# Patient Record
Sex: FEMALE | Race: OTHER | URBAN - METROPOLITAN AREA
[De-identification: names, ages, dates, MRNs, and addresses within clinical notes are randomized per-mention and may not be internally consistent; named-entity substitution may affect disease eponyms.]

---

## 2022-08-08 ENCOUNTER — HOSPITAL ENCOUNTER (OUTPATIENT)
Age: 32
Discharge: HOME | End: 2022-08-08
Attending: OBSTETRICS & GYNECOLOGY | Admitting: OBSTETRICS & GYNECOLOGY
Payer: MEDICAID

## 2022-08-08 VITALS
BODY MASS INDEX: 35.33 KG/M2 | HEIGHT: 62 IN | RESPIRATION RATE: 18 BRPM | HEART RATE: 83 BPM | TEMPERATURE: 98 F | WEIGHT: 192 LBS | SYSTOLIC BLOOD PRESSURE: 107 MMHG | DIASTOLIC BLOOD PRESSURE: 66 MMHG

## 2022-08-08 PROBLEM — E86.0 DEHYDRATION DURING PREGNANCY: Status: ACTIVE | Noted: 2022-08-08

## 2022-08-08 PROBLEM — O99.280 DEHYDRATION DURING PREGNANCY: Status: ACTIVE | Noted: 2022-08-08

## 2022-08-08 LAB
AMPHETAMINE SCREEN, URINE: NORMAL
BACTERIA: NEGATIVE /HPF
BARBITURATE SCREEN URINE: NORMAL
BENZODIAZEPINE SCREEN, URINE: NORMAL
BILIRUBIN URINE: NEGATIVE
BLOOD, URINE: NEGATIVE
CANNABINOID SCREEN URINE: NORMAL
CLARITY: CLEAR
CLUE CELLS: NORMAL
COCAINE METABOLITE SCREEN URINE: NORMAL
COLOR: YELLOW
EPITHELIAL CELLS, UA: ABNORMAL /HPF (ref 0–5)
FENTANYL SCREEN, URINE: NORMAL
GLUCOSE URINE: NEGATIVE MG/DL
HYALINE CASTS: ABNORMAL /HPF (ref 0–5)
KETONES, URINE: 15 MG/DL
LEUKOCYTE ESTERASE, URINE: ABNORMAL
Lab: NORMAL
METHADONE SCREEN, URINE: NORMAL
NITRITE, URINE: NEGATIVE
OPIATE SCREEN URINE: NORMAL
OXYCODONE URINE: NORMAL
PH UA: 7 (ref 5–9)
PHENCYCLIDINE SCREEN URINE: NORMAL
PROPOXYPHENE SCREEN: NORMAL
PROTEIN UA: NEGATIVE MG/DL
RBC UA: ABNORMAL /HPF (ref 0–5)
SPECIFIC GRAVITY UA: 1 (ref 1–1.03)
TRICHOMONAS PREP: NORMAL
TRICHOMONAS VAGINALIS SCREEN: NEGATIVE
URINE REFLEX TO CULTURE: ABNORMAL
UROBILINOGEN, URINE: 0.2 E.U./DL
WBC UA: ABNORMAL /HPF (ref 0–5)
YEAST WET PREP: NORMAL

## 2022-08-08 PROCEDURE — 81001 URINALYSIS AUTO W/SCOPE: CPT

## 2022-08-08 PROCEDURE — 99213 OFFICE O/P EST LOW 20 MIN: CPT | Performed by: OBSTETRICS & GYNECOLOGY

## 2022-08-08 PROCEDURE — 80307 DRUG TEST PRSMV CHEM ANLYZR: CPT

## 2022-08-08 PROCEDURE — 87808 TRICHOMONAS ASSAY W/OPTIC: CPT

## 2022-08-08 PROCEDURE — 59025 FETAL NON-STRESS TEST: CPT

## 2022-08-08 PROCEDURE — 99284 EMERGENCY DEPT VISIT MOD MDM: CPT

## 2022-08-08 PROCEDURE — 87210 SMEAR WET MOUNT SALINE/INK: CPT

## 2022-08-08 RX ORDER — NALBUPHINE HCL 10 MG/ML
10 AMPUL (ML) INJECTION
Status: DISCONTINUED | OUTPATIENT
Start: 2022-08-08 | End: 2022-08-08 | Stop reason: HOSPADM

## 2022-08-08 RX ORDER — ALPRAZOLAM 0.25 MG/1
0.25 TABLET ORAL DAILY PRN
COMMUNITY
Start: 2022-06-09 | End: 2022-09-07

## 2022-08-08 RX ORDER — SERTRALINE HYDROCHLORIDE 100 MG/1
150 TABLET, FILM COATED ORAL DAILY
COMMUNITY
Start: 2022-08-04

## 2022-08-08 RX ORDER — SODIUM CHLORIDE, SODIUM LACTATE, POTASSIUM CHLORIDE, AND CALCIUM CHLORIDE .6; .31; .03; .02 G/100ML; G/100ML; G/100ML; G/100ML
500 INJECTION, SOLUTION INTRAVENOUS ONCE
Status: DISCONTINUED | OUTPATIENT
Start: 2022-08-08 | End: 2022-08-08 | Stop reason: HOSPADM

## 2022-08-08 NOTE — PROGRESS NOTES
Pt states she feels better still, but is still able to feel ctx and pressure, ivf infused, will monitor and notify dr Paulie Pack.

## 2022-08-08 NOTE — PROGRESS NOTES
Pt here via squad from work, lower abdominal pain and tightening since about 1045. No bleeding, no leaking, no intercourse in past 24 hours. Failed 1 hour glucose, pt sees [facility] CNM, 1 ftvd 11 years ago.

## 2022-08-08 NOTE — PROGRESS NOTES
Dr Delarosa Postal aware of pt status, ivf infused. States pt can be discharge, to rest this afternoon, will call her tomorrow with results of wet prep.  If pt ctx worsen, pt to call her OB or return to hospital.

## 2022-08-08 NOTE — ED TRIAGE NOTES
Prep, Genital [[id number]]    Collected: 08/08/22 1253    Updated: 08/08/22 1315    Specimen Source: Vaginal    Microscopic Urinalysis [[id number]] (Abnormal)    Collected: 08/08/22 1204    Updated: 08/08/22 1235     Bacteria, UA Negative /HPF    Hyaline Casts, UA 1-3 /HPF    WBC, UA 6-9 Abnormal  /HPF    RBC, UA 0-2 /HPF    Epithelial Cells, UA 0-2 /HPF   Urine Drug Screen [[id number]]    Collected: 08/08/22 1204    Updated: 08/08/22 1233    Specimen Source: Urine, clean catch     Amphetamine Screen, Urine Neg    Barbiturate Screen, Ur Neg    Benzodiazepine Screen, Urine Neg    Cannabinoid Scrn, Ur Neg    Cocaine Metabolite Screen, Urine Neg    Opiate Scrn, Ur Neg    PCP Screen, Urine Neg    Methadone Screen, Urine Neg    Propoxyphene Scrn, Ur Neg    Oxycodone Urine Neg    FENTANYL SCREEN, URINE Neg    Drug Screen Comment: see below    Comment: This method is a screening test to detect only these drug   classes as part of a medical workup. Confirmatory testing   by another method should be ordered if clinically indicated. Urinalysis with Reflex to Culture [[id number]] (Abnormal)    Collected: 08/08/22 1204    Updated: 08/08/22 1233    Specimen Source: Urine, clean catch     Color, UA Yellow    Clarity, UA Clear    Glucose, Ur Negative mg/dL    Bilirubin Urine Negative    Ketones, Urine 15 Abnormal  mg/dL    Specific Gravity, UA 1.005    Blood, Urine Negative    pH, UA 7.0    Protein, UA Negative mg/dL    Urobilinogen, Urine 0.2 E.U./dL    Nitrite, Urine Negative    Leukocyte Esterase, Urine MODERATE Abnormal     Urine Reflex to Culture Not Indicated       ASSESSMENT & PLAN:      Assessment:   IUP @ 31.4 weeks  Dehydration affecting pregnancy  3. PTC affecting pregnancy    Plan:    NST reactive-reassurance given. IV fluid bolus given   IM Nubain given for PTC. Contractions slowed down with Nubain and IVF. Wet prep pending.  Will discharge home with pelvic rest, fetal kick counts and follow up with private OBGYN on 8/17/2022. Patient will be made aware of wet prep results once they are in .

## 2022-08-08 NOTE — DISCHARGE INSTRUCTIONS
Rest today, drink plenty of fluids, call OB provider for f/u appt, return to hospital for worsening pain.

## 2022-08-19 ENCOUNTER — HOSPITAL ENCOUNTER (OUTPATIENT)
Age: 32
Discharge: HOME | End: 2022-08-19
Attending: OBSTETRICS & GYNECOLOGY | Admitting: OBSTETRICS & GYNECOLOGY
Payer: MEDICAID

## 2022-08-19 PROCEDURE — 59025 FETAL NON-STRESS TEST: CPT | Performed by: OBSTETRICS & GYNECOLOGY

## 2022-08-19 PROCEDURE — 59025 FETAL NON-STRESS TEST: CPT

## 2022-08-19 NOTE — PROCEDURES
Department of Obstetrics and Gynecology  Labor and Delivery  Monik Schaffer MD: [facility] Triage Note      SUBJECTIVE:  Patient is a 35 yo  Female @ 33.1 weeks, [facility] 10/6/2022 who presents with concerns of DFM. She has high risk pregnancy, being seen at [facility]. She reports recent discharge from [facility] 2022 for PTC. She is s/p BMZ x 2. She has history of gastric bypass as well as A1GDM. She reports going home around 6 pm last night and that all she has done is sleep. She reports drinking ~ 3 bottles of water since discharge. She called office and [place] who recommended patient be seen for evaluation. She denies any recent coitus. She reports occasional contraction. OBJECTIVE    ROS:  Gen: Negative  CV:Negative  Lungs: Negative  Abdomen: Gravid, DFM  Pelvis: Negative  Rest of systems reviewed and found to be negative. Vitals:  LMP 2021 Recent Vitals     Most Recent Value 2022   1203 2022   1202 2022   1159   BP: 107/66  as of 2022 107/66 95/66 --   Temp: 98 °F (36.7 °C)  as of 2022 -- -- 98 °F (36.7 °C)   Heart Rate: 83  as of 2022 83 80 --   Height: 5' 2\" (157.5 cm)  as of 2022 -- -- 5' 2\" (157.5 cm)   Weight: 192 lb (87.1 kg)  as of 2022 -- -- 192 lb (87.1 kg)   Body Mass Index: 35.12 kg/m² Abnormal    5' 2\" (157.5 cm)  as of 2022   192 lb (87.1 kg)  as of 2022      Resp: 18  as of 2022 -- -- 18   SpO2: Not taken -- --        PE:   Gen: AxO x 3, Anxious  CV: RRR  Lungs: CTAB  Abdomen: Soft, gravid, +FM      Fetal heart rate:  Category I       Baseline Heart Rate: 140s R       Accelerations:  present       Decelerations:  none       Variability:  moderate    Contraction frequency: Occ         DATA:      ASSESSMENT & PLAN:      Assessment:    IUP @ 33.1 weeks  DFM-resolved-patient reports feeling movement    Plan:    NST reactive   Reassurance given.  Will discharge home with instructions, fetal kick counts and to follow up with private OBGYN with next scheduled appointment. PTL precautions given.

## 2022-08-19 NOTE — PROGRESS NOTES
Pt arrived to triage with complaints of decreased fetal movement. Pt  to room 313. Pt states she was d/c from [facility] yesterday and had not felt the baby move since then. Had betamethasone x2. Last dose 8/18/2022. Pt diet controlled gestational diabetic.

## 2022-09-12 ENCOUNTER — HOSPITAL ENCOUNTER (OUTPATIENT)
Age: 32
Discharge: HOME | DRG: 560 | End: 2022-09-12
Attending: OBSTETRICS & GYNECOLOGY | Admitting: OBSTETRICS & GYNECOLOGY
Payer: MEDICAID

## 2022-09-12 VITALS
SYSTOLIC BLOOD PRESSURE: 96 MMHG | HEART RATE: 85 BPM | TEMPERATURE: 97.7 F | DIASTOLIC BLOOD PRESSURE: 58 MMHG | RESPIRATION RATE: 18 BRPM

## 2022-09-12 PROBLEM — N60.19 FIBROCYSTIC BREAST CHANGES: Status: ACTIVE | Noted: 2022-05-13

## 2022-09-12 PROBLEM — L30.9 ECZEMA: Status: ACTIVE | Noted: 2022-09-12

## 2022-09-12 PROBLEM — N84.1 CERVICAL POLYP: Status: ACTIVE | Noted: 2022-03-25

## 2022-09-12 PROBLEM — M54.9 BACKACHE: Status: ACTIVE | Noted: 2019-07-15

## 2022-09-12 PROBLEM — E66.9 OBESITY: Status: ACTIVE | Noted: 2022-09-12

## 2022-09-12 PROBLEM — Z37.9 NORMAL LABOR: Status: ACTIVE | Noted: 2022-09-12

## 2022-09-12 PROBLEM — K21.9 GASTROESOPHAGEAL REFLUX DISEASE: Status: ACTIVE | Noted: 2022-05-13

## 2022-09-12 PROBLEM — L29.9 ITCHING OF BOTH HANDS: Status: ACTIVE | Noted: 2022-06-20

## 2022-09-12 PROBLEM — O99.810 ABNORMAL GLUCOSE COMPLICATING PREGNANCY: Status: ACTIVE | Noted: 2022-08-31

## 2022-09-12 PROBLEM — O36.63X0 EXCESSIVE FETAL GROWTH AFFECTING MANAGEMENT OF PREGNANCY IN THIRD TRIMESTER: Status: ACTIVE | Noted: 2022-09-08

## 2022-09-12 PROBLEM — R00.2 PALPITATIONS: Status: ACTIVE | Noted: 2019-07-15

## 2022-09-12 PROBLEM — O47.03 THREATENED PRETERM LABOR, THIRD TRIMESTER: Status: ACTIVE | Noted: 2022-08-18

## 2022-09-12 PROBLEM — E53.8 COBALAMIN DEFICIENCY: Status: ACTIVE | Noted: 2022-05-13

## 2022-09-12 PROBLEM — O09.899 RUBELLA NON-IMMUNE STATUS, ANTEPARTUM: Status: ACTIVE | Noted: 2022-03-16

## 2022-09-12 PROBLEM — D17.1 LIPOMA OF TORSO: Status: ACTIVE | Noted: 2022-05-13

## 2022-09-12 PROBLEM — O20.9 VAGINAL BLEEDING BEFORE 22 WEEKS GESTATION: Status: ACTIVE | Noted: 2022-03-25

## 2022-09-12 PROBLEM — M17.10 ARTHRITIS OF KNEE: Status: ACTIVE | Noted: 2022-05-13

## 2022-09-12 PROBLEM — F41.9 ANXIETY: Status: ACTIVE | Noted: 2022-05-13

## 2022-09-12 PROBLEM — O47.03 PRETERM UTERINE CONTRACTIONS IN THIRD TRIMESTER, ANTEPARTUM: Status: ACTIVE | Noted: 2022-09-12

## 2022-09-12 PROBLEM — R06.09 DOE (DYSPNEA ON EXERTION): Status: ACTIVE | Noted: 2019-07-15

## 2022-09-12 PROBLEM — Z28.39 RUBELLA NON-IMMUNE STATUS, ANTEPARTUM: Status: ACTIVE | Noted: 2022-03-16

## 2022-09-12 PROBLEM — F41.8 ANXIETY WITH DEPRESSION: Status: ACTIVE | Noted: 2019-01-03

## 2022-09-12 PROBLEM — E55.9 VITAMIN D DEFICIENCY: Status: ACTIVE | Noted: 2022-05-13

## 2022-09-12 PROBLEM — J30.9 ALLERGIC RHINITIS: Status: ACTIVE | Noted: 2022-05-13

## 2022-09-12 PROBLEM — M41.9 SCOLIOSIS: Status: ACTIVE | Noted: 2022-05-13

## 2022-09-12 PROBLEM — K52.9 GASTROENTERITIS: Status: ACTIVE | Noted: 2022-05-13

## 2022-09-12 LAB
AMPHETAMINE SCREEN, URINE: NORMAL
BACTERIA: NEGATIVE /HPF
BARBITURATE SCREEN URINE: NORMAL
BENZODIAZEPINE SCREEN, URINE: NORMAL
BILIRUBIN URINE: NEGATIVE
BLOOD, URINE: NEGATIVE
CANNABINOID SCREEN URINE: NORMAL
CLARITY: CLEAR
COCAINE METABOLITE SCREEN URINE: NORMAL
COLOR: YELLOW
EPITHELIAL CELLS, UA: NORMAL /HPF (ref 0–5)
FENTANYL SCREEN, URINE: NORMAL
GLUCOSE URINE: NEGATIVE MG/DL
HYALINE CASTS: NORMAL /HPF (ref 0–5)
KETONES, URINE: ABNORMAL MG/DL
LEUKOCYTE ESTERASE, URINE: ABNORMAL
Lab: NORMAL
METHADONE SCREEN, URINE: NORMAL
NITRITE, URINE: NEGATIVE
OPIATE SCREEN URINE: NORMAL
OXYCODONE URINE: NORMAL
PH UA: 6 (ref 5–9)
PHENCYCLIDINE SCREEN URINE: NORMAL
PLACENTA ALPHA MICROGLOBULIN-1: NEGATIVE
PROPOXYPHENE SCREEN: NORMAL
PROTEIN UA: 30 MG/DL
RBC UA: NORMAL /HPF (ref 0–5)
SPECIFIC GRAVITY UA: 1.02 (ref 1–1.03)
URINE REFLEX TO CULTURE: ABNORMAL
UROBILINOGEN, URINE: 1 E.U./DL
WBC UA: NORMAL /HPF (ref 0–5)

## 2022-09-12 PROCEDURE — 1220000000 HC SEMI PRIVATE OB R&B

## 2022-09-12 PROCEDURE — 99999 PR OFFICE/OUTPT VISIT,PROCEDURE ONLY: CPT | Performed by: OBSTETRICS & GYNECOLOGY

## 2022-09-12 PROCEDURE — 80307 DRUG TEST PRSMV CHEM ANLYZR: CPT

## 2022-09-12 PROCEDURE — 99215 OFFICE O/P EST HI 40 MIN: CPT | Performed by: OBSTETRICS & GYNECOLOGY

## 2022-09-12 PROCEDURE — 99283 EMERGENCY DEPT VISIT LOW MDM: CPT

## 2022-09-12 PROCEDURE — 81001 URINALYSIS AUTO W/SCOPE: CPT

## 2022-09-12 PROCEDURE — 6360000002 HC RX W HCPCS

## 2022-09-12 PROCEDURE — 84112 EVAL AMNIOTIC FLUID PROTEIN: CPT

## 2022-09-12 RX ORDER — ACETAMINOPHEN 325 MG/1
650 TABLET ORAL EVERY 4 HOURS PRN
Status: DISCONTINUED | OUTPATIENT
Start: 2022-09-12 | End: 2022-09-12

## 2022-09-12 RX ORDER — SODIUM CHLORIDE 0.9 % (FLUSH) 0.9 %
5-40 SYRINGE (ML) INJECTION EVERY 12 HOURS SCHEDULED
Status: DISCONTINUED | OUTPATIENT
Start: 2022-09-12 | End: 2022-09-12

## 2022-09-12 RX ORDER — SODIUM CHLORIDE, SODIUM LACTATE, POTASSIUM CHLORIDE, CALCIUM CHLORIDE 600; 310; 30; 20 MG/100ML; MG/100ML; MG/100ML; MG/100ML
INJECTION, SOLUTION INTRAVENOUS CONTINUOUS
Status: DISCONTINUED | OUTPATIENT
Start: 2022-09-12 | End: 2022-09-12

## 2022-09-12 RX ORDER — GLUCOSAMINE HCL/CHONDROITIN SU 500-400 MG
CAPSULE ORAL
Status: ON HOLD | COMMUNITY
Start: 2022-07-26 | End: 2022-09-16 | Stop reason: HOSPADM

## 2022-09-12 RX ORDER — ACETAMINOPHEN 500 MG
500 TABLET ORAL EVERY 6 HOURS PRN
Status: ON HOLD | COMMUNITY
End: 2022-09-16 | Stop reason: HOSPADM

## 2022-09-12 RX ORDER — NALBUPHINE HCL 10 MG/ML
10 AMPUL (ML) INJECTION
Status: DISCONTINUED | OUTPATIENT
Start: 2022-09-12 | End: 2022-09-12

## 2022-09-12 RX ORDER — SODIUM CHLORIDE 0.9 % (FLUSH) 0.9 %
5-40 SYRINGE (ML) INJECTION PRN
Status: DISCONTINUED | OUTPATIENT
Start: 2022-09-12 | End: 2022-09-12

## 2022-09-12 RX ORDER — SODIUM CHLORIDE, SODIUM LACTATE, POTASSIUM CHLORIDE, AND CALCIUM CHLORIDE .6; .31; .03; .02 G/100ML; G/100ML; G/100ML; G/100ML
1000 INJECTION, SOLUTION INTRAVENOUS PRN
Status: DISCONTINUED | OUTPATIENT
Start: 2022-09-12 | End: 2022-09-12

## 2022-09-12 RX ORDER — NALBUPHINE HCL 10 MG/ML
AMPUL (ML) INJECTION
Status: COMPLETED
Start: 2022-09-12 | End: 2022-09-12

## 2022-09-12 RX ORDER — SODIUM CHLORIDE 9 MG/ML
25 INJECTION, SOLUTION INTRAVENOUS PRN
Status: DISCONTINUED | OUTPATIENT
Start: 2022-09-12 | End: 2022-09-12

## 2022-09-12 RX ORDER — ONDANSETRON 2 MG/ML
4 INJECTION INTRAMUSCULAR; INTRAVENOUS EVERY 6 HOURS PRN
Status: DISCONTINUED | OUTPATIENT
Start: 2022-09-12 | End: 2022-09-12

## 2022-09-12 RX ORDER — BLOOD-GLUCOSE METER
1 KIT MISCELLANEOUS 4 TIMES DAILY
COMMUNITY
Start: 2022-08-24

## 2022-09-12 RX ORDER — SODIUM CHLORIDE, SODIUM LACTATE, POTASSIUM CHLORIDE, AND CALCIUM CHLORIDE .6; .31; .03; .02 G/100ML; G/100ML; G/100ML; G/100ML
500 INJECTION, SOLUTION INTRAVENOUS PRN
Status: DISCONTINUED | OUTPATIENT
Start: 2022-09-12 | End: 2022-09-12

## 2022-09-12 RX ADMIN — NALBUPHINE HYDROCHLORIDE 10 MG: 10 INJECTION, SOLUTION INTRAMUSCULAR; INTRAVENOUS; SUBCUTANEOUS at 08:04

## 2022-09-12 RX ADMIN — Medication 10 MG: at 08:04

## 2022-09-12 ASSESSMENT — PAIN DESCRIPTION - LOCATION: LOCATION: ABDOMEN

## 2022-09-12 NOTE — FLOWSHEET NOTE
Called Dr. Cedillo Person to inform him of patients arrival, complaints of contractions, and contraction pattern. Made aware that patient is unable to urinate at this time and states to perform SVE.

## 2022-09-12 NOTE — PROGRESS NOTES
Obstetrical History and Physical        CHIEF COMPLAINT:  abdominal pain, contractions    HISTORY OF PRESENT ILLNESS:      The patient is a 34 y.o. female at 37w2d   OB History          2    Para   1    Term                AB        Living             SAB        IAB        Ectopic        Molar        Multiple        Live Births                Patient presents with a chief complaint as above and is being admitted for active phase labor  Denies DFM/VB/LOF/HA/EpigastricPain/Visual changes     Estimated Due Date: Estimated Date of Delivery: 10/6/22        PRENATAL CARE:  Pregnancy Complications:   None      PAST OB HISTORY:  OB History    Para Term  AB Living   2 1           SAB IAB Ectopic Molar Multiple Live Births                    # Outcome Date GA Lbr Sylvester/2nd Weight Sex Delivery Anes PTL Lv   2 Current            1 Para                   Past Medical History:    History reviewed. No pertinent past medical history.   Past Surgical History:        Procedure Laterality Date    ANKLE SURGERY  2018    GASTRIC BYPASS SURGERY  10/2018     Allergies:    Sulfa antibiotics  Social History:    Social History     Socioeconomic History    Marital status: Unknown     Spouse name: Not on file    Number of children: Not on file    Years of education: Not on file    Highest education level: Not on file   Occupational History    Not on file   Tobacco Use    Smoking status: Not on file    Smokeless tobacco: Not on file   Substance and Sexual Activity    Alcohol use: Not on file    Drug use: Not on file    Sexual activity: Not on file   Other Topics Concern    Not on file   Social History Narrative    Not on file     Social Determinants of Health     Financial Resource Strain: Not on file   Food Insecurity: Not on file   Transportation Needs: Not on file   Physical Activity: Not on file   Stress: Not on file   Social Connections: Not on file   Intimate Partner Violence: Not on file   Housing Stability: Not on file     Family History:   History reviewed. No pertinent family history. Medications Prior to Admission:  Medications Prior to Admission: Prenatal Vit-Fe Fumarate-FA (PRENATAL 1+1 PO), Take by mouth  acetaminophen (TYLENOL) 500 MG tablet, Take 500 mg by mouth every 6 hours as needed for Pain  sertraline (ZOLOFT) 100 MG tablet, Take 150 mg by mouth in the morning. REVIEW OF SYSTEMS:   Const:            Negative  HEENT:  Negative  Resp:   Negative  CVS:   Negative  GI:  Negative  :  Negative  MSK:    Negative  Breast: Negative  Skin:  Negative  Heme/Lymph:Negative  Endo:  Negative  Neuro:  Negative  Psych: Negative    PHYSICAL EXAM:  /78   Pulse 82   Temp 97.7 °F (36.5 °C) (Oral)   Resp 18   LMP 2021     General appearance:  awake, alert, cooperative, no apparent distress, and appears stated age  Neurologic:  Awake, alert, oriented to name, place and time. Lungs:  No increased work of breathing, good air exchange  Abdomen:  Soft, non tender, gravid, consistent with her gestational age  Sterile Speculum Exam:    Membranes:  Intact   HSV Lesions: Absent   Cervix: 3/60/-2  Contraction frequency: 1-2 minutes      Labs:   Blood Type/Rh:  No results found for: LABABO, RH  Group B Strep:  No results found for: GRPBPCR      Fetus:   EFW: 7 lbs by exam   Presentation: vertex by exam    Carlisle Score: 9   0 1 2 3   Position Posterior Mid Anterior -   Consistency Firm Medium Soft -   Effacement 0-30% 40-50% 60-70% 80% or >   Dilation 0cm 1-2cm 3-4cm 5cm or >   Fetal Station -3 -2 -1, 0 +1, +2     LABOR DELIVERY ? ??     SCD's ONLY (labor through postpartum ambulation) SCD's PLUS   Prophylactic Anticoagulation   until discharge SCD's PLUS   Prophylactic Anticoagulation   for 6 weeks SCD's PLUS  Therapeutic Anticoagulation for 6 weeks   Vaginal Delivery   [] BMI ? 40 kg/m2           Delivery   All patients Vaginal Delivery   [] BMI ? 40 kg/m2       AND  [] Antepartum hospitalization ?  72 hours within the past month     Delivery   1 Major Risk Factor:  [] BMI ? 35 kg/m2   [] Low Risk Thrombophilia  [] PPH+RBCs, IR, or operation  [] Infection+Antibiotics  [] Antepartum hospitalization ? 72 hours within the past month   [] PMH: Sickle Cell, SLE, Cardiac Dz, Active IBD, Active Cancer, Nephrotic Syndrome    OR 2 Minor Risk Factors:  [] Multiple gestation  [] Age > 40  [] PPH ? 1,000cc  [] (+)FMH of VTE  [] Smoker  [] Preeclampsia [] BMI ? 40 kg/m2       AND  [] Low Risk Thrombophilia    OR     ANY OF THE FOLLOWING:  [] High Risk Thrombophilia without prior VTE  [] Low Risk Thrombophilia with (+)FMH of VTE  [] Any single prior VTE ANY OF THE FOLLOWING:  [] Already on LMWH/UFH  [] Multiple prior VTE  [] High Risk Thrombophilia with prior VTE     Low Risk Thrombophilia: FVL (heterozygous), Prothrombin (heterozygous), Protein C, Protein S  High Risk Thrombophilia: FVL (homozygous), Prothrombin (homozygous), FVL+Prothrombin (heterozygous), Antithrombin III, APLS       ASSESSMENT AND PLAN:    1. IUP at 36w4d  2. Active phase labor    Admission: Admit to L&D   FHR:  Category 1  Celestone: indicated and ordered  Pain control plan: desires epidural  Delivery Plan: expectant   GBS: GBS Unknown, Ampicillin for GBS prophylaxis  LARC: Denies  Intrapartum SCDs: Not Indicated  Postpartum VTE Prophylaxis: Not Indicated          Ignacio Peters MBA, Beaulah Hey  2022

## 2022-09-12 NOTE — ED TRIAGE NOTES
Department of Obstetrics and Gynecology  Labor and Delivery  Michael Alcaraz MD: [facility] Triage Note      SUBJECTIVE:  Patient is a 35 yo  female @ 36.4 weeks, [facility] 10/6/2022 who presents with complaints of contractions. She is a patient of Midwives @ [facility]. She has been seen at multiple hospitals for ongoing  contractions. She was just discharged from [facility] 9/10/2022 for same complaint. She presents today with worsening contractions over the last 18 hours. She was also concerned that  her water was broken. She reports calling her private OBGYN but received no calls back. OBJECTIVE    ROS:  Gen: Uncomfortable  CV: Negative  Lungs: Negative  Abdomen: Gravid, +FM  Pelvis: Pressure  Rest of systems reviewed and found to be negative.     Vitals:  BP (!) 96/58   Pulse 85   Temp 97.7 °F (36.5 °C) (Oral)   Resp 18   LMP 2021     PE:   Gen: AxO x 3, in Mild distress  Abdomen: Gravid, +FM  Cervix:             Dilation:  2-3         Effacement:  60         Station:  -3         Consistency:  Medium         Position: Posterior    Fetal Position:  Vertex    Membranes: Intact    Fetal heart rate:  Category I       Baseline Heart Rate: 140s R       Accelerations:  present       Decelerations: none       Variability:  moderate    Contraction frequency: Irregular, q 4-7 minutes, spacing out    DATA:  Time Vallie Sarbjit Orders]     Results     Component Value Units   Rupture of Fetal Membrane [[id number]]    Collected: 22    Updated: 22    Specimen Type: Vaginal     Placenta Alpha Microglobulin-1 Negative   Microscopic Urinalysis [[id number]]    Collected: 22    Updated: 22     Bacteria, UA Negative /HPF    Hyaline Casts, UA 1-3 /HPF    WBC, UA 3-5 /HPF    RBC, UA 0-2 /HPF    Epithelial Cells, UA 3-5 /HPF   Urinalysis with Reflex to Culture [[id number]] (Abnormal)    Collected: 22    Updated: 22    Specimen Source: Urine Color, UA Yellow    Clarity, UA Clear    Glucose, Ur Negative mg/dL    Bilirubin Urine Negative    Ketones, Urine TRACE Abnormal  mg/dL    Specific Gravity, UA 1.023    Blood, Urine Negative    pH, UA 6.0    Protein, UA 30 Abnormal  mg/dL    Urobilinogen, Urine 1.0 E.U./dL    Nitrite, Urine Negative    Leukocyte Esterase, Urine SMALL Abnormal     Urine Reflex to Culture Not Indicated   Urine Drug Screen [[id number]]    Collected: 22    Updated: 22    Specimen Source: Urine     Amphetamine Screen, Urine Neg    Barbiturate Screen, Ur Neg    Benzodiazepine Screen, Urine Neg    Cannabinoid Scrn, Ur Neg    Cocaine Metabolite Screen, Urine Neg    Opiate Scrn, Ur Neg    PCP Screen, Urine Neg    Methadone Screen, Urine Neg    Propoxyphene Scrn, Ur Neg    Oxycodone Urine Neg    FENTANYL SCREEN, URINE Neg    Drug Screen Comment: see below    Comment: This method is a screening test to detect only these drug   classes as part of a medical workup. Confirmatory testing   by another method should be ordered if clinically indicated. ASSESSMENT & PLAN:      Assessment:   IUP @ 36.4 weeks  Rupture of Membranes Ruled out   Contractions    Plan:    Reactive NST. Reassurance given. No cervical change from 2 hour observation. Patient reports contractions spacing out and she got relief from Nubain. Recommend increasing po liquid intake. Discharge home with PTL precautions, fetal kick counts and follow up with private provider this week. Spoke with triage nurse for midwife group and discussed patient's status, and need for follow up sooner rather than later.

## 2022-09-12 NOTE — FLOWSHEET NOTE
Patient states having contractions for about 18 hours. States shes been ab since 32 weeks, but these contractions are more intense. [facility] States being seen at clinic this past Saturday for low sugar and minimal decreased movement. Patient states she has gestational diabetes.

## 2022-09-12 NOTE — FLOWSHEET NOTE
Patient in room 314. Attempted to provide urine sample. States she does not have to urinate at this time.

## 2022-09-12 NOTE — FLOWSHEET NOTE
Dr Kareem Palmer at bedside. SVE performed. Cervix remains unchanged. Encouraged patient to make appointment with office today or tomorrow. Patient may be discharged home now.

## 2022-09-12 NOTE — FLOWSHEET NOTE
Dr Marlee Peña at bedside. SVE done, patient 2-3 cm dilated. Discussed with patient not being able to induce before 39 weeks. Dr Marlee Peña to offer nubain and recheck in about an hour.

## 2022-09-13 ENCOUNTER — ROUTINE PRENATAL (OUTPATIENT)
Dept: OBGYN CLINIC | Age: 32
End: 2022-09-13
Payer: MEDICAID

## 2022-09-13 VITALS
SYSTOLIC BLOOD PRESSURE: 104 MMHG | WEIGHT: 200 LBS | DIASTOLIC BLOOD PRESSURE: 64 MMHG | BODY MASS INDEX: 36.58 KG/M2 | HEART RATE: 96 BPM

## 2022-09-13 DIAGNOSIS — Z34.93 PRENATAL CARE, THIRD TRIMESTER: Primary | ICD-10-CM

## 2022-09-13 DIAGNOSIS — O24.410 DIET CONTROLLED GESTATIONAL DIABETES MELLITUS (GDM) IN THIRD TRIMESTER: ICD-10-CM

## 2022-09-13 DIAGNOSIS — Z34.93 PRENATAL CARE, THIRD TRIMESTER: ICD-10-CM

## 2022-09-13 DIAGNOSIS — Z34.83 SUPERVISION OF NORMAL INTRAUTERINE PREGNANCY IN MULTIGRAVIDA IN THIRD TRIMESTER: ICD-10-CM

## 2022-09-13 DIAGNOSIS — O36.63X9 LGA (LARGE FOR GESTATIONAL AGE) FETUS AFFECTING MANAGEMENT OF MOTHER, THIRD TRIMESTER, OTHER FETUS: ICD-10-CM

## 2022-09-13 PROCEDURE — 99213 OFFICE O/P EST LOW 20 MIN: CPT | Performed by: OBSTETRICS & GYNECOLOGY

## 2022-09-13 RX ORDER — FLASH GLUCOSE SENSOR
KIT MISCELLANEOUS
COMMUNITY
Start: 2022-07-14

## 2022-09-13 RX ORDER — HYDROXYZINE HYDROCHLORIDE 10 MG/1
TABLET, FILM COATED ORAL
Status: ON HOLD | COMMUNITY
Start: 2022-06-08 | End: 2022-09-16 | Stop reason: HOSPADM

## 2022-09-13 NOTE — PROGRESS NOTES
Transfer from [facility]. irregular contractions. Pressure and vaginal pain. GDM. Monitoring blood sugars. Saw endocrine yesterday.  US 35weeks done

## 2022-09-13 NOTE — PROGRESS NOTES
Patient's last menstrual period was 12/30/2021. Please reference prenatal and OB flow chart for further information  PT here today for routine prenatal care  Pt endorses fetal movement and denies loss of fluid, contractions or vaginal bleeding  Patient is here at 36 weeks and 5 days. She was receiving care elsewhere and transferred here. She was up in labor and delivery yesterday thought she was in labor. Her NST was reactive. She was 3 cm dilated yesterday. She is a gestational diabetic diet controlled sugars are excellent. Positive fetal movement. Kick counts twice daily are good. She is having infrequent contractions  ROS:  Pt denies headache, vision changes, right upper quadrant pain, dysuria, or nausea/vomiting,     PE:  /64   Pulse 96   Wt 200 lb (90.7 kg)   LMP 12/30/2021   BMI 36.58 kg/m²   Gen - Alert and oriented x 3  HEENT- NC/AT, CVS - RRR, Lungs - CTAB  Abd - FH 40  [id number]  Cervix 3 cm 50% effaced vertex at a -1 station      ASSESSMENT AND PLAN:   IUP at 36 weeks and 5 days  LGA fetus  Gestational diabetes diet-controlled plan patient to follow-up in 1 week we will do NSTs on a weekly basis.   Ultrasound in 2 weeks

## 2022-09-14 ENCOUNTER — ANESTHESIA (OUTPATIENT)
Dept: LABOR AND DELIVERY | Age: 32
DRG: 560 | End: 2022-09-14
Payer: MEDICAID

## 2022-09-14 ENCOUNTER — ANESTHESIA EVENT (OUTPATIENT)
Dept: LABOR AND DELIVERY | Age: 32
DRG: 560 | End: 2022-09-14
Payer: MEDICAID

## 2022-09-14 ENCOUNTER — HOSPITAL ENCOUNTER (INPATIENT)
Age: 32
LOS: 2 days | Discharge: HOME | DRG: 560 | End: 2022-09-16
Attending: OBSTETRICS & GYNECOLOGY | Admitting: OBSTETRICS & GYNECOLOGY
Payer: MEDICAID

## 2022-09-14 LAB
ABO/RH: NORMAL
ALBUMIN SERPL-MCNC: 3 G/DL (ref 3.5–4.6)
ALP BLD-CCNC: 204 U/L (ref 40–130)
ALT SERPL-CCNC: 7 U/L (ref 0–33)
AMPHETAMINE SCREEN, URINE: NORMAL
ANION GAP SERPL CALCULATED.3IONS-SCNC: 11 MEQ/L (ref 9–15)
ANTIBODY SCREEN: NORMAL
AST SERPL-CCNC: 24 U/L (ref 0–35)
BACTERIA: NEGATIVE /HPF
BARBITURATE SCREEN URINE: NORMAL
BASOPHILS ABSOLUTE: 0 K/UL (ref 0–0.2)
BASOPHILS RELATIVE PERCENT: 0.4 %
BENZODIAZEPINE SCREEN, URINE: NORMAL
BILIRUB SERPL-MCNC: <0.2 MG/DL (ref 0.2–0.7)
BILIRUBIN URINE: NEGATIVE
BLOOD, URINE: ABNORMAL
BUN BLDV-MCNC: 6 MG/DL (ref 6–20)
CALCIUM SERPL-MCNC: 8.7 MG/DL (ref 8.5–9.9)
CANNABINOID SCREEN URINE: NORMAL
CHLORIDE BLD-SCNC: 108 MEQ/L (ref 95–107)
CLARITY: ABNORMAL
CO2: 18 MEQ/L (ref 20–31)
COCAINE METABOLITE SCREEN URINE: NORMAL
COLOR: YELLOW
CREAT SERPL-MCNC: 0.48 MG/DL (ref 0.5–0.9)
CRYSTALS, UA: ABNORMAL /HPF
EOSINOPHILS ABSOLUTE: 0.1 K/UL (ref 0–0.7)
EOSINOPHILS RELATIVE PERCENT: 0.9 %
EPITHELIAL CELLS, UA: ABNORMAL /HPF (ref 0–5)
FENTANYL SCREEN, URINE: NORMAL
FINE CASTS, UA: ABNORMAL /LPF (ref 0–5)
GFR AFRICAN AMERICAN: >60
GFR NON-AFRICAN AMERICAN: >60
GLOBULIN: 2.7 G/DL (ref 2.3–3.5)
GLUCOSE BLD-MCNC: 98 MG/DL (ref 70–99)
GLUCOSE URINE: NEGATIVE MG/DL
HCT VFR BLD CALC: 34.2 % (ref 37–47)
HEMOGLOBIN: 11 G/DL (ref 12–16)
HEPATITIS B SURFACE ANTIGEN INTERPRETATION: NORMAL
KETONES, URINE: 15 MG/DL
LEUKOCYTE ESTERASE, URINE: ABNORMAL
LYMPHOCYTES ABSOLUTE: 1.9 K/UL (ref 1–4.8)
LYMPHOCYTES RELATIVE PERCENT: 19.5 %
Lab: NORMAL
MCH RBC QN AUTO: 26.2 PG (ref 27–31.3)
MCHC RBC AUTO-ENTMCNC: 32.2 % (ref 33–37)
MCV RBC AUTO: 81.4 FL (ref 82–100)
METHADONE SCREEN, URINE: NORMAL
MONOCYTES ABSOLUTE: 0.8 K/UL (ref 0.2–0.8)
MONOCYTES RELATIVE PERCENT: 7.9 %
NEUTROPHILS ABSOLUTE: 7.1 K/UL (ref 1.4–6.5)
NEUTROPHILS RELATIVE PERCENT: 71.3 %
NITRITE, URINE: NEGATIVE
OPIATE SCREEN URINE: NORMAL
OXYCODONE URINE: NORMAL
PDW BLD-RTO: 13.3 % (ref 11.5–14.5)
PH UA: 6.5 (ref 5–9)
PHENCYCLIDINE SCREEN URINE: NORMAL
PLACENTA ALPHA MICROGLOBULIN-1: POSITIVE
PLATELET # BLD: 142 K/UL (ref 130–400)
POTASSIUM SERPL-SCNC: 3.5 MEQ/L (ref 3.4–4.9)
PROPOXYPHENE SCREEN: NORMAL
PROTEIN UA: 30 MG/DL
RBC # BLD: 4.2 M/UL (ref 4.2–5.4)
RBC UA: ABNORMAL /HPF (ref 0–5)
RPR: NORMAL
SARS-COV-2, NAAT: NOT DETECTED
SODIUM BLD-SCNC: 137 MEQ/L (ref 135–144)
SPECIFIC GRAVITY UA: 1.02 (ref 1–1.03)
TOTAL PROTEIN: 5.7 G/DL (ref 6.3–8)
URINE REFLEX TO CULTURE: YES
UROBILINOGEN, URINE: 1 E.U./DL
WBC # BLD: 9.9 K/UL (ref 4.8–10.8)
WBC UA: ABNORMAL /HPF (ref 0–5)

## 2022-09-14 PROCEDURE — 59409 OBSTETRICAL CARE: CPT | Performed by: OBSTETRICS & GYNECOLOGY

## 2022-09-14 PROCEDURE — 7200000001 HC VAGINAL DELIVERY

## 2022-09-14 PROCEDURE — 3700000025 EPIDURAL BLOCK: Performed by: NURSE ANESTHETIST, CERTIFIED REGISTERED

## 2022-09-14 PROCEDURE — 2580000003 HC RX 258: Performed by: OBSTETRICS & GYNECOLOGY

## 2022-09-14 PROCEDURE — 86901 BLOOD TYPING SEROLOGIC RH(D): CPT

## 2022-09-14 PROCEDURE — 0KQM0ZZ REPAIR PERINEUM MUSCLE, OPEN APPROACH: ICD-10-PCS | Performed by: OBSTETRICS & GYNECOLOGY

## 2022-09-14 PROCEDURE — 59025 FETAL NON-STRESS TEST: CPT | Performed by: OBSTETRICS & GYNECOLOGY

## 2022-09-14 PROCEDURE — 84112 EVAL AMNIOTIC FLUID PROTEIN: CPT

## 2022-09-14 PROCEDURE — 1220000000 HC SEMI PRIVATE OB R&B

## 2022-09-14 PROCEDURE — 87635 SARS-COV-2 COVID-19 AMP PRB: CPT

## 2022-09-14 PROCEDURE — 6360000002 HC RX W HCPCS: Performed by: OBSTETRICS & GYNECOLOGY

## 2022-09-14 PROCEDURE — 88307 TISSUE EXAM BY PATHOLOGIST: CPT

## 2022-09-14 PROCEDURE — 81001 URINALYSIS AUTO W/SCOPE: CPT

## 2022-09-14 PROCEDURE — 80307 DRUG TEST PRSMV CHEM ANLYZR: CPT

## 2022-09-14 PROCEDURE — 6370000000 HC RX 637 (ALT 250 FOR IP): Performed by: OBSTETRICS & GYNECOLOGY

## 2022-09-14 PROCEDURE — 86592 SYPHILIS TEST NON-TREP QUAL: CPT

## 2022-09-14 PROCEDURE — 85025 COMPLETE CBC W/AUTO DIFF WBC: CPT

## 2022-09-14 PROCEDURE — 87086 URINE CULTURE/COLONY COUNT: CPT

## 2022-09-14 PROCEDURE — 86850 RBC ANTIBODY SCREEN: CPT

## 2022-09-14 PROCEDURE — 99223 1ST HOSP IP/OBS HIGH 75: CPT | Performed by: OBSTETRICS & GYNECOLOGY

## 2022-09-14 PROCEDURE — 86900 BLOOD TYPING SEROLOGIC ABO: CPT

## 2022-09-14 PROCEDURE — 80053 COMPREHEN METABOLIC PANEL: CPT

## 2022-09-14 PROCEDURE — 2500000003 HC RX 250 WO HCPCS: Performed by: NURSE ANESTHETIST, CERTIFIED REGISTERED

## 2022-09-14 PROCEDURE — 87340 HEPATITIS B SURFACE AG IA: CPT

## 2022-09-14 RX ORDER — DOCUSATE SODIUM 100 MG/1
100 CAPSULE, LIQUID FILLED ORAL 2 TIMES DAILY PRN
Status: DISCONTINUED | OUTPATIENT
Start: 2022-09-14 | End: 2022-09-14

## 2022-09-14 RX ORDER — CARBOPROST TROMETHAMINE 250 UG/ML
250 INJECTION, SOLUTION INTRAMUSCULAR PRN
Status: DISCONTINUED | OUTPATIENT
Start: 2022-09-14 | End: 2022-09-14

## 2022-09-14 RX ORDER — SODIUM CHLORIDE 0.9 % (FLUSH) 0.9 %
5-40 SYRINGE (ML) INJECTION EVERY 12 HOURS SCHEDULED
Status: DISCONTINUED | OUTPATIENT
Start: 2022-09-14 | End: 2022-09-14

## 2022-09-14 RX ORDER — SODIUM CHLORIDE, SODIUM LACTATE, POTASSIUM CHLORIDE, AND CALCIUM CHLORIDE .6; .31; .03; .02 G/100ML; G/100ML; G/100ML; G/100ML
500 INJECTION, SOLUTION INTRAVENOUS PRN
Status: DISCONTINUED | OUTPATIENT
Start: 2022-09-14 | End: 2022-09-14

## 2022-09-14 RX ORDER — MAG HYDROX/ALUMINUM HYD/SIMETH 400-400-40
SUSPENSION, ORAL (FINAL DOSE FORM) ORAL PRN
Status: ACTIVE | OUTPATIENT
Start: 2022-09-14 | End: 2022-09-16

## 2022-09-14 RX ORDER — BUTALBITAL, ACETAMINOPHEN AND CAFFEINE 300; 40; 50 MG/1; MG/1; MG/1
1 CAPSULE ORAL EVERY 4 HOURS PRN
Status: DISCONTINUED | OUTPATIENT
Start: 2022-09-14 | End: 2022-09-17 | Stop reason: HOSPADM

## 2022-09-14 RX ORDER — SODIUM CHLORIDE 0.9 % (FLUSH) 0.9 %
5-40 SYRINGE (ML) INJECTION PRN
Status: DISCONTINUED | OUTPATIENT
Start: 2022-09-14 | End: 2022-09-14

## 2022-09-14 RX ORDER — LIDOCAINE HYDROCHLORIDE AND EPINEPHRINE 15; 5 MG/ML; UG/ML
INJECTION, SOLUTION EPIDURAL PRN
Status: DISCONTINUED | OUTPATIENT
Start: 2022-09-14 | End: 2022-09-14 | Stop reason: SDUPTHER

## 2022-09-14 RX ORDER — SODIUM CHLORIDE, SODIUM LACTATE, POTASSIUM CHLORIDE, CALCIUM CHLORIDE 600; 310; 30; 20 MG/100ML; MG/100ML; MG/100ML; MG/100ML
INJECTION, SOLUTION INTRAVENOUS CONTINUOUS
Status: DISCONTINUED | OUTPATIENT
Start: 2022-09-14 | End: 2022-09-14

## 2022-09-14 RX ORDER — IBUPROFEN 800 MG/1
800 TABLET ORAL EVERY 8 HOURS PRN
Status: DISPENSED | OUTPATIENT
Start: 2022-09-14 | End: 2022-09-16

## 2022-09-14 RX ORDER — DOCUSATE SODIUM 100 MG/1
100 CAPSULE, LIQUID FILLED ORAL 2 TIMES DAILY
Status: DISCONTINUED | OUTPATIENT
Start: 2022-09-14 | End: 2022-09-17 | Stop reason: HOSPADM

## 2022-09-14 RX ORDER — SODIUM CHLORIDE 0.9 % (FLUSH) 0.9 %
5-40 SYRINGE (ML) INJECTION EVERY 12 HOURS SCHEDULED
Status: DISCONTINUED | OUTPATIENT
Start: 2022-09-14 | End: 2022-09-17 | Stop reason: HOSPADM

## 2022-09-14 RX ORDER — SERTRALINE HYDROCHLORIDE 100 MG/1
100 TABLET, FILM COATED ORAL DAILY
Status: DISPENSED | OUTPATIENT
Start: 2022-09-14 | End: 2022-09-16

## 2022-09-14 RX ORDER — SODIUM CHLORIDE 9 MG/ML
INJECTION, SOLUTION INTRAVENOUS PRN
Status: DISCONTINUED | OUTPATIENT
Start: 2022-09-14 | End: 2022-09-17 | Stop reason: HOSPADM

## 2022-09-14 RX ORDER — METHYLERGONOVINE MALEATE 0.2 MG/ML
200 INJECTION INTRAVENOUS PRN
Status: DISCONTINUED | OUTPATIENT
Start: 2022-09-14 | End: 2022-09-14

## 2022-09-14 RX ORDER — ONDANSETRON 4 MG/1
8 TABLET, ORALLY DISINTEGRATING ORAL EVERY 8 HOURS PRN
Status: DISCONTINUED | OUTPATIENT
Start: 2022-09-14 | End: 2022-09-17 | Stop reason: HOSPADM

## 2022-09-14 RX ORDER — FAMOTIDINE 20 MG/1
20 TABLET, FILM COATED ORAL 2 TIMES DAILY
Status: DISCONTINUED | OUTPATIENT
Start: 2022-09-14 | End: 2022-09-17 | Stop reason: HOSPADM

## 2022-09-14 RX ORDER — CALCIUM CARBONATE 200(500)MG
500 TABLET,CHEWABLE ORAL 3 TIMES DAILY PRN
Status: ACTIVE | OUTPATIENT
Start: 2022-09-14 | End: 2022-09-16

## 2022-09-14 RX ORDER — SODIUM CHLORIDE 0.9 % (FLUSH) 0.9 %
5-40 SYRINGE (ML) INJECTION PRN
Status: DISCONTINUED | OUTPATIENT
Start: 2022-09-14 | End: 2022-09-17 | Stop reason: HOSPADM

## 2022-09-14 RX ORDER — ACETAMINOPHEN 325 MG/1
650 TABLET ORAL EVERY 4 HOURS PRN
Status: DISCONTINUED | OUTPATIENT
Start: 2022-09-14 | End: 2022-09-14

## 2022-09-14 RX ORDER — MODIFIED LANOLIN
OINTMENT (GRAM) TOPICAL PRN
Status: DISCONTINUED | OUTPATIENT
Start: 2022-09-14 | End: 2022-09-17 | Stop reason: HOSPADM

## 2022-09-14 RX ORDER — SODIUM CHLORIDE, SODIUM LACTATE, POTASSIUM CHLORIDE, CALCIUM CHLORIDE 600; 310; 30; 20 MG/100ML; MG/100ML; MG/100ML; MG/100ML
INJECTION, SOLUTION INTRAVENOUS CONTINUOUS
Status: DISCONTINUED | OUTPATIENT
Start: 2022-09-14 | End: 2022-09-17 | Stop reason: HOSPADM

## 2022-09-14 RX ORDER — NALBUPHINE HCL 10 MG/ML
5 AMPUL (ML) INJECTION
Status: DISCONTINUED | OUTPATIENT
Start: 2022-09-14 | End: 2022-09-14

## 2022-09-14 RX ORDER — MISOPROSTOL 200 UG/1
800 TABLET ORAL PRN
Status: DISCONTINUED | OUTPATIENT
Start: 2022-09-14 | End: 2022-09-14

## 2022-09-14 RX ORDER — ONDANSETRON 2 MG/ML
4 INJECTION INTRAMUSCULAR; INTRAVENOUS EVERY 6 HOURS PRN
Status: DISCONTINUED | OUTPATIENT
Start: 2022-09-14 | End: 2022-09-14

## 2022-09-14 RX ORDER — SODIUM CHLORIDE, SODIUM LACTATE, POTASSIUM CHLORIDE, AND CALCIUM CHLORIDE .6; .31; .03; .02 G/100ML; G/100ML; G/100ML; G/100ML
1000 INJECTION, SOLUTION INTRAVENOUS PRN
Status: DISCONTINUED | OUTPATIENT
Start: 2022-09-14 | End: 2022-09-14

## 2022-09-14 RX ORDER — ACETAMINOPHEN 500 MG
1000 TABLET ORAL EVERY 8 HOURS PRN
Status: DISPENSED | OUTPATIENT
Start: 2022-09-14 | End: 2022-09-16

## 2022-09-14 RX ORDER — DIPHENHYDRAMINE HCL 25 MG
25 TABLET ORAL EVERY 4 HOURS PRN
Status: DISCONTINUED | OUTPATIENT
Start: 2022-09-14 | End: 2022-09-14

## 2022-09-14 RX ORDER — SODIUM CHLORIDE 9 MG/ML
25 INJECTION, SOLUTION INTRAVENOUS PRN
Status: DISCONTINUED | OUTPATIENT
Start: 2022-09-14 | End: 2022-09-14

## 2022-09-14 RX ADMIN — SERTRALINE 100 MG: 100 TABLET, FILM COATED ORAL at 22:32

## 2022-09-14 RX ADMIN — BENZOCAINE AND LEVOMENTHOL: 200; 5 SPRAY TOPICAL at 12:01

## 2022-09-14 RX ADMIN — SODIUM CHLORIDE, POTASSIUM CHLORIDE, SODIUM LACTATE AND CALCIUM CHLORIDE 1000 ML: 600; 310; 30; 20 INJECTION, SOLUTION INTRAVENOUS at 07:12

## 2022-09-14 RX ADMIN — BUTALBITA,ACETAMINOPHEN AND CAFFEINE 1 CAPSULE: 50; 300; 40 CAPSULE ORAL at 22:32

## 2022-09-14 RX ADMIN — Medication 166.7 ML: at 09:03

## 2022-09-14 RX ADMIN — DEXTROSE MONOHYDRATE 5 MILLION UNITS: 5 INJECTION INTRAVENOUS at 07:58

## 2022-09-14 RX ADMIN — IBUPROFEN 800 MG: 800 TABLET ORAL at 18:33

## 2022-09-14 RX ADMIN — Medication 3 ML: at 07:35

## 2022-09-14 RX ADMIN — Medication 4 ML: at 07:39

## 2022-09-14 RX ADMIN — LIDOCAINE HYDROCHLORIDE AND EPINEPHRINE 3 ML: 15; 5 INJECTION, SOLUTION EPIDURAL at 07:33

## 2022-09-14 RX ADMIN — ACETAMINOPHEN 1000 MG: 500 TABLET ORAL at 12:01

## 2022-09-14 RX ADMIN — ACETAMINOPHEN 1000 MG: 500 TABLET ORAL at 18:33

## 2022-09-14 RX ADMIN — Medication 87.3 MILLI-UNITS/MIN: at 09:16

## 2022-09-14 RX ADMIN — SODIUM CHLORIDE, POTASSIUM CHLORIDE, SODIUM LACTATE AND CALCIUM CHLORIDE 500 ML: 600; 310; 30; 20 INJECTION, SOLUTION INTRAVENOUS at 07:39

## 2022-09-14 RX ADMIN — FAMOTIDINE 20 MG: 20 TABLET, FILM COATED ORAL at 22:32

## 2022-09-14 ASSESSMENT — PAIN SCALES - GENERAL
PAINLEVEL_OUTOF10: 6
PAINLEVEL_OUTOF10: 3
PAINLEVEL_OUTOF10: 0
PAINLEVEL_OUTOF10: 2
PAINLEVEL_OUTOF10: 3

## 2022-09-14 ASSESSMENT — ENCOUNTER SYMPTOMS: SHORTNESS OF BREATH: 1

## 2022-09-14 ASSESSMENT — PAIN DESCRIPTION - LOCATION
LOCATION: ABDOMEN
LOCATION: HEAD

## 2022-09-14 ASSESSMENT — PAIN DESCRIPTION - DESCRIPTORS: DESCRIPTORS: CRAMPING

## 2022-09-14 NOTE — PLAN OF CARE
Problem: Pain  Goal: Verbalizes/displays adequate comfort level or baseline comfort level  2022 110 by Rocío Cabello RN  Outcome: Progressing  2022 08 by Rocío Cabello RN  Outcome: Progressing     Problem: Safety - Adult  Goal: Free from fall injury  2022 by Rocío Cabello RN  Outcome: Progressing  2022 by Rocío Cabello RN  Outcome: Progressing     Problem: Postpartum  Goal: Experiences normal postpartum course  Description:  Postpartum OB-Pregnancy care plan goal which identifies if the mother is experiencing a normal postpartum course  Outcome: Progressing  Goal: Appropriate maternal -  bonding  Description:  Postpartum OB-Pregnancy care plan goal which identifies if the mother and  are bonding appropriately  Outcome: Progressing  Goal: Establishment of infant feeding pattern  Description:  Postpartum OB-Pregnancy care plan goal which identifies if the mother is establishing a feeding pattern with their   Outcome: Progressing     Problem: Discharge Planning  Goal: Discharge to home or other facility with appropriate resources  Outcome: Progressing

## 2022-09-14 NOTE — PROGRESS NOTES
Assisted up to BR with steady gait. Voided 800 cc's of lightly blood tinged urine without difficulty. New gown, peripads and underwear  provided. Dermoplast spray provided with instructions and used. Transferred ambulatory to [facility].  Oriented to room, call light, etc., verbalized understanding

## 2022-09-14 NOTE — ANESTHESIA PRE PROCEDURE
Department of Anesthesiology  Preprocedure Note       Name:  Alcides Bates   Age:  34 y.o.  :  1990                                          MRN:  [id number]         Date:  2022      Surgeon: * No surgeons listed *    Procedure: * No procedures listed *    Medications prior to admission:   Prior to Admission medications    Medication Sig Start Date End Date Taking? Authorizing Provider   Continuous Blood Gluc Sensor (FREESTYLE PATRICIA 2 SENSOR) MISC CHANGE SENSOR EVERY 14 DAYS AND USE TO MONITOR BLOOD GLUCOSE 22   Historical Provider, MD   hydrOXYzine HCl (ATARAX) 10 MG tablet TAKE 1 TO 2 TABLETS BY MOUTH THREE TIMES A DAY AS NEEDED FOR ITCHINESS 22   Historical Provider, MD   Prenatal Vit-Fe Fumarate-FA (PRENATAL 1+1 PO) Take by mouth    Historical Provider, MD   acetaminophen (TYLENOL) 500 MG tablet Take 500 mg by mouth every 6 hours as needed for Pain    Historical Provider, MD   Lancets 30G MISC Use as instructed 4 times daily 22   Historical Provider, MD   blood glucose monitor strips Use as instructed 4 times daily 22   Historical Provider, MD   glucose monitoring (FREESTYLE) kit 1 each by Other route 4 times daily 22   Historical Provider, MD   sertraline (ZOLOFT) 100 MG tablet Take 150 mg by mouth in the morning.  22   Historical Provider, MD       Current medications:    Current Facility-Administered Medications   Medication Dose Route Frequency Provider Last Rate Last Admin    miSOPROStol (CYTOTEC) pre-split tablet TABS 25 mcg  25 mcg Oral every 2 hours Pia Ty, DO        oxytocin (PITOCIN) 30 units in 500 mL infusion  1 yanira-units/min IntraVENous Continuous Pia Ty, DO        lactated ringers infusion   IntraVENous Continuous Pia Ty, DO        lactated ringers bolus  500 mL IntraVENous PRN Pia Ty,  mL/hr at 22 0739 500 mL at 22 0739    Or    lactated ringers bolus  1,000 mL IntraVENous PRN Pia Ty,  mL/hr at 22 0712 1,000 mL at 22 8518    sodium chloride flush 0.9 % injection 5-40 mL  5-40 mL IntraVENous 2 times per day Eloise Rivera, DO        sodium chloride flush 0.9 % injection 5-40 mL  5-40 mL IntraVENous PRN Eloise Rivera, DO        0.9 % sodium chloride infusion  25 mL IntraVENous PRN Eloise Rivera, DO        nalbuphine (NUBAIN) injection 5 mg  5 mg IntraMUSCular Once PRN Eloise Rivera, DO        And    nalbuphine (NUBAIN) injection 5 mg  5 mg IntraVENous Q3H PRN Varghese Barton, DO        ondansetron [facility]) injection 4 mg  4 mg IntraVENous Q6H PRN Eloise Rivera, DO        diphenhydrAMINE (BENADRYL) tablet 25 mg  25 mg Oral Q4H PRN Eloise Rivera, DO        methylergonovine (METHERGINE) injection 200 mcg  200 mcg IntraMUSCular PRN Eloise Rivera, DO        carboprost (HEMABATE) injection 250 mcg  250 mcg IntraMUSCular PRN Eloise Rivera, DO        miSOPROStol (CYTOTEC) tablet 800 mcg  800 mcg Rectal PRN Eloise Rivera, DO        tranexamic acid (CYKLOKAPRON) 1,000 mg in sodium chloride 0.9 % 110 mL IVPB (mini-bag)  1,000 mg IntraVENous Once PRN Eloise Rivera, DO        acetaminophen (TYLENOL) tablet 650 mg  650 mg Oral Q4H PRN Varghese Barton, DO        benzocaine-menthol (DERMOPLAST) 20-0.5 % spray   Topical PRN Eloise Rivera, DO        docusate sodium (COLACE) capsule 100 mg  100 mg Oral BID PRN Varghese Barton, DO        fentaNYL 125 mcg, ropivacaine 0.2% in sodium chloride 0.9% 127.5ml (OB) epidural                Allergies:     Allergies   Allergen Reactions    Sulfa Antibiotics Itching       Problem List:    Patient Active Problem List   Diagnosis Code    Dehydration during pregnancy O99.280, E86.0    Normal labor O80, Z37.9     uterine contractions in third trimester, antepartum O47.03    Abnormal glucose complicating pregnancy G88.367    Allergic rhinitis J30.9    Anxiety F41.9    Arthritis of knee M17.10    Results   Component Value Date/Time     2022 07:00 AM    K 3.5 2022 07:00 AM     2022 07:00 AM    CO2 18 2022 07:00 AM    BUN 6 2022 07:00 AM    CREATININE 0.48 2022 07:00 AM    GFRAA >60.0 2022 07:00 AM    LABGLOM >60.0 2022 07:00 AM    GLUCOSE 98 2022 07:00 AM    PROT 5.7 2022 07:00 AM    CALCIUM 8.7 2022 07:00 AM    BILITOT <0.2 2022 07:00 AM    ALKPHOS 204 2022 07:00 AM    AST 24 2022 07:00 AM    ALT 7 2022 07:00 AM       POC Tests: No results for input(s): POCGLU, POCNA, POCK, POCCL, POCBUN, POCHEMO, POCHCT in the last 72 hours. Coags: No results found for: PROTIME, INR, APTT    HCG (If Applicable): No results found for: PREGTESTUR, PREGSERUM, HCG, HCGQUANT     ABGs: No results found for: PHART, PO2ART, GRL9FUO, OSY4VTA, BEART, Z1QNRNSO     Type & Screen (If Applicable):  No results found for: LABABO, LABRH    Drug/Infectious Status (If Applicable):  No results found for: HIV, HEPCAB    COVID-19 Screening (If Applicable):   Lab Results   Component Value Date/Time    COVID19 Not Detected 2022 07:09 AM           Anesthesia Evaluation  Patient summary reviewed and Nursing notes reviewed no history of anesthetic complications:   Airway: Mallampati: IV  TM distance: >3 FB   Neck ROM: full  Mouth opening: < 3 FB   Dental: normal exam         Pulmonary:normal exam    (+) shortness of breath:                             Cardiovascular:Negative CV ROS  Exercise tolerance: good (>4 METS),   (+) BRITO:,                   Neuro/Psych:   (+) headaches:, psychiatric history:             ROS comment: scoliosis GI/Hepatic/Renal:   (+) GERD:,           Endo/Other: Negative Endo/Other ROS                    Abdominal:   (+) obese,           Vascular: negative vascular ROS. Other Findings:  IUP          Anesthesia Plan      epidural     ASA 3             Anesthetic plan and risks discussed with patient.     Use of blood products discussed with patient whom. Plan discussed with CRNA.                     VESNA John - CRNA   9/14/2022

## 2022-09-14 NOTE — LACTATION NOTE
This note was copied from a baby's chart. Infant skin to skin with mom. Had attempted to breast feed and infant did not latch. Assisted with latch to left breast. Infant opens mouth but does not latch, sleepy. Needs aroused to stay awake. Talked to mother about watching infant's blood sugar. Mother states she desires to breast and bottle feed. Discussed possibility of donor milk if infant does not latch with initial feeding. Mother desires to use donor milk if needed. Consents signed.

## 2022-09-14 NOTE — PROGRESS NOTES
Washed up with warm washcloths, New gown and peripads provided. Denies need to void at this time. Legs still a little numb. Instructed to call for assistance up to BR when feels the urge to void,  verbalized understanding.  Recovery complete

## 2022-09-14 NOTE — ANESTHESIA PROCEDURE NOTES
Epidural Block    Patient location during procedure: OB  Start time: 9/14/2022 7:21 AM  End time: 9/14/2022 7:36 AM  Reason for block: labor epidural  Staffing  Performed: resident/CRNA   Resident/CRNA: VESNA Gee CRNA  Epidural  Patient position: sitting  Prep: DuraPrep  Patient monitoring: continuous pulse ox and frequent blood pressure checks  Approach: midline  Location: L3-4  Injection technique: ARISTIDES saline  Provider prep: mask and sterile gloves  Needle  Needle type: Tuohy   Needle gauge: 17 G  Needle length: 3.5 in  Needle insertion depth: 6 cm  Catheter type: end hole  Catheter size: 20 G  Catheter at skin depth: 12 cm  Test dose: negative  Kit: perifix  Lot number: [id number]  Expiration date: 9/30/2023Catheter Secured: tegaderm and tape  Assessment  Sensory level: T6  Hemodynamics: stable  Attempts: 1  Outcomes: uncomplicated and patient tolerated procedure well  Additional Notes  Negative heme, negative CSF, negative paresthesia.    Preanesthetic Checklist  Completed: patient identified, IV checked, site marked, risks and benefits discussed, surgical/procedural consents, equipment checked, pre-op evaluation, timeout performed, anesthesia consent given, oxygen available, monitors applied/VS acknowledged, fire risk safety assessment completed and verbalized and blood product R/B/A discussed and consented

## 2022-09-14 NOTE — L&D DELIVERY SUMMARY NOTE
Department of Obstetrics and Gynecology  Spontaneous Vaginal Delivery Note      Pt Name: Regino Dalton  MRN: [id number] Acct #: [de-identified]  YOB: 1990  Procedure Performed By: Sania Mcelroy DO, MD      Pre-operative Diagnosis:  Term pregnancy  Post-operative Diagnosis: Same, delivered. Procedure:  Spontaneous vaginal delivery  Surgeon:  Sania Mcelroy DO    Information for the patient's :  Lady Guevara [[id number]]        Anesthesia:  epidural anesthesia  Estimated blood loss:  300ml  Specimen:  Placenta sent to pathology   Complications:  none  Condition:  infant stable to general nursery    Details of Procedure: The patient is a 34 y.o. female at 36w7d   OB History          2    Para   1    Term                AB        Living             SAB        IAB        Ectopic        Molar        Multiple        Live Births                 who was admitted for active phase labor. She received the following interventions: IV Pitocin augmentation. The patient progressed well,did receive an epidural, became complete and started to push. She was placed in the dorsal lithotomy position and prepped. She delivered the vertex over an intact perineum . The nose and mouth were suctioned with bulb suction and the rest of the infant delivered atraumatically, placed on mother abdomen. Cord was clamped and cut and infant handed off to the waiting nurse for evaluation after mom had adequate time for bonding unless needed to be evaluated sooner. The placenta delivered intact and intact, whole and that the umbilical cord had three vessels noted. The perineum and vagina were explored and a second degree laceration was repaired in standard fashion with 3-0 Vicryl. A vaginal sweep was performed and there were no retained products. Sponge, needle and instrument counts were correct. Delivery Summary: viable female at 0. Apgar 7/8. Ebl 300cc. Fhr reactive. pt GDM A 1 with good control  Information for the patient's :  Kylah Andrews [[id number]]      Labor & Delivery Summary  Dilation Complete Date: 22  Dilation Complete Time: 0818       PMH:  History reviewed. No pertinent past medical history.     Dana Barrientos DO 2022 9:13 AM

## 2022-09-14 NOTE — H&P
Department of Obstetrics and Gynecology   History & Physical    Pt Name: Katarzyna Kohli  MRN: [id number] [place] #: [de-identified]  YOB: 1990  Estimated Date of Delivery: 10/6/22      HPI: The patient is a 34 y.o.  female  who presents in active labor at 39 6/7 weeks . SROM. GDM diet control    Allergies:     Allergies as of 2022 - Fully Reviewed 2022   Allergen Reaction Noted    Sulfa antibiotics Itching 2022       Medications:    Current Facility-Administered Medications:     miSOPROStol (CYTOTEC) pre-split tablet TABS 25 mcg, 25 mcg, Oral, every 2 hours, Dali Amaro DO    oxytocin (PITOCIN) 30 units in 500 mL infusion, 1 yanira-units/min, IntraVENous, Continuous, Fouzia Barton DO    lactated ringers infusion, , IntraVENous, Continuous, Andre Barton DO    lactated ringers bolus, 500 mL, IntraVENous, PRN **OR** lactated ringers bolus, 1,000 mL, IntraVENous, PRN, Fouzia Barton, DO    sodium chloride flush 0.9 % injection 5-40 mL, 5-40 mL, IntraVENous, 2 times per day, Dali Amaro, DO    sodium chloride flush 0.9 % injection 5-40 mL, 5-40 mL, IntraVENous, PRN, Fouzia Barton DO    0.9 % sodium chloride infusion, 25 mL, IntraVENous, PRN, Fouzia Harryon, DO    nalbuphine (NUBAIN) injection 5 mg, 5 mg, IntraMUSCular, Once PRN **AND** nalbuphine (NUBAIN) injection 5 mg, 5 mg, IntraVENous, Q3H PRN, Fouzia Barton, DO    ondansetron [facility]) injection 4 mg, 4 mg, IntraVENous, Q6H PRN, Fouzia Barton, DO    diphenhydrAMINE (BENADRYL) tablet 25 mg, 25 mg, Oral, Q4H PRN, Fouzia Barton, DO    methylergonovine (METHERGINE) injection 200 mcg, 200 mcg, IntraMUSCular, PRN, Fouzia Harryon, DO    carboprost (HEMABATE) injection 250 mcg, 250 mcg, IntraMUSCular, PRN, Fouzia Barton,     miSOPROStol (CYTOTEC) tablet 800 mcg, 800 mcg, Rectal, PRN, Fouzia Barton,     tranexamic acid (CYKLOKAPRON) 1,000 mg in sodium chloride 0.9 % 110 mL IVPB (mini-bag), 1,000 mg, IntraVENous, Once PRN, Hai Bruce, DO    acetaminophen (TYLENOL) tablet 650 mg, 650 mg, Oral, Q4H PRN, Reinaldo Barton DO    benzocaine-menthol (DERMOPLAST) 20-0.5 % spray, , Topical, PRN, Reinaldo Barton DO    docusate sodium (COLACE) capsule 100 mg, 100 mg, Oral, BID PRN, Hai Bruce, DO    OB History:     Gyn History: Denies h/o abnormal pap smear, h/o STDs. Past Medical History: No past medical history on file. Past Surgical History:   Past Surgical History:   Procedure Laterality Date    ANKLE SURGERY  2018    GASTRIC BYPASS SURGERY  10/2018       Social History:   Social History     Tobacco Use   Smoking Status Not on file   Smokeless Tobacco Not on file        Family History: Noncontributory; Denies h/o cancer. ROS:  Negative except as stated in HPI, denies nausea, vomiting, fever, chills, headache or dysuria. PE:  There were no vitals filed for this visit. General: well nourished, well developed, in no acute distress  CV: Normal heart sounds  Resp: breathing unlabored  Abdomen: Nontender, no rebound, no guarding  FH:  Cx:5/100/-1    NST - Cat 1: FHR 140s, moderate variability, +accels, -decels    Labs:       Assessment:   34 y.o.  female with  active labor 36 6/7 weeks.     Plan:   Anticipate     Hai Bruce DO

## 2022-09-15 LAB
HEMOGLOBIN: 10.4 G/DL (ref 12–16)
URINE CULTURE, ROUTINE: NORMAL

## 2022-09-15 PROCEDURE — 6370000000 HC RX 637 (ALT 250 FOR IP): Performed by: OBSTETRICS & GYNECOLOGY

## 2022-09-15 PROCEDURE — 36415 COLL VENOUS BLD VENIPUNCTURE: CPT

## 2022-09-15 PROCEDURE — 99024 POSTOP FOLLOW-UP VISIT: CPT | Performed by: OBSTETRICS & GYNECOLOGY

## 2022-09-15 PROCEDURE — 85018 HEMOGLOBIN: CPT

## 2022-09-15 PROCEDURE — 1220000000 HC SEMI PRIVATE OB R&B

## 2022-09-15 RX ORDER — OXYCODONE HYDROCHLORIDE 5 MG/1
5 TABLET ORAL EVERY 4 HOURS PRN
Status: DISCONTINUED | OUTPATIENT
Start: 2022-09-15 | End: 2022-09-17 | Stop reason: HOSPADM

## 2022-09-15 RX ADMIN — DOCUSATE SODIUM 100 MG: 100 CAPSULE, LIQUID FILLED ORAL at 09:28

## 2022-09-15 RX ADMIN — IBUPROFEN 800 MG: 800 TABLET ORAL at 07:08

## 2022-09-15 RX ADMIN — OXYCODONE 5 MG: 5 TABLET ORAL at 20:47

## 2022-09-15 RX ADMIN — ACETAMINOPHEN 1000 MG: 500 TABLET ORAL at 15:45

## 2022-09-15 RX ADMIN — SERTRALINE 100 MG: 100 TABLET, FILM COATED ORAL at 20:47

## 2022-09-15 RX ADMIN — IBUPROFEN 800 MG: 800 TABLET ORAL at 23:43

## 2022-09-15 RX ADMIN — OXYCODONE 5 MG: 5 TABLET ORAL at 11:42

## 2022-09-15 RX ADMIN — DOCUSATE SODIUM 100 MG: 100 CAPSULE, LIQUID FILLED ORAL at 20:47

## 2022-09-15 RX ADMIN — FAMOTIDINE 20 MG: 20 TABLET, FILM COATED ORAL at 09:28

## 2022-09-15 RX ADMIN — ACETAMINOPHEN 1000 MG: 500 TABLET ORAL at 07:08

## 2022-09-15 RX ADMIN — ACETAMINOPHEN 1000 MG: 500 TABLET ORAL at 23:43

## 2022-09-15 RX ADMIN — IBUPROFEN 800 MG: 800 TABLET ORAL at 15:45

## 2022-09-15 RX ADMIN — FAMOTIDINE 20 MG: 20 TABLET, FILM COATED ORAL at 20:47

## 2022-09-15 ASSESSMENT — PAIN SCALES - GENERAL
PAINLEVEL_OUTOF10: 5
PAINLEVEL_OUTOF10: 4
PAINLEVEL_OUTOF10: 5

## 2022-09-15 ASSESSMENT — PAIN DESCRIPTION - DESCRIPTORS: DESCRIPTORS: CRAMPING

## 2022-09-15 ASSESSMENT — PAIN DESCRIPTION - LOCATION
LOCATION: ABDOMEN
LOCATION: ABDOMEN

## 2022-09-15 ASSESSMENT — PAIN DESCRIPTION - ORIENTATION: ORIENTATION: INNER;LOWER

## 2022-09-15 NOTE — FLOWSHEET NOTE
Dr Susan Damian called, re patient pain, only ordered Tylenol/Motrin, pt with 2nd degree laceration. Orders rec'd.

## 2022-09-15 NOTE — LACTATION NOTE
This note was copied from a baby's chart.  -mom reports baby latches to breast, sucks a few times and then falls asleep  -has been pumping and finger feeding donor milk  -baby born at 38wks, mom had GDM with this pregnancy  -accuchecks WNL  -assessment of baby's oral anatomy reveals thick upper labial frenulum  -slightly recessed chin  -tongue is held posterior in mouth, elevation/extension not observed  -uncoordinated suck pattern  -recommend OT, parents agreeable  -continue frequent skin to skin and putting baby to breast per feeding cues  -pump after feeds and supplement with expressed colostrum and/or donor milk as needed  -call for [facility] assistance with feeds  -mom and dad both verbalize understanding of teaching    1310-follow up  -baby swaddled and being held by visitor  -mom states she will attempt to feed soon  -encouraged to call for [facility] assistance    1405-follow up   -baby at right breast in football hold upon entering room  -deep latch and rhythmic sucking noted  -mom denies pain  -offered support and encouragement  -phone call received from OT during time of visit, will see pt first thing in a.m., parents aware  -recommend continue putting baby to breast per hunger cues and using stimulation techniques to encouraged active sucking  -pump after feeds  -supplement expressed colostrum and/or donor milk as needed via finger feeding method  -16cc of pt's own expressed colostrum available in syringe  -dad will finger feed and mom to pump  -[facility] team will continue to follow

## 2022-09-15 NOTE — PROGRESS NOTES
Subjective:       34 y.o.   @ 36w6d    Postpartum Day 1: Vaginal Delivery on     The patient feels well. The patient denies emotional concerns. Pain is well controlled with current medications. The baby iswell. Baby is feeding via breast. The patient is ambulating well. The patient is tolerating a normal diet. Objective:      Patient Vitals for the past 8 hrs:   BP Temp Temp src Pulse Resp SpO2   09/15/22 0557 115/75 97.3 °F (36.3 °C) Oral 65 18 99 %     General:    alert, appears stated age, and cooperative   Bowel Sounds:  active   Lochia:  appropriate   Uterine Fundus:   Firm @ U-2   Perineum:  Intact   DVT Evaluation:  No evidence of DVT seen on physical exam.         Assessment:     Status post Vaginal Delivery. Plan: 1. Routine postpartum care.   2. Discharge Planning initiated      Sim Momin DO  September 15, 2022

## 2022-09-16 VITALS
OXYGEN SATURATION: 99 % | TEMPERATURE: 98.2 F | DIASTOLIC BLOOD PRESSURE: 75 MMHG | HEART RATE: 75 BPM | RESPIRATION RATE: 16 BRPM | SYSTOLIC BLOOD PRESSURE: 128 MMHG

## 2022-09-16 PROCEDURE — 1220000000 HC SEMI PRIVATE OB R&B

## 2022-09-16 PROCEDURE — S9443 LACTATION CLASS: HCPCS

## 2022-09-16 PROCEDURE — 6370000000 HC RX 637 (ALT 250 FOR IP): Performed by: OBSTETRICS & GYNECOLOGY

## 2022-09-16 PROCEDURE — 99024 POSTOP FOLLOW-UP VISIT: CPT | Performed by: OBSTETRICS & GYNECOLOGY

## 2022-09-16 RX ORDER — IBUPROFEN 800 MG/1
800 TABLET ORAL EVERY 8 HOURS PRN
Status: DISCONTINUED | OUTPATIENT
Start: 2022-09-16 | End: 2022-09-17 | Stop reason: HOSPADM

## 2022-09-16 RX ORDER — IBUPROFEN 600 MG/1
600 TABLET ORAL 4 TIMES DAILY PRN
Qty: 40 TABLET | Refills: 0 | Status: SHIPPED | OUTPATIENT
Start: 2022-09-16

## 2022-09-16 RX ADMIN — FAMOTIDINE 20 MG: 20 TABLET, FILM COATED ORAL at 21:23

## 2022-09-16 RX ADMIN — OXYCODONE 5 MG: 5 TABLET ORAL at 07:58

## 2022-09-16 RX ADMIN — DOCUSATE SODIUM 100 MG: 100 CAPSULE, LIQUID FILLED ORAL at 21:23

## 2022-09-16 RX ADMIN — OXYCODONE 5 MG: 5 TABLET ORAL at 17:13

## 2022-09-16 RX ADMIN — OXYCODONE 5 MG: 5 TABLET ORAL at 03:06

## 2022-09-16 RX ADMIN — OXYCODONE 5 MG: 5 TABLET ORAL at 12:36

## 2022-09-16 RX ADMIN — OXYCODONE 5 MG: 5 TABLET ORAL at 21:24

## 2022-09-16 RX ADMIN — DOCUSATE SODIUM 100 MG: 100 CAPSULE, LIQUID FILLED ORAL at 07:58

## 2022-09-16 RX ADMIN — IBUPROFEN 800 MG: 800 TABLET ORAL at 10:28

## 2022-09-16 ASSESSMENT — PAIN DESCRIPTION - LOCATION
LOCATION: ABDOMEN
LOCATION: ABDOMEN
LOCATION: ABDOMEN;BACK
LOCATION: ABDOMEN

## 2022-09-16 ASSESSMENT — PAIN DESCRIPTION - DESCRIPTORS
DESCRIPTORS: ACHING;CRAMPING
DESCRIPTORS: CRAMPING
DESCRIPTORS: CRAMPING
DESCRIPTORS: ACHING;CRAMPING
DESCRIPTORS: ACHING;CRAMPING

## 2022-09-16 ASSESSMENT — PAIN SCALES - GENERAL
PAINLEVEL_OUTOF10: 5
PAINLEVEL_OUTOF10: 5
PAINLEVEL_OUTOF10: 6
PAINLEVEL_OUTOF10: 5
PAINLEVEL_OUTOF10: 5
PAINLEVEL_OUTOF10: 4

## 2022-09-16 ASSESSMENT — PAIN DESCRIPTION - ORIENTATION
ORIENTATION: LOWER
ORIENTATION: MID;LOWER
ORIENTATION: LOWER
ORIENTATION: LOWER

## 2022-09-16 NOTE — DISCHARGE SUMMARY
Vaginal Delivery PP Note/Obstetric Discharge Summary      Subjective:       34 y.o.  [place] Began @ 36w6d    Postpartum Day 2: Vaginal Delivery on  @  for baby girl    The patient feels well. The patient denies emotional concerns. Pain is well controlled with current medications. The baby iswell. Baby is feeding via breast. The patient is ambulating well. The patient is tolerating a normal diet. Objective:      Patient Vitals for the past 8 hrs:   BP Pulse Resp SpO2   22 0802 120/67 65 18 100 %     General:    alert, appears stated age, and cooperative   Bowel Sounds:  active   Lochia:  appropriate   Uterine Fundus:   firm   Perineum: Episiotomy no  Lacerationno     DVT Evaluation:  No evidence of DVT seen on physical exam.     No results found for: CBC      Assessment:     Status post vaginal delivery . Doing well postoperatively. Plan:     Discharge home with standard precautions and return to clinic in 4-6 weeks. Routine postpartum instructions reviewed with patient. Family planning concern discussed with patient.      Reasons for Admission on 2022  6:21 AM  Onset of Labor    Prenatal Procedures  None    Intrapartum Procedures      Postpartum Procedures  None    [place] Data  Information for the patient's :  Fabby Adam Girl Dannielle Archibald" [[id number]]   female   Birth Weight: 8 lb 1.3 oz (3.665 kg)   Discharge With Mother  Complications: No    Discharge Diagnosis  Patient Active Problem List    Diagnosis Date Noted    Normal labor 2022     uterine contractions in third trimester, antepartum 2022    Eczema 2022    Obesity 2022    Excessive fetal growth affecting management of pregnancy in third trimester 2022    Abnormal glucose complicating pregnancy     Threatened  labor, third trimester 2022    Dehydration during pregnancy 2022    Itching of both hands 2022    Allergic rhinitis 2022    Anxiety 2022 Arthritis of knee 05/13/2022    Cobalamin deficiency 05/13/2022    Fibrocystic breast changes 05/13/2022    Gastroenteritis 05/13/2022    Gastroesophageal reflux disease 05/13/2022    Lipoma of torso 05/13/2022    Scoliosis 05/13/2022    Vitamin D deficiency 05/13/2022    Cervical polyp 03/25/2022    Vaginal bleeding before 22 weeks gestation 03/25/2022    Rubella non-immune status, antepartum 03/16/2022    Backache 07/15/2019    BRITO (dyspnea on exertion) 07/15/2019    Palpitations 07/15/2019    Anxiety with depression 01/03/2019    Migraine headache 10/14/2016       Discharge Information  Current Discharge Medication List        CONTINUE these medications which have NOT CHANGED    Details   Continuous Blood Gluc Sensor (FREESTYLE PATRICIA 2 SENSOR) [facility] CHANGE SENSOR EVERY 14 DAYS AND USE TO MONITOR BLOOD GLUCOSE      hydrOXYzine HCl (ATARAX) 10 MG tablet TAKE 1 TO 2 TABLETS BY MOUTH THREE TIMES A DAY AS NEEDED FOR ITCHINESS      Prenatal Vit-Fe Fumarate-FA (PRENATAL 1+1 PO) Take by mouth      acetaminophen (TYLENOL) 500 MG tablet Take 500 mg by mouth every 6 hours as needed for Pain      Lancets 30G MISC Use as instructed 4 times daily      blood glucose monitor strips Use as instructed 4 times daily      glucose monitoring (FREESTYLE) kit 1 each by Other route 4 times daily      sertraline (ZOLOFT) 100 MG tablet Take 150 mg by mouth in the morning.                Discharge to: Home        Discharge Date: 9/16

## 2022-09-16 NOTE — PLAN OF CARE
Problem: Pain  Goal: Verbalizes/displays adequate comfort level or baseline comfort level  Outcome: Progressing     Problem: Safety - Adult  Goal: Free from fall injury  Outcome: Progressing     Problem: Postpartum  Goal: Experiences normal postpartum course  Description:  Postpartum OB-Pregnancy care plan goal which identifies if the mother is experiencing a normal postpartum course  Outcome: Progressing  Goal: Appropriate maternal -  bonding  Description:  Postpartum OB-Pregnancy care plan goal which identifies if the mother and  are bonding appropriately  Outcome: Progressing  Goal: Establishment of infant feeding pattern  Description:  Postpartum OB-Pregnancy care plan goal which identifies if the mother is establishing a feeding pattern with their   Outcome: Progressing     Problem: Discharge Planning  Goal: Discharge to home or other facility with appropriate resources  Outcome: Progressing

## 2022-09-16 NOTE — LACTATION NOTE
In to visit mother  Infant to left breast with football hold  Infant latched takes a few sucks then holding nipple in mouth  Lower lip pulled inwards  Lip readjusted  Encouraged mother to breast feed every 2-3 hours for 10-20 minutes per breast  Reviewed the supply and demand of breast feeding intake and out put of the   Mother supplementing with donor     18 Occupational therapy in room      9253  Mother states infant nursed for 10 minute on each breast and  20 cc of donor milk via  a bottle and a red nipple  Parents request the red nipple  Encouraged mother to express breast milk every 3 hours for at least 10-20 minutes and to save all breast milk for infant  Encouraged mother to feed infant her expressed breast milk first then donor milk  Mother voices understanding    0  Took  35 cc of donor milk to parents  Infant under bili lights   Infant sleeping  Mother states she expressed 20 cc earlier and fed the infant   Mother pumping her breast

## 2022-09-17 LAB — GROUP B STREP CULTURE: NORMAL

## 2022-10-27 ENCOUNTER — OFFICE VISIT (OUTPATIENT)
Dept: OBGYN CLINIC | Age: 32
End: 2022-10-27
Payer: MEDICAID

## 2022-10-27 VITALS
DIASTOLIC BLOOD PRESSURE: 62 MMHG | HEART RATE: 72 BPM | BODY MASS INDEX: 32.74 KG/M2 | SYSTOLIC BLOOD PRESSURE: 110 MMHG | WEIGHT: 179 LBS

## 2022-10-27 PROCEDURE — G8484 FLU IMMUNIZE NO ADMIN: HCPCS | Performed by: OBSTETRICS & GYNECOLOGY

## 2022-10-27 PROCEDURE — G8417 CALC BMI ABV UP PARAM F/U: HCPCS | Performed by: OBSTETRICS & GYNECOLOGY

## 2022-10-27 PROCEDURE — 4004F PT TOBACCO SCREEN RCVD TLK: CPT | Performed by: OBSTETRICS & GYNECOLOGY

## 2022-10-27 PROCEDURE — G8427 DOCREV CUR MEDS BY ELIG CLIN: HCPCS | Performed by: OBSTETRICS & GYNECOLOGY

## 2022-10-27 NOTE — PROGRESS NOTES
SUBJECTIVE:   34 y.o. [place] Care here for post partum exam. Pt breast feeding without difficulty. Pt with irregular VB since delivery and no complaints. Review of Systems:  General ROS: negative  Psychological ROS: negative  ENT ROS: negative  Endocrine ROS: negative  Respiratory ROS: no cough, shortness of breath, or wheezing  Cardiovascular ROS: no chest pain or dyspnea on exertion  Gastrointestinal ROS: no abdominal pain, change in bowel habits, or black or bloody stools  Genito-Urinary ROS: no dysuria, trouble voiding, or hematuria  Musculoskeletal ROS: negative  Neurological ROS: no TIA or stroke symptoms  Dermatological ROS: negative    OBJECTIVE:   /62   Pulse 72   Wt 179 lb (81.2 kg)   LMP 12/30/2021   Breastfeeding Yes   BMI 32.74 kg/m²     Physical Exam:  GEN: She appears well, afebrile. HEENT: normal cephalic, atraumatic  CVS: regular rate and rhythm  ABDOMEN: benign, soft, nontender, no masses. MUSCULOSKELETAL: normal gait, no masses  SKIN: normal texture and tone, no lesions  NEURO: normal tone, no hyperreflexia, 1+DTRs throughout    Pelvic Exam:   ASSESSMENT:   Post partum care and exam  Lactational amenorrhea  PLAN: IUD consult    Past medical, social and family history reviewed and updated in pt's chart. Post partum care reviewed with patient. Pt adjusting well to infant and denies any depressions sx.

## 2022-11-18 ENCOUNTER — PROCEDURE VISIT (OUTPATIENT)
Dept: OBGYN CLINIC | Age: 32
End: 2022-11-18
Payer: MEDICAID

## 2022-11-18 VITALS
BODY MASS INDEX: 32.94 KG/M2 | HEART RATE: 84 BPM | WEIGHT: 179 LBS | SYSTOLIC BLOOD PRESSURE: 122 MMHG | HEIGHT: 62 IN | DIASTOLIC BLOOD PRESSURE: 78 MMHG

## 2022-11-18 DIAGNOSIS — Z30.430 ENCOUNTER FOR IUD INSERTION: ICD-10-CM

## 2022-11-18 DIAGNOSIS — Z32.02 URINE PREGNANCY TEST NEGATIVE: ICD-10-CM

## 2022-11-18 DIAGNOSIS — N92.6 IRREGULAR MENSES: Primary | ICD-10-CM

## 2022-11-18 PROCEDURE — 58300 INSERT INTRAUTERINE DEVICE: CPT | Performed by: ADVANCED PRACTICE MIDWIFE

## 2022-11-18 PROCEDURE — 81025 URINE PREGNANCY TEST: CPT | Performed by: ADVANCED PRACTICE MIDWIFE

## 2022-11-18 RX ORDER — AMOXICILLIN 500 MG/1
CAPSULE ORAL
COMMUNITY
Start: 2022-11-17

## 2022-11-18 RX ORDER — LEVONORGESTREL 52 MG/1
INTRAUTERINE DEVICE INTRAUTERINE
COMMUNITY
Start: 2022-11-04

## 2022-11-18 RX ORDER — PANTOPRAZOLE SODIUM 40 MG/1
TABLET, DELAYED RELEASE ORAL
COMMUNITY
Start: 2022-11-15

## 2022-11-18 RX ORDER — IBUPROFEN 800 MG/1
TABLET ORAL
COMMUNITY
Start: 2022-11-17

## 2022-11-18 RX ORDER — BLOOD SUGAR DIAGNOSTIC
STRIP MISCELLANEOUS
COMMUNITY
Start: 2022-10-27

## 2022-11-18 ASSESSMENT — PATIENT HEALTH QUESTIONNAIRE - PHQ9
2. FEELING DOWN, DEPRESSED OR HOPELESS: 0
6. FEELING BAD ABOUT YOURSELF - OR THAT YOU ARE A FAILURE OR HAVE LET YOURSELF OR YOUR FAMILY DOWN: 0
SUM OF ALL RESPONSES TO PHQ QUESTIONS 1-9: 0
SUM OF ALL RESPONSES TO PHQ QUESTIONS 1-9: 0
10. IF YOU CHECKED OFF ANY PROBLEMS, HOW DIFFICULT HAVE THESE PROBLEMS MADE IT FOR YOU TO DO YOUR WORK, TAKE CARE OF THINGS AT HOME, OR GET ALONG WITH OTHER PEOPLE: 0
1. LITTLE INTEREST OR PLEASURE IN DOING THINGS: 0
3. TROUBLE FALLING OR STAYING ASLEEP: 0
SUM OF ALL RESPONSES TO PHQ QUESTIONS 1-9: 0
9. THOUGHTS THAT YOU WOULD BE BETTER OFF DEAD, OR OF HURTING YOURSELF: 0
5. POOR APPETITE OR OVEREATING: 0
SUM OF ALL RESPONSES TO PHQ QUESTIONS 1-9: 0
SUM OF ALL RESPONSES TO PHQ9 QUESTIONS 1 & 2: 0
4. FEELING TIRED OR HAVING LITTLE ENERGY: 0
8. MOVING OR SPEAKING SO SLOWLY THAT OTHER PEOPLE COULD HAVE NOTICED. OR THE OPPOSITE, BEING SO FIGETY OR RESTLESS THAT YOU HAVE BEEN MOVING AROUND A LOT MORE THAN USUAL: 0
7. TROUBLE CONCENTRATING ON THINGS, SUCH AS READING THE NEWSPAPER OR WATCHING TELEVISION: 0

## 2022-11-18 NOTE — PROGRESS NOTES
IUD Insertion Procedure Note    Pre-operative Diagnosis:  Irregular Periods    Post-operative Diagnosis:  Same    Indications:  Same    Procedure Details:   Urine pregnancy test was done and result was negative. The risks (including infection, bleeding, pain, and uterine perforation) and benefits of the procedure were explained to the patient and Written informed consent was obtained. A Time Out was performed with patient participation prior to beginning the procedure. Cervix cleansed with Betadine. Uterus sounded to 7 cm. Mirena IUD inserted without difficulty. Strings visible and trimmed. Patient tolerated procedure well. Condition:  Stable    Complications:  None    Plan:  The patient was advised to call for any fever or for prolonged or severe pain or bleeding. She was advised to use OTC Ibuprofen or Tylenol as needed for mild to moderate pain. Follow up:  Return in about 4 weeks (around 12/16/2022) for Follow-up on new medication, (virtual is fine).     VESNA Reynoso CNM

## 2023-04-02 PROBLEM — R07.9 CHEST PAIN: Status: ACTIVE | Noted: 2023-04-02

## 2023-04-02 PROBLEM — F41.9 ANXIETY: Status: ACTIVE | Noted: 2023-04-02

## 2023-04-02 PROBLEM — K52.9 GASTROENTERITIS: Status: ACTIVE | Noted: 2023-04-02
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
## 2023-04-02 PROBLEM — E53.8 VITAMIN B 12 DEFICIENCY: Status: ACTIVE | Noted: 2023-04-02

## 2023-04-02 PROBLEM — L29.9 ITCHING: Status: ACTIVE | Noted: 2023-04-02

## 2023-04-02 PROBLEM — M79.89 SOFT TISSUE MASS: Status: ACTIVE | Noted: 2023-04-02

## 2023-04-02 PROBLEM — R11.0 NAUSEA IN ADULT: Status: ACTIVE | Noted: 2023-04-02

## 2023-04-02 PROBLEM — R73.9 HYPERGLYCEMIA: Status: ACTIVE | Noted: 2023-04-02

## 2023-04-02 PROBLEM — R68.89 FLU-LIKE SYMPTOMS: Status: ACTIVE | Noted: 2023-04-02

## 2023-04-02 PROBLEM — M06.329: Status: ACTIVE | Noted: 2023-04-02

## 2023-04-02 PROBLEM — R10.2 PELVIC PRESSURE IN FEMALE: Status: ACTIVE | Noted: 2023-04-02
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
## 2023-04-02 PROBLEM — E55.9 VITAMIN D DEFICIENCY: Status: ACTIVE | Noted: 2023-04-02

## 2023-04-02 PROBLEM — N39.0 UTI (URINARY TRACT INFECTION): Status: ACTIVE | Noted: 2023-04-02

## 2023-04-02 PROBLEM — R20.0 NUMBNESS IN FEET: Status: ACTIVE | Noted: 2023-04-02

## 2023-04-02 PROBLEM — G43.111 INTRACTABLE MIGRAINE WITH AURA WITH STATUS MIGRAINOSUS: Status: ACTIVE | Noted: 2023-04-02

## 2023-04-02 PROBLEM — R39.9 UTI SYMPTOMS: Status: ACTIVE | Noted: 2023-04-02

## 2023-04-02 PROBLEM — N60.19 FIBROCYSTIC BREAST DISEASE (FCBD): Status: ACTIVE | Noted: 2023-04-02

## 2023-04-02 PROBLEM — R51.9 DAILY HEADACHE: Status: ACTIVE | Noted: 2023-04-02

## 2023-04-02 PROBLEM — R53.83 FATIGUE: Status: ACTIVE | Noted: 2023-04-02

## 2023-04-02 PROBLEM — R20.0 BILATERAL HAND NUMBNESS: Status: ACTIVE | Noted: 2023-04-02

## 2023-04-02 PROBLEM — J06.9 URI (UPPER RESPIRATORY INFECTION): Status: ACTIVE | Noted: 2023-04-02

## 2023-04-02 PROBLEM — M17.12 ARTHRITIS OF KNEE, LEFT: Status: ACTIVE | Noted: 2023-04-02

## 2023-04-02 PROBLEM — M41.9 SCOLIOSIS: Status: ACTIVE | Noted: 2023-04-02

## 2023-04-02 PROBLEM — R59.1 LYMPHADENOPATHY: Status: ACTIVE | Noted: 2023-04-02

## 2023-04-02 PROBLEM — E66.9 OBESITY: Status: ACTIVE | Noted: 2023-04-02

## 2023-04-02 PROBLEM — D17.1 LIPOMA OF TORSO: Status: ACTIVE | Noted: 2023-04-02

## 2023-04-02 PROBLEM — J32.9 SINUSITIS: Status: ACTIVE | Noted: 2023-04-02

## 2023-04-02 PROBLEM — K21.9 GERD (GASTROESOPHAGEAL REFLUX DISEASE): Status: ACTIVE | Noted: 2023-04-02

## 2023-04-02 PROBLEM — J02.9 SORE THROAT: Status: ACTIVE | Noted: 2023-04-02

## 2023-04-02 PROBLEM — J03.90 TONSILLITIS WITH EXUDATE: Status: ACTIVE | Noted: 2023-04-02

## 2023-04-02 PROBLEM — J30.9 ALLERGIC RHINITIS: Status: ACTIVE | Noted: 2023-04-02

## 2023-04-02 PROBLEM — R09.89 CHEST CONGESTION: Status: ACTIVE | Noted: 2023-04-02

## 2023-04-02 PROBLEM — L29.9 PRURITUS: Status: ACTIVE | Noted: 2023-04-02

## 2023-04-02 PROBLEM — F32.A DEPRESSION: Status: ACTIVE | Noted: 2023-04-02

## 2023-04-02 PROBLEM — N91.5 OLIGOMENORRHEA: Status: ACTIVE | Noted: 2023-04-02

## 2023-04-02 PROBLEM — M54.9 BACK PAIN: Status: ACTIVE | Noted: 2023-04-02

## 2023-04-02 PROBLEM — S39.93XA: Status: ACTIVE | Noted: 2023-04-02

## 2023-04-02 PROBLEM — R30.0 DYSURIA: Status: ACTIVE | Noted: 2023-04-02

## 2023-04-02 PROBLEM — G43.109 MIGRAINE WITH AURA AND WITHOUT STATUS MIGRAINOSUS, NOT INTRACTABLE: Status: ACTIVE | Noted: 2023-04-02

## 2023-04-02 PROBLEM — J40 BRONCHITIS: Status: ACTIVE | Noted: 2023-04-02

## 2023-04-02 PROBLEM — M25.562 LEFT KNEE PAIN: Status: ACTIVE | Noted: 2023-04-02

## 2023-04-02 PROBLEM — R05.9 COUGH: Status: ACTIVE | Noted: 2023-04-02

## 2023-04-02 PROBLEM — R10.11 INTERMITTENT RIGHT UPPER QUADRANT ABDOMINAL PAIN: Status: ACTIVE | Noted: 2023-04-02

## 2023-04-02 RX ORDER — PANTOPRAZOLE SODIUM 40 MG/1
40 TABLET, DELAYED RELEASE ORAL DAILY
COMMUNITY
End: 2024-04-16 | Stop reason: SDUPTHER

## 2023-04-02 RX ORDER — SERTRALINE HYDROCHLORIDE 100 MG/1
TABLET, FILM COATED ORAL
COMMUNITY
End: 2023-04-03 | Stop reason: SDUPTHER

## 2023-04-03 ENCOUNTER — OFFICE VISIT (OUTPATIENT)
Dept: PRIMARY CARE | Facility: CLINIC | Age: 33
End: 2023-04-03
Payer: COMMERCIAL

## 2023-04-03 VITALS
BODY MASS INDEX: 34.04 KG/M2 | HEIGHT: 62 IN | DIASTOLIC BLOOD PRESSURE: 62 MMHG | HEART RATE: 83 BPM | SYSTOLIC BLOOD PRESSURE: 94 MMHG | WEIGHT: 185 LBS | OXYGEN SATURATION: 99 %

## 2023-04-03 DIAGNOSIS — R53.82 CHRONIC FATIGUE: ICD-10-CM

## 2023-04-03 DIAGNOSIS — R11.0 NAUSEA IN ADULT: ICD-10-CM

## 2023-04-03 DIAGNOSIS — E16.2 HYPOGLYCEMIA: ICD-10-CM

## 2023-04-03 DIAGNOSIS — Z00.00 ANNUAL PHYSICAL EXAM: Primary | ICD-10-CM

## 2023-04-03 DIAGNOSIS — F32.9 MAJOR DEPRESSIVE DISORDER, REMISSION STATUS UNSPECIFIED, UNSPECIFIED WHETHER RECURRENT: ICD-10-CM

## 2023-04-03 PROCEDURE — 99213 OFFICE O/P EST LOW 20 MIN: CPT | Performed by: FAMILY MEDICINE

## 2023-04-03 RX ORDER — SERTRALINE HYDROCHLORIDE 100 MG/1
100 TABLET, FILM COATED ORAL DAILY
Qty: 90 TABLET | Refills: 3 | Status: SHIPPED | OUTPATIENT
Start: 2023-04-03

## 2023-04-03 ASSESSMENT — ENCOUNTER SYMPTOMS
EYES NEGATIVE: 1
DIAPHORESIS: 1
FATIGUE: 1
TREMORS: 1
ENDOCRINE NEGATIVE: 1
RESPIRATORY NEGATIVE: 1
HEMATOLOGIC/LYMPHATIC NEGATIVE: 1
HEADACHES: 1
NAUSEA: 1
MUSCULOSKELETAL NEGATIVE: 1
PSYCHIATRIC NEGATIVE: 1
ALLERGIC/IMMUNOLOGIC NEGATIVE: 1
CARDIOVASCULAR NEGATIVE: 1

## 2023-04-03 ASSESSMENT — PATIENT HEALTH QUESTIONNAIRE - PHQ9
1. LITTLE INTEREST OR PLEASURE IN DOING THINGS: NOT AT ALL
2. FEELING DOWN, DEPRESSED OR HOPELESS: NOT AT ALL
SUM OF ALL RESPONSES TO PHQ9 QUESTIONS 1 AND 2: 0

## 2023-04-03 NOTE — PATIENT INSTRUCTIONS
-Continue current medications and therapy for chronic medical conditions.     -obtain fasting labs

## 2023-04-03 NOTE — PROGRESS NOTES
"Subjective   Patient ID: Violette Dee is a 33 y.o. female who presents for Annual Exam and Hypoglycemia.    Annual physical   Eats a generally healthy diet   Exercises   Denies any chest pain,SOB  No Abdominal pain   No black or bloody stools   Urination/BM normal     Patient does complain of episodes of feeling like her glucose is dropping. States she will feel nauseous, shaky, lightheadedness, dizziness, sweating. Occurring 1-2 times weekly. States when she checks her glucose it is normal range. States she will eat something with a little bit of sugar and will be fine after.          Review of Systems   Constitutional:  Positive for diaphoresis and fatigue.   HENT: Negative.     Eyes: Negative.    Respiratory: Negative.     Cardiovascular: Negative.    Gastrointestinal:  Positive for nausea.   Endocrine: Negative.    Genitourinary: Negative.    Musculoskeletal: Negative.    Skin: Negative.    Allergic/Immunologic: Negative.    Neurological:  Positive for tremors and headaches.   Hematological: Negative.    Psychiatric/Behavioral: Negative.         Objective   BP 94/62 (BP Location: Right arm, Patient Position: Sitting, BP Cuff Size: Adult)   Pulse 83   Ht 1.575 m (5' 2\")   Wt 83.9 kg (185 lb)   LMP  (LMP Unknown)   SpO2 99%   Breastfeeding Yes   BMI 33.84 kg/m²     Physical Exam CONSTITUTIONAL- NAD, Pt is A & O x3, Vital signs reviewed per chart.  General Appearance- normal , good hygiene,talks easily  EYES-PERRLA conjunctiva and lids- normal  EARS/NOSE-TM's normal, head normocephalic and atraumatic, naso pharynx-no nasal discharge, no trismus,  oropharynx- normal without exudate  NECK- supple, FROM, without mass  thyroid- NT, normal size, no nodule noted  LYMPH- WNL  CV- RRR without murmur, gallop, or other abnormality.  PULM- CTA bilaterally  Respiratory effort- normal respiratory effort , no retractions or nasal flaring  ABDOMEN- normoactive BS's , soft , NT, no hepatosplenomegaly palpated, or " masses. No pulsatile masses noted  extremities no edema,NT  SKIN- no abnormal skin lesions on inspection or palpation  neuro- no focal deficits  PSYCH- pleasant, normal judgement and insight     Assessment/Plan   Problem List Items Addressed This Visit       Depression    Relevant Medications    sertraline (Zoloft) 100 mg tablet    Fatigue    Relevant Orders    Comprehensive Metabolic Panel    Hemoglobin A1C    C-Peptide    Nausea in adult    Relevant Orders    Comprehensive Metabolic Panel    Hemoglobin A1C    C-Peptide     Other Visit Diagnoses       Annual physical exam    -  Primary    Hypoglycemia        Relevant Orders    Comprehensive Metabolic Panel    Hemoglobin A1C    C-Peptide             Scribe Attestation  By signing my name below, I, Gaston Montenegro DO  , Scribe   attest that this documentation has been prepared under the direction and in the presence of Gaston Montenegro DO.

## 2023-04-04 ENCOUNTER — LAB (OUTPATIENT)
Dept: LAB | Facility: LAB | Age: 33
End: 2023-04-04
Payer: COMMERCIAL

## 2023-04-04 DIAGNOSIS — E16.2 HYPOGLYCEMIA: ICD-10-CM

## 2023-04-04 DIAGNOSIS — R11.0 NAUSEA IN ADULT: ICD-10-CM

## 2023-04-04 DIAGNOSIS — R53.82 CHRONIC FATIGUE: ICD-10-CM

## 2023-04-04 LAB
ALANINE AMINOTRANSFERASE (SGPT) (U/L) IN SER/PLAS: 19 U/L (ref 7–45)
ALBUMIN (G/DL) IN SER/PLAS: 4.5 G/DL (ref 3.4–5)
ALKALINE PHOSPHATASE (U/L) IN SER/PLAS: 113 U/L (ref 33–110)
ANION GAP IN SER/PLAS: 13 MMOL/L (ref 10–20)
ASPARTATE AMINOTRANSFERASE (SGOT) (U/L) IN SER/PLAS: 21 U/L (ref 9–39)
BILIRUBIN TOTAL (MG/DL) IN SER/PLAS: 0.5 MG/DL (ref 0–1.2)
C PEPTIDE (NG/ML) IN SER/PLAS: 1.5 NG/ML (ref 0.7–3.9)
CALCIUM (MG/DL) IN SER/PLAS: 9.4 MG/DL (ref 8.6–10.3)
CARBON DIOXIDE, TOTAL (MMOL/L) IN SER/PLAS: 29 MMOL/L (ref 21–32)
CHLORIDE (MMOL/L) IN SER/PLAS: 102 MMOL/L (ref 98–107)
CREATININE (MG/DL) IN SER/PLAS: 0.67 MG/DL (ref 0.5–1.05)
ESTIMATED AVERAGE GLUCOSE FOR HBA1C: 97 MG/DL
GFR FEMALE: >90 ML/MIN/1.73M2
GLUCOSE (MG/DL) IN SER/PLAS: 77 MG/DL (ref 74–99)
HEMOGLOBIN A1C/HEMOGLOBIN TOTAL IN BLOOD: 5 %
POTASSIUM (MMOL/L) IN SER/PLAS: 4.2 MMOL/L (ref 3.5–5.3)
PROTEIN TOTAL: 7.2 G/DL (ref 6.4–8.2)
SODIUM (MMOL/L) IN SER/PLAS: 140 MMOL/L (ref 136–145)
UREA NITROGEN (MG/DL) IN SER/PLAS: 17 MG/DL (ref 6–23)

## 2023-04-04 PROCEDURE — 83036 HEMOGLOBIN GLYCOSYLATED A1C: CPT

## 2023-04-04 PROCEDURE — 36415 COLL VENOUS BLD VENIPUNCTURE: CPT

## 2023-04-04 PROCEDURE — 80053 COMPREHEN METABOLIC PANEL: CPT

## 2023-04-04 PROCEDURE — 84681 ASSAY OF C-PEPTIDE: CPT

## 2023-05-19 ENCOUNTER — OFFICE VISIT (OUTPATIENT)
Dept: PRIMARY CARE | Facility: CLINIC | Age: 33
End: 2023-05-19
Payer: COMMERCIAL

## 2023-05-19 ENCOUNTER — APPOINTMENT (OUTPATIENT)
Dept: PRIMARY CARE | Facility: CLINIC | Age: 33
End: 2023-05-19
Payer: COMMERCIAL

## 2023-05-19 VITALS
SYSTOLIC BLOOD PRESSURE: 110 MMHG | WEIGHT: 189 LBS | OXYGEN SATURATION: 99 % | HEART RATE: 69 BPM | HEIGHT: 62 IN | BODY MASS INDEX: 34.78 KG/M2 | TEMPERATURE: 97.4 F | RESPIRATION RATE: 16 BRPM | DIASTOLIC BLOOD PRESSURE: 70 MMHG

## 2023-05-19 DIAGNOSIS — R39.15 URGENCY OF URINATION: ICD-10-CM

## 2023-05-19 DIAGNOSIS — R39.9 SYMPTOMS OF URINARY TRACT INFECTION: Primary | ICD-10-CM

## 2023-05-19 DIAGNOSIS — R30.0 BURNING WITH URINATION: ICD-10-CM

## 2023-05-19 LAB
APPEARANCE, URINE: CLEAR
BILIRUBIN, URINE: NEGATIVE
BLOOD, URINE: NEGATIVE
COLOR, URINE: YELLOW
GLUCOSE, URINE: NEGATIVE MG/DL
KETONES, URINE: NEGATIVE MG/DL
LEUKOCYTE ESTERASE, URINE: NEGATIVE
NITRITE, URINE: NEGATIVE
PH, URINE: 7 (ref 5–8)
POC APPEARANCE, URINE: CLEAR
POC BILIRUBIN, URINE: NEGATIVE
POC BLOOD, URINE: ABNORMAL
POC COLOR, URINE: YELLOW
POC GLUCOSE, URINE: NEGATIVE MG/DL
POC KETONES, URINE: NEGATIVE MG/DL
POC LEUKOCYTES, URINE: NEGATIVE
POC NITRITE,URINE: NEGATIVE
POC PH, URINE: 7 PH
POC PROTEIN, URINE: NEGATIVE MG/DL
POC SPECIFIC GRAVITY, URINE: 1.02
POC UROBILINOGEN, URINE: 1 EU/DL
PROTEIN, URINE: NEGATIVE MG/DL
SPECIFIC GRAVITY, URINE: 1.01 (ref 1–1.03)
UROBILINOGEN, URINE: <2 MG/DL (ref 0–1.9)

## 2023-05-19 PROCEDURE — 81002 URINALYSIS NONAUTO W/O SCOPE: CPT | Performed by: NURSE PRACTITIONER

## 2023-05-19 PROCEDURE — 4004F PT TOBACCO SCREEN RCVD TLK: CPT | Performed by: NURSE PRACTITIONER

## 2023-05-19 PROCEDURE — 81003 URINALYSIS AUTO W/O SCOPE: CPT

## 2023-05-19 PROCEDURE — 99213 OFFICE O/P EST LOW 20 MIN: CPT | Performed by: NURSE PRACTITIONER

## 2023-05-19 RX ORDER — CEPHALEXIN 500 MG/1
500 CAPSULE ORAL 2 TIMES DAILY
Qty: 20 CAPSULE | Refills: 0 | Status: SHIPPED | OUTPATIENT
Start: 2023-05-19 | End: 2023-05-29

## 2023-05-19 RX ORDER — SUMATRIPTAN 50 MG/1
50 TABLET, FILM COATED ORAL
COMMUNITY
Start: 2023-05-15

## 2023-05-19 RX ORDER — PANTOPRAZOLE SODIUM 40 MG/1
40 TABLET, DELAYED RELEASE ORAL DAILY
COMMUNITY
Start: 2022-11-15 | End: 2024-01-02 | Stop reason: SDUPTHER

## 2023-05-19 RX ORDER — SODIUM FLUORIDE 5 MG/G
CREAM DENTAL
COMMUNITY
Start: 2023-04-11

## 2023-05-19 NOTE — PROGRESS NOTES
"Subjective   Patient ID: Violette Dee is a 33 y.o. female who presents for UTI (Patient is in office with urine urgency , itchy irritated, dark urine, and smelly. Patient has the symptoms for 1 week but the flank pain began yesterday. Patient did not take otc meds.).    HPI     Review of Systems    Objective   /70 (BP Location: Right arm, Patient Position: Sitting, BP Cuff Size: Adult)   Pulse 69   Temp 36.3 °C (97.4 °F)   Resp 16   Ht 1.575 m (5' 2\")   Wt 85.7 kg (189 lb)   SpO2 99%   BMI 34.57 kg/m²     Physical Exam    Assessment/Plan          "

## 2023-05-19 NOTE — PROGRESS NOTES
Subjective   Patient ID: Violette Dee is a 33 y.o. female who is with complaint of symptoms of UTI.    HPI  Patient is a 33 y.o. female who CONSULTED AT [facility] today. Patient is with complaints of burning sensation on urination, increased urinary frequency, urgency of urination, sensation of inadequate emptying post voiding, lower abdominal discomfort (dysuria), nocturia, low back pain, cloudy urine, and nausea. She denies having flank pain, incontinence, blood in urine, vomiting, chills, nor fever. Patient states symptoms has been going on for 7 days. Patient has not taken any medication for relief of symptoms.     Review of Systems  General: no weight loss, generally healthy, no fatigue  Head:  no headaches / sinus pain, no vertigo, no injury  Eyes: no diplopia, no tearing, no pain,   Ears: no change in hearing, no tinnitus, no bleeding, no vertigo  Mouth:  no dental difficulties, no gingival bleeding, no sore throat, no loss of sense of taste  Nose: no congestion, no  discharge, no bleeding, no obstruction, no loss of sense of smell  Neck: no stiffness, no pain, no tenderness, no masses, no bruit  Pulmonary: no dyspnea, no wheezing, no hemoptysis, no cough  Cardiovascular: no chest pain, no palpitations, no syncope, no orthopnea  Gastrointestinal: no change in appetite, no dysphagia, no abdominal pains, no diarrhea, (+) slight nausea, no emesis, no melena  Genito Urinary: (+) burning sensation on urination, (+) increased urinary frequency, (+) urgency of urination, (+) sensation of inadequate emptying post voiding, (+) lower abdominal discomfort (dysuria), (+) nocturia, (+) low back pain, (+) cloudy urine, no flank pain, no incontinence, no blood in urine,  Musculoskeletal: no muscle ache, no joint pain, no limitation of range of motion, no paresthesia, no numbness  Constitutional: no fever, no chills, no night sweats    Objective   Physical Exam  General: ambulatory, in no acute  distress  Head: normocephalic, no lesions  Abdomen: flat, NABS, soft, no direct tenderness, no rebound tenderness, no mass palpated, SIGNS: no Union, no Rovsings, no Psoas, no Obturator; No CVA tenderness,  Musculoskeletal: no limitation of range of motion, no paralysis, no deformity  Extremities: full and equal peripheral pulses, no edema,    Assessment/Plan   Problem List Items Addressed This Visit    None  Visit Diagnoses       Symptoms of urinary tract infection    -  Primary    Relevant Medications    cephalexin (Keflex) 500 mg capsule    Urgency of urination        Relevant Medications    cephalexin (Keflex) 500 mg capsule    Burning with urination        Relevant Medications    cephalexin (Keflex) 500 mg capsule        DISCHARGE SUMMARY:   Patient seen and examined. Urinalysis was done at the office today. Urinalysis result explained and discussed with patient. Urine sample submitted to laboratory for culture and sensitivity study. The laboratory examination requested were explained and discussed with patient. Probable diagnosis, differential diagnosis, treatment, treatment options, and probable complications were discussed and explained to patient. she was to take medication/s associated with this visit. she may take over-the-counter pain and/or fever medication if needed. Advised increased oral fluid intake (2 liters of water or more per day). Reinforced to continue personal hygiene. Patient to return to clinic if there is worsening or persistence of symptoms. Patient verbalized understanding.    Patient to come back in 7 - 10 days if needed for worsening symptoms.

## 2023-11-06 ENCOUNTER — PROCEDURE VISIT (OUTPATIENT)
Dept: NEUROLOGY | Facility: CLINIC | Age: 33
End: 2023-11-06
Payer: COMMERCIAL

## 2023-11-06 DIAGNOSIS — G43.711 CHRONIC MIGRAINE WITHOUT AURA, WITH INTRACTABLE MIGRAINE, SO STATED, WITH STATUS MIGRAINOSUS: Primary | ICD-10-CM

## 2023-11-06 PROCEDURE — 99214 OFFICE O/P EST MOD 30 MIN: CPT | Performed by: STUDENT IN AN ORGANIZED HEALTH CARE EDUCATION/TRAINING PROGRAM

## 2023-11-06 PROCEDURE — 64615 CHEMODENERV MUSC MIGRAINE: CPT | Performed by: STUDENT IN AN ORGANIZED HEALTH CARE EDUCATION/TRAINING PROGRAM

## 2023-11-06 ASSESSMENT — PATIENT HEALTH QUESTIONNAIRE - PHQ9
SUM OF ALL RESPONSES TO PHQ9 QUESTIONS 1 AND 2: 0
1. LITTLE INTEREST OR PLEASURE IN DOING THINGS: NOT AT ALL
2. FEELING DOWN, DEPRESSED OR HOPELESS: NOT AT ALL

## 2023-11-06 ASSESSMENT — ENCOUNTER SYMPTOMS
DEPRESSION: 0
LOSS OF SENSATION IN FEET: 0
OCCASIONAL FEELINGS OF UNSTEADINESS: 0

## 2023-11-06 NOTE — PROGRESS NOTES
Subjective   Violette Dee is an 33 y.o. female here for medical botulinum injection for Chronic migraine without aura, with intractable migraine, so stated, with status migrainosus [G43.711].     Headaches are worse since last visit, having 30/30 HA days per month. Severity continues to be >50% improved with Botox, but patient suspects that life stressors are contributing to reemergence of headaches. No longer breast feeding and has IUD in place. Sumatriptan causing burning sensation in nose and chest tightness. Unclear if sumatriptan is effective.    A/P:  Patient with chronic migraine w/o aura. Headaches are improved by 50% in severity with Botox, but having 30/30 HA days per month. Has been trialed on multiple therapeutics in the past. Will aim to start atogepant to be used in conjunction with Botox for better migraine prevention.    Plan:  - discontinue sumatriptan  - continue Botox PREEMPT  - start Atogepant 30mg daily  _______________________________________________________    Neurology Botulinum Injection    Head/Face/Jaw Botulinum Injection    Date/Time: 11/6/2023 3:44 PM    Performed by: Jeremi Blankenship DO  Authorized by: Jeremi Blankenship DO      Consent:      Consent obtained:  Written     Consent given by:  Patient    Procedure details:      Toxin (Brand):  OnaBoNT-A (Botox)       Total units injected:  0     Total units wasted:  0    Comments: Procedure Note: PREEMPT Botox    Indications: Chronic Migraine    Informed consent was obtained (explaining the procedure and risks and benefits of procedure) from patient: the signed consent form was placed in the medical record.  A time out was completed, verifying correct patient, procedure,site, positioning, and implants or special equipment.    200 units of OnabotulinumtoxinA were reconstituted with saline. Sites of injection were identified via PREEMPT protocol and cleaned with alcohol pads. Using a 30 gauge needle, patient received following  injections:    5 units in procerus  5 units in each left and right   10 units divided between 2 sites of L frontalis  10 units divided between 2 sites of R frontalis  20 units divided between 4 sites of L temporalis  20 units divided between 4 sites of R temporalis  15 units divided between 3 sites of L occipitalis  15 units divided between 3 sites of R occipitalis  10 units divided between 2 sites of L paracervical muscles  10 units divided between 2 sites of R paracervical muscles  15 units divided between 3 sites of L trapezius  15 units divided between 3 sites of R trapezius    Additional Sites:  10 units divided between 2 sites of L temporalis  10 units divided between 2 sites of R temporalis    Used: 175u  Wasted: 25u    There were no complications. Patient was comfortable and left without complaint.     OnabotulinumtoxinA  Lot #: [id number]  Exp: 2/2026

## 2023-11-08 ENCOUNTER — SPECIALTY PHARMACY (OUTPATIENT)
Dept: PHARMACY | Facility: CLINIC | Age: 33
End: 2023-11-08

## 2023-11-14 ENCOUNTER — TELEPHONE (OUTPATIENT)
Dept: PRIMARY CARE | Facility: CLINIC | Age: 33
End: 2023-11-14

## 2023-11-14 NOTE — TELEPHONE ENCOUNTER
Pt is requesting rx for Vit D. 2000.     Pt has OV scheduled for 11/29/2023 but would like to get back on Vit D ASAP

## 2023-11-29 ENCOUNTER — ANCILLARY PROCEDURE (OUTPATIENT)
Dept: RADIOLOGY | Facility: CLINIC | Age: 33
End: 2023-11-29
Payer: COMMERCIAL

## 2023-11-29 ENCOUNTER — OFFICE VISIT (OUTPATIENT)
Dept: PRIMARY CARE | Facility: CLINIC | Age: 33
End: 2023-11-29
Payer: COMMERCIAL

## 2023-11-29 VITALS
DIASTOLIC BLOOD PRESSURE: 70 MMHG | WEIGHT: 199 LBS | HEIGHT: 62 IN | SYSTOLIC BLOOD PRESSURE: 102 MMHG | OXYGEN SATURATION: 98 % | BODY MASS INDEX: 36.62 KG/M2 | HEART RATE: 76 BPM

## 2023-11-29 DIAGNOSIS — M79.671 RIGHT FOOT PAIN: ICD-10-CM

## 2023-11-29 DIAGNOSIS — D17.1 LIPOMA OF TORSO: ICD-10-CM

## 2023-11-29 DIAGNOSIS — R73.9 HYPERGLYCEMIA: ICD-10-CM

## 2023-11-29 DIAGNOSIS — R63.5 WEIGHT GAIN: ICD-10-CM

## 2023-11-29 DIAGNOSIS — M06.329: Primary | ICD-10-CM

## 2023-11-29 DIAGNOSIS — M41.9 SCOLIOSIS, UNSPECIFIED SCOLIOSIS TYPE, UNSPECIFIED SPINAL REGION: ICD-10-CM

## 2023-11-29 PROCEDURE — 73630 X-RAY EXAM OF FOOT: CPT | Mod: RIGHT SIDE | Performed by: RADIOLOGY

## 2023-11-29 PROCEDURE — 73630 X-RAY EXAM OF FOOT: CPT | Mod: RT

## 2023-11-29 PROCEDURE — 99214 OFFICE O/P EST MOD 30 MIN: CPT | Performed by: FAMILY MEDICINE

## 2023-11-29 RX ORDER — PHENTERMINE HYDROCHLORIDE 37.5 MG/1
37.5 TABLET ORAL
Qty: 30 TABLET | Refills: 0 | Status: SHIPPED | OUTPATIENT
Start: 2023-11-29 | End: 2024-01-02 | Stop reason: SDUPTHER

## 2023-11-29 RX ORDER — METFORMIN HYDROCHLORIDE 500 MG/1
TABLET ORAL
COMMUNITY
Start: 2023-11-07 | End: 2024-03-19 | Stop reason: WASHOUT

## 2023-11-29 NOTE — PROGRESS NOTES
Subjective   Patient ID: Violette Dee is a 33 y.o. female who presents for Leg Pain, Back Pain, and Weight Gain.    HPI   Pt states she would like to discuss going on something for weight loss.    Pt states she is having pain in her right leg from her hip down to the foot. Pain is constant. Pain scale is worse with activity.    Pt states she has a lump on her buttocks by her back and states she would like to discuss getting it removed.    Review of Systems  12 Systems have been reviewed as follows.  Constitutional: Fever, weight gain, weight loss, appetite change, night sweats, fatigue, chills.  Eyes : blurry, double vision, vision, loss, tearing, redness, pain, sensitivity to light, glaucoma.  Ears: nose, mouth, and throat: Hearing loss, ringing in the ears, ear pain, nasal congestion, nasal drainage, nosebleeds, mouth, throat, irritation tooth problem.  Cardiovascular: chest pain, pressure, heart racing, palpitations, sweating, leg swelling, high or low blood pressure  Pulmonary: Cough, yellow or green sputum, blood and sputum, shortness of breath, wheezing  Gastrointestinal: Nausea, vomiting, diarrhea, constipation, pain, blood in stool, or vomitus, heartburn, difficulty swallowing  Genitourinary: incontinence, abnormal bleeding, abnormal discharge, urinary frequency, urinary hesitancy, pain, impotence sexual problem, infection, urinary retention  Musculoskeletal: Pain, stiffness, joint, redness or warmth, arthritis, back pain, weakness, muscle wasting, sprain or fracture  Neuro: Weight weakness, dizziness, change in voice, change in taste change in vision, change in hearing, loss, or change of sensation, trouble walking, balance problems coordination problems, shaking, speech problem  Endocrine: cold or heat intolerance, blood sugar problem, weight gain or loss missed periods hot flashes, sweats, change in body hair, change in libido, increased thirst, increased urination  Heme/lymph: Swelling, bleeding,  "problem anemia, bruising, enlarged lymph nodes  Allergic/immunologic: H. plus nasal drip, watery itchy eyes, nasal drainage, immunosuppressed  The above were reviewed and noted negative except as noted in HPI and Problem List.    Objective   /70 (BP Location: Left arm, Patient Position: Sitting, BP Cuff Size: Adult)   Pulse 76   Ht 1.575 m (5' 2\")   Wt 90.3 kg (199 lb)   SpO2 98%   BMI 36.40 kg/m²     Physical Exam  CONSTITUTIONAL- NAD, Pt is A & O x3, Vital signs reviewed per chart.  General Appearance- normal , good hygiene,talks easily  EYES-PERRLA conjunctiva and lids- normal  EARS/NOSE-TM's normal, head normocephalic and atraumatic, naso pharynx-no nasal discharge, no trismus,  oropharynx- normal without exudate  NECK- supple, FROM, without mass  thyroid- NT, normal size, no nodule noted  LYMPH- WNL  CV- RRR without murmur, gallop, or other abnormality.  PULM- CTA bilaterally  Respiratory effort- normal respiratory effort , no retractions or nasal flaring  ABDOMEN- normoactive BS's , soft , NT, no hepatosplenomegaly palpated, or masses. No pulsatile masses noted  extremities no edema,NT  SKIN- no abnormal skin lesions on inspection or palpation  neuro- no focal deficits  PSYCH- pleasant, normal judgement and insight    Assessment/Plan   Problem List Items Addressed This Visit             ICD-10-CM    Hyperglycemia R73.9    Lipoma of torso D17.1    Rheumatoid nodule of upper arm (CMS/HCC) - Primary M06.329    Scoliosis M41.9     Other Visit Diagnoses         Codes    Weight gain     R63.5          Scribe Attestation  By signing my name below, I, RAY Lopez Scribe   attest that this documentation has been prepared under the direction and in the presence of Gaston Montenegro DO.    Provider Attestation - Scribe documentation    All medical record entries made by the Scribe were at my direction and personally dictated by me. I have reviewed the chart and agree that the record accurately reflects " my personal performance of the history, physical exam, discussion and plan.

## 2023-11-30 ENCOUNTER — PHARMACY VISIT (OUTPATIENT)
Dept: PHARMACY | Facility: CLINIC | Age: 33
End: 2023-11-30
Payer: MEDICAID

## 2023-12-01 ENCOUNTER — E-VISIT (OUTPATIENT)
Dept: PRIMARY CARE | Facility: CLINIC | Age: 33
End: 2023-12-01
Payer: COMMERCIAL

## 2023-12-01 DIAGNOSIS — S93.431S SYNDESMOTIC DISRUPTION OF RIGHT ANKLE, SEQUELA: ICD-10-CM

## 2023-12-11 ENCOUNTER — ANCILLARY PROCEDURE (OUTPATIENT)
Dept: RADIOLOGY | Facility: CLINIC | Age: 33
End: 2023-12-11
Payer: COMMERCIAL

## 2023-12-11 ENCOUNTER — OFFICE VISIT (OUTPATIENT)
Dept: ORTHOPEDIC SURGERY | Facility: CLINIC | Age: 33
End: 2023-12-11
Payer: COMMERCIAL

## 2023-12-11 VITALS — HEIGHT: 62 IN | WEIGHT: 190 LBS | BODY MASS INDEX: 34.96 KG/M2

## 2023-12-11 DIAGNOSIS — Z96.9 RETAINED ORTHOPEDIC HARDWARE: Primary | ICD-10-CM

## 2023-12-11 DIAGNOSIS — S93.431S SYNDESMOTIC DISRUPTION OF RIGHT ANKLE, SEQUELA: ICD-10-CM

## 2023-12-11 PROCEDURE — 73610 X-RAY EXAM OF ANKLE: CPT | Mod: RT

## 2023-12-11 PROCEDURE — 73610 X-RAY EXAM OF ANKLE: CPT | Mod: RIGHT SIDE | Performed by: STUDENT IN AN ORGANIZED HEALTH CARE EDUCATION/TRAINING PROGRAM

## 2023-12-11 PROCEDURE — 4004F PT TOBACCO SCREEN RCVD TLK: CPT | Performed by: STUDENT IN AN ORGANIZED HEALTH CARE EDUCATION/TRAINING PROGRAM

## 2023-12-11 PROCEDURE — 99214 OFFICE O/P EST MOD 30 MIN: CPT | Mod: 57 | Performed by: STUDENT IN AN ORGANIZED HEALTH CARE EDUCATION/TRAINING PROGRAM

## 2023-12-11 PROCEDURE — 99204 OFFICE O/P NEW MOD 45 MIN: CPT | Performed by: STUDENT IN AN ORGANIZED HEALTH CARE EDUCATION/TRAINING PROGRAM

## 2023-12-11 RX ORDER — HYDROCODONE BITARTRATE AND ACETAMINOPHEN 5; 325 MG/1; MG/1
1 TABLET ORAL EVERY 8 HOURS PRN
Qty: 15 TABLET | Refills: 0 | Status: SHIPPED | OUTPATIENT
Start: 2023-12-11 | End: 2023-12-16

## 2023-12-11 ASSESSMENT — PAIN - FUNCTIONAL ASSESSMENT: PAIN_FUNCTIONAL_ASSESSMENT: 0-10

## 2023-12-11 ASSESSMENT — PATIENT HEALTH QUESTIONNAIRE - PHQ9
SUM OF ALL RESPONSES TO PHQ9 QUESTIONS 1 AND 2: 0
2. FEELING DOWN, DEPRESSED OR HOPELESS: NOT AT ALL
1. LITTLE INTEREST OR PLEASURE IN DOING THINGS: NOT AT ALL

## 2023-12-11 ASSESSMENT — PAIN SCALES - GENERAL: PAINLEVEL_OUTOF10: 8

## 2023-12-11 NOTE — PROGRESS NOTES
Chief Complaint   Patient presents with    Right Ankle - New Patient Visit     1. Right ankle and heel pain   DOI- 6 years ago (Slipped on black ice which required Sx THROUGH [facility])   X-Rays today          HPI  33-year-old female history of prior open reduction internal fixation 6 years ago at [facility] notes today that she has been having some laterally based ankle pain anterior ankle pain as well as heel pain.  2-8 out of 10 on the course of the day.  Has not attempted any therapy stretches yet for her heel.  Denies any numbness or tingling to the affected extremity.    Past Medical History:   Diagnosis Date    Bariatric surgery status 12/12/2019    S/P gastric bypass    Bariatric surgery status 12/12/2019    H/O bariatric surgery    Other specified health status     Known health problems: none    Personal history of other diseases of the respiratory system 01/05/2023    History of upper respiratory infection       Past Surgical History:   Procedure Laterality Date    OTHER SURGICAL HISTORY  12/12/2019    Ankle surgery    OTHER SURGICAL HISTORY  12/12/2019    Gastric bypass surgery        Allergies   Allergen Reactions    Sulfa (Sulfonamide Antibiotics) Swelling        Physical exam    General: Alert and oriented to place, person, and time.  No acute distress and breathing comfortably; pleasant and cooperative with the examination.  HEENT: Head is normocephalic and atraumatic.  Neck: Supple, no visible swelling.  Cardiovascular: Good perfusion to the affected extremity.  Lungs: No audible wheezing or labored breathing.  Abdomen: Nondistended  HEME/Lymph : No visible abnormalities bilateral lower extremity    Extremity:  Gross examination right ankle: Overlying skin clean dry and intact.  Prior incision well-healed mild palpation lateral ankle mild tenderness palpation to syndesmosis.  Mild tenderness with patient plantar fascial band about the insertion point on the calcaneus.  Overlying skin is clean dry intact  neurovascular intact.  Dorsiflexion EHL plantarflexion intact with inversion eversion.  Tight gastroc anemias complex    Diagnostics:  XR ankle right 3+ views    Result Date: 12/11/2023  Interpreted By:  Colton Wyman III, STUDY: XR ANKLE RIGHT 3+ VIEWS; ;  12/11/2023 4:15 pm   INDICATION: Signs/Symptoms:pain.   COMPARISON: None.   ACCESSION NUMBER(S): [id number]   ORDERING CLINICIAN: COLTON WYMAN   FINDINGS: Three views right ankle: Status post open reduction internal fixation ankle fracture. There is broken syndesmotic fixation   Maintained joint space within the articulation. Small heel spur.       Healed ankle fracture with broken syndesmotic fixation     MACRO: None   Signed by: Colton Wyman III 12/11/2023 4:32 PM Dictation workstation:   [id number]    XR foot right 3+ views    Result Date: 11/30/2023  Interpreted By:  Komal Perez, STUDY: Right foot, 3 views.   INDICATION: Signs/Symptoms:R foot pain   COMPARISON: None.   ACCESSION NUMBER(S): [id number]   ORDERING CLINICIAN: JIMMY DU   FINDINGS: No acute fracture or malalignment. Open reduction internal fixation of distal fibula and distal tibiofibular syndesmosis with plate and screws. There is fracture of the syndesmotic fusion screw   No significant degenerative changes. Soft tissues are within normal limits. Small plantar calcaneal spur noted.       1. No significant degenerative changes or acute radiographic findings of the right foot. 2. Findings of syndesmotic fusion hardware failure with fracture of the syndesmotic fusion screw.   MACRO: None.   Signed by: Komal Perez 11/30/2023 5:18 PM Dictation workstation:   [id number]       Procedure:  Procedures    Assessment:  33-year-old female with plantar fasciitis, retained orthopedic hardware    Treatment plan:  The natural history of the condition and its associated treatment alternatives including surgical and nonsurgical options were discussed with the patient at length.  X-ray  findings discussed with patient in detail patient  Patient does have broken syndesmotic hardware fixation which I do think may be contributing to some of her pain and discomfort about the lateral ankle as well as the tib-fib joint.  Discussed potential treatment options for this to include continued conservative treatment versus open hardware removal.  We did discuss that I do think that some of this pain would be relieved however not all that necessarily.  Risk benefits alternative treatment discussed in detail using shared informed vision making patient wishes to proceed with attempted hardware removal.  Discussed that there is always a chance that I can make the problem worse and then there may be incomplete resolution of pain only or incomplete resolution and removal of hardware.  Patient is okay with this risk.      Regarding plantar fasciitis recommend gastrocnemius stretches.  Heel cord stretches.  He appears to activity as tolerated as well as supportive shoe wear.    Risk benefits and alternatives to treatment were discussed with patient in detail.  Particular risks of hardware removal include, but are not limited to, incomplete resolution of pain, partial removal of hardware, neurovascular compromise, infection, wound healing complications, need for revision surgery.  Using shared informed decision making patient does wish to proceed with hardware removal.  Discussed with patient that if there is incomplete removal of hardware and that I do not think retained remnant hardware is going to cause the patient issues we will leave this in place.  Patient demonstrates understanding and wished to proceed with surgery.  We will schedule surgery at earliest convenience.    The risks of surgery were discussed including but not limited to the risks of medications given for surgery, the risk of blood loss during and after surgery that can lead to the need for blood products in certain situations, infection, damage to  normal structures that can lead to long term problems of pain or dysfunction, wound healing complications, the possibility of nonunion/malunion of any osteotomies and late or chronic pain as a result of the surgical intervention.  In addition potentially life threatening complications that can occur at the time of surgery and after surgery were discussed including but not limited to deep vein thrombosis, pulmonary embolism, myocardial infarction, stroke and death.    All of the patient's questions were answered.

## 2023-12-26 ENCOUNTER — APPOINTMENT (OUTPATIENT)
Dept: PRIMARY CARE | Facility: CLINIC | Age: 33
End: 2023-12-26
Payer: COMMERCIAL

## 2024-01-02 ENCOUNTER — OFFICE VISIT (OUTPATIENT)
Dept: PRIMARY CARE | Facility: CLINIC | Age: 34
End: 2024-01-02
Payer: COMMERCIAL

## 2024-01-02 VITALS
SYSTOLIC BLOOD PRESSURE: 118 MMHG | WEIGHT: 192 LBS | DIASTOLIC BLOOD PRESSURE: 68 MMHG | OXYGEN SATURATION: 98 % | HEIGHT: 62 IN | BODY MASS INDEX: 35.33 KG/M2 | TEMPERATURE: 98.2 F | HEART RATE: 97 BPM

## 2024-01-02 DIAGNOSIS — R73.9 HYPERGLYCEMIA: ICD-10-CM

## 2024-01-02 DIAGNOSIS — R63.5 WEIGHT GAIN: Primary | ICD-10-CM

## 2024-01-02 PROBLEM — M06.329: Status: RESOLVED | Noted: 2023-04-02 | Resolved: 2024-01-02

## 2024-01-02 PROCEDURE — 99213 OFFICE O/P EST LOW 20 MIN: CPT | Performed by: FAMILY MEDICINE

## 2024-01-02 RX ORDER — CALCIUM CITRATE/VITAMIN D3 315MG-6.25
2 TABLET ORAL 2 TIMES DAILY
COMMUNITY
Start: 2023-12-05

## 2024-01-02 RX ORDER — PHENTERMINE HYDROCHLORIDE 37.5 MG/1
37.5 TABLET ORAL
Qty: 30 TABLET | Refills: 0 | Status: SHIPPED | OUTPATIENT
Start: 2024-01-02 | End: 2024-01-30 | Stop reason: SDUPTHER

## 2024-01-02 NOTE — PROGRESS NOTES
Subjective   Patient ID: Violette Dee is a 33 y.o. female who presents for Weight Loss.    HPI   Patient presents today for weight management follow up. Patient is currently taking Adipex for with good tolerance and good compliance. States the medication helps curve her appetite. Patient gained 2 lbs since LOV.     Denies any other concerns at this time.     Review of Systems  12 Systems have been reviewed as follows.  Constitutional: Fever, weight gain, weight loss, appetite change, night sweats, fatigue, chills.  Eyes : blurry, double vision, vision, loss, tearing, redness, pain, sensitivity to light, glaucoma.  Ears: nose, mouth, and throat: Hearing loss, ringing in the ears, ear pain, nasal congestion, nasal drainage, nosebleeds, mouth, throat, irritation tooth problem.  Cardiovascular: chest pain, pressure, heart racing, palpitations, sweating, leg swelling, high or low blood pressure  Pulmonary: Cough, yellow or green sputum, blood and sputum, shortness of breath, wheezing  Gastrointestinal: Nausea, vomiting, diarrhea, constipation, pain, blood in stool, or vomitus, heartburn, difficulty swallowing  Genitourinary: incontinence, abnormal bleeding, abnormal discharge, urinary frequency, urinary hesitancy, pain, impotence sexual problem, infection, urinary retention  Musculoskeletal: Pain, stiffness, joint, redness or warmth, arthritis, back pain, weakness, muscle wasting, sprain or fracture  Neuro: Weight weakness, dizziness, change in voice, change in taste change in vision, change in hearing, loss, or change of sensation, trouble walking, balance problems coordination problems, shaking, speech problem  Endocrine: cold or heat intolerance, blood sugar problem, weight gain or loss missed periods hot flashes, sweats, change in body hair, change in libido, increased thirst, increased urination  Heme/lymph: Swelling, bleeding, problem anemia, bruising, enlarged lymph nodes  Allergic/immunologic: H. plus nasal  "drip, watery itchy eyes, nasal drainage, immunosuppressed  The above were reviewed and noted negative except as noted in HPI and Problem List.    Objective   /68   Pulse 97   Temp 36.8 °C (98.2 °F)   Ht 1.575 m (5' 2\")   Wt 87.1 kg (192 lb)   SpO2 98%   BMI 35.12 kg/m²     Physical Exam  CONSTITUTIONAL- NAD, Pt is A & O x3, Vital signs reviewed per chart.  General Appearance- normal , good hygiene,talks easily  EYES-PERRLA conjunctiva and lids- normal  EARS/NOSE-TM's normal, head normocephalic and atraumatic, naso pharynx-no nasal discharge, no trismus,  oropharynx- normal without exudate  NECK- supple, FROM, without mass  thyroid- NT, normal size, no nodule noted  LYMPH- WNL  CV- RRR without murmur, gallop, or other abnormality.  PULM- CTA bilaterally  Respiratory effort- normal respiratory effort , no retractions or nasal flaring  ABDOMEN- normoactive BS's , soft , NT, no hepatosplenomegaly palpated, or masses. No pulsatile masses noted  extremities no edema,NT  SKIN- no abnormal skin lesions on inspection or palpation  neuro- no focal deficits  PSYCH- pleasant, normal judgement and insight    Assessment/Plan   Problem List Items Addressed This Visit             ICD-10-CM    Hyperglycemia R73.9    Relevant Medications    phentermine (Adipex-P) 37.5 mg tablet     Other Visit Diagnoses         Codes    Weight gain    -  Primary R63.5    Relevant Medications    phentermine (Adipex-P) 37.5 mg tablet          Scribe Attestation  By signing my name below, IHillary RMA, Scribe   attest that this documentation has been prepared under the direction and in the presence of Gaston Montenegro DO.    Provider Attestation - Scribe documentation    All medical record entries made by the Scribe were at my direction and personally dictated by me. I have reviewed the chart and agree that the record accurately reflects my personal performance of the history, physical exam, discussion and plan.    "

## 2024-01-09 ENCOUNTER — APPOINTMENT (OUTPATIENT)
Dept: PRIMARY CARE | Facility: CLINIC | Age: 34
End: 2024-01-09
Payer: COMMERCIAL

## 2024-01-13 ENCOUNTER — ANESTHESIA EVENT (OUTPATIENT)
Dept: OPERATING ROOM | Age: 34
End: 2024-01-13
Payer: MEDICAID

## 2024-01-17 ASSESSMENT — LIFESTYLE VARIABLES: SMOKING_STATUS: 0

## 2024-01-17 ASSESSMENT — ENCOUNTER SYMPTOMS
DYSPNEA ACTIVITY LEVEL: NO INTERVAL CHANGE
SHORTNESS OF BREATH: 1

## 2024-01-18 ENCOUNTER — ANESTHESIA (OUTPATIENT)
Dept: OPERATING ROOM | Age: 34
End: 2024-01-18
Payer: MEDICAID

## 2024-01-18 ENCOUNTER — APPOINTMENT (OUTPATIENT)
Dept: GENERAL RADIOLOGY | Age: 34
End: 2024-01-18
Attending: STUDENT IN AN ORGANIZED HEALTH CARE EDUCATION/TRAINING PROGRAM
Payer: MEDICAID

## 2024-01-18 ENCOUNTER — HOSPITAL ENCOUNTER (OUTPATIENT)
Age: 34
Setting detail: OUTPATIENT SURGERY
Discharge: HOME | End: 2024-01-18
Attending: STUDENT IN AN ORGANIZED HEALTH CARE EDUCATION/TRAINING PROGRAM | Admitting: STUDENT IN AN ORGANIZED HEALTH CARE EDUCATION/TRAINING PROGRAM
Payer: MEDICAID

## 2024-01-18 VITALS
DIASTOLIC BLOOD PRESSURE: 67 MMHG | OXYGEN SATURATION: 98 % | HEART RATE: 96 BPM | HEIGHT: 62 IN | SYSTOLIC BLOOD PRESSURE: 109 MMHG | BODY MASS INDEX: 34.23 KG/M2 | WEIGHT: 186 LBS | TEMPERATURE: 97.6 F | RESPIRATION RATE: 14 BRPM

## 2024-01-18 DIAGNOSIS — R52 PAIN: ICD-10-CM

## 2024-01-18 LAB
HCG, URINE, POC: NEGATIVE
Lab: NORMAL
NEGATIVE QC PASS/FAIL: NORMAL
POSITIVE QC PASS/FAIL: NORMAL

## 2024-01-18 PROCEDURE — 7100000010 HC PHASE II RECOVERY - FIRST 15 MIN: Performed by: STUDENT IN AN ORGANIZED HEALTH CARE EDUCATION/TRAINING PROGRAM

## 2024-01-18 PROCEDURE — 2580000003 HC RX 258: Performed by: STUDENT IN AN ORGANIZED HEALTH CARE EDUCATION/TRAINING PROGRAM

## 2024-01-18 PROCEDURE — 7100000000 HC PACU RECOVERY - FIRST 15 MIN: Performed by: STUDENT IN AN ORGANIZED HEALTH CARE EDUCATION/TRAINING PROGRAM

## 2024-01-18 PROCEDURE — 6360000002 HC RX W HCPCS: Performed by: STUDENT IN AN ORGANIZED HEALTH CARE EDUCATION/TRAINING PROGRAM

## 2024-01-18 PROCEDURE — 3700000001 HC ADD 15 MINUTES (ANESTHESIA): Performed by: STUDENT IN AN ORGANIZED HEALTH CARE EDUCATION/TRAINING PROGRAM

## 2024-01-18 PROCEDURE — 6370000000 HC RX 637 (ALT 250 FOR IP): Performed by: STUDENT IN AN ORGANIZED HEALTH CARE EDUCATION/TRAINING PROGRAM

## 2024-01-18 PROCEDURE — 7100000011 HC PHASE II RECOVERY - ADDTL 15 MIN: Performed by: STUDENT IN AN ORGANIZED HEALTH CARE EDUCATION/TRAINING PROGRAM

## 2024-01-18 PROCEDURE — A4217 STERILE WATER/SALINE, 500 ML: HCPCS | Performed by: STUDENT IN AN ORGANIZED HEALTH CARE EDUCATION/TRAINING PROGRAM

## 2024-01-18 PROCEDURE — 6360000002 HC RX W HCPCS: Performed by: NURSE ANESTHETIST, CERTIFIED REGISTERED

## 2024-01-18 PROCEDURE — 7100000001 HC PACU RECOVERY - ADDTL 15 MIN: Performed by: STUDENT IN AN ORGANIZED HEALTH CARE EDUCATION/TRAINING PROGRAM

## 2024-01-18 PROCEDURE — 3600000003 HC SURGERY LEVEL 3 BASE: Performed by: STUDENT IN AN ORGANIZED HEALTH CARE EDUCATION/TRAINING PROGRAM

## 2024-01-18 PROCEDURE — 2500000003 HC RX 250 WO HCPCS: Performed by: NURSE ANESTHETIST, CERTIFIED REGISTERED

## 2024-01-18 PROCEDURE — 20680 REMOVAL OF IMPLANT DEEP: CPT | Performed by: STUDENT IN AN ORGANIZED HEALTH CARE EDUCATION/TRAINING PROGRAM

## 2024-01-18 PROCEDURE — 2709999900 HC NON-CHARGEABLE SUPPLY: Performed by: STUDENT IN AN ORGANIZED HEALTH CARE EDUCATION/TRAINING PROGRAM

## 2024-01-18 PROCEDURE — 2500000003 HC RX 250 WO HCPCS: Performed by: STUDENT IN AN ORGANIZED HEALTH CARE EDUCATION/TRAINING PROGRAM

## 2024-01-18 PROCEDURE — 3700000000 HC ANESTHESIA ATTENDED CARE: Performed by: STUDENT IN AN ORGANIZED HEALTH CARE EDUCATION/TRAINING PROGRAM

## 2024-01-18 PROCEDURE — 3600000013 HC SURGERY LEVEL 3 ADDTL 15MIN: Performed by: STUDENT IN AN ORGANIZED HEALTH CARE EDUCATION/TRAINING PROGRAM

## 2024-01-18 RX ORDER — ONDANSETRON 2 MG/ML
INJECTION INTRAMUSCULAR; INTRAVENOUS PRN
Status: DISCONTINUED | OUTPATIENT
Start: 2024-01-18 | End: 2024-01-18 | Stop reason: SDUPTHER

## 2024-01-18 RX ORDER — BUPIVACAINE HYDROCHLORIDE AND EPINEPHRINE 2.5; 5 MG/ML; UG/ML
INJECTION, SOLUTION EPIDURAL; INFILTRATION; INTRACAUDAL; PERINEURAL PRN
Status: DISCONTINUED | OUTPATIENT
Start: 2024-01-18 | End: 2024-01-18 | Stop reason: HOSPADM

## 2024-01-18 RX ORDER — MIDAZOLAM HYDROCHLORIDE 2 MG/2ML
2 INJECTION, SOLUTION INTRAMUSCULAR; INTRAVENOUS ONCE
Status: COMPLETED | OUTPATIENT
Start: 2024-01-18 | End: 2024-01-18

## 2024-01-18 RX ORDER — PROPOFOL 10 MG/ML
INJECTION, EMULSION INTRAVENOUS PRN
Status: DISCONTINUED | OUTPATIENT
Start: 2024-01-18 | End: 2024-01-18 | Stop reason: SDUPTHER

## 2024-01-18 RX ORDER — ONDANSETRON 2 MG/ML
4 INJECTION INTRAMUSCULAR; INTRAVENOUS
Status: DISCONTINUED | OUTPATIENT
Start: 2024-01-18 | End: 2024-01-18 | Stop reason: HOSPADM

## 2024-01-18 RX ORDER — SODIUM CHLORIDE 0.9 % (FLUSH) 0.9 %
5-40 SYRINGE (ML) INJECTION PRN
Status: DISCONTINUED | OUTPATIENT
Start: 2024-01-18 | End: 2024-01-18 | Stop reason: HOSPADM

## 2024-01-18 RX ORDER — MIDAZOLAM HYDROCHLORIDE 1 MG/ML
INJECTION INTRAMUSCULAR; INTRAVENOUS PRN
Status: DISCONTINUED | OUTPATIENT
Start: 2024-01-18 | End: 2024-01-18 | Stop reason: SDUPTHER

## 2024-01-18 RX ORDER — DEXAMETHASONE SODIUM PHOSPHATE 10 MG/ML
INJECTION INTRAMUSCULAR; INTRAVENOUS PRN
Status: DISCONTINUED | OUTPATIENT
Start: 2024-01-18 | End: 2024-01-18 | Stop reason: SDUPTHER

## 2024-01-18 RX ORDER — FENTANYL CITRATE 0.05 MG/ML
25 INJECTION, SOLUTION INTRAMUSCULAR; INTRAVENOUS EVERY 5 MIN PRN
Status: DISCONTINUED | OUTPATIENT
Start: 2024-01-18 | End: 2024-01-18 | Stop reason: HOSPADM

## 2024-01-18 RX ORDER — MAGNESIUM HYDROXIDE 1200 MG/15ML
LIQUID ORAL CONTINUOUS PRN
Status: DISCONTINUED | OUTPATIENT
Start: 2024-01-18 | End: 2024-01-18 | Stop reason: HOSPADM

## 2024-01-18 RX ORDER — SODIUM CHLORIDE 9 MG/ML
INJECTION, SOLUTION INTRAVENOUS PRN
Status: DISCONTINUED | OUTPATIENT
Start: 2024-01-18 | End: 2024-01-18 | Stop reason: HOSPADM

## 2024-01-18 RX ORDER — LIDOCAINE HYDROCHLORIDE 10 MG/ML
INJECTION, SOLUTION EPIDURAL; INFILTRATION; INTRACAUDAL; PERINEURAL PRN
Status: DISCONTINUED | OUTPATIENT
Start: 2024-01-18 | End: 2024-01-18 | Stop reason: SDUPTHER

## 2024-01-18 RX ORDER — FENTANYL CITRATE 50 UG/ML
INJECTION, SOLUTION INTRAMUSCULAR; INTRAVENOUS PRN
Status: DISCONTINUED | OUTPATIENT
Start: 2024-01-18 | End: 2024-01-18 | Stop reason: SDUPTHER

## 2024-01-18 RX ORDER — SODIUM CHLORIDE, SODIUM LACTATE, POTASSIUM CHLORIDE, CALCIUM CHLORIDE 600; 310; 30; 20 MG/100ML; MG/100ML; MG/100ML; MG/100ML
INJECTION, SOLUTION INTRAVENOUS CONTINUOUS
Status: DISCONTINUED | OUTPATIENT
Start: 2024-01-18 | End: 2024-01-18 | Stop reason: HOSPADM

## 2024-01-18 RX ORDER — SODIUM CHLORIDE 0.9 % (FLUSH) 0.9 %
5-40 SYRINGE (ML) INJECTION EVERY 12 HOURS SCHEDULED
Status: DISCONTINUED | OUTPATIENT
Start: 2024-01-18 | End: 2024-01-18 | Stop reason: HOSPADM

## 2024-01-18 RX ORDER — OXYCODONE HYDROCHLORIDE 5 MG/1
5 TABLET ORAL
Status: COMPLETED | OUTPATIENT
Start: 2024-01-18 | End: 2024-01-18

## 2024-01-18 RX ADMIN — ONDANSETRON 4 MG: 2 INJECTION INTRAMUSCULAR; INTRAVENOUS at 11:09

## 2024-01-18 RX ADMIN — FENTANYL CITRATE 100 MCG: 50 INJECTION, SOLUTION INTRAMUSCULAR; INTRAVENOUS at 11:06

## 2024-01-18 RX ADMIN — PROPOFOL 200 MG: 10 INJECTION, EMULSION INTRAVENOUS at 11:06

## 2024-01-18 RX ADMIN — OXYCODONE HYDROCHLORIDE 5 MG: 5 TABLET ORAL at 13:43

## 2024-01-18 RX ADMIN — HYDROMORPHONE HYDROCHLORIDE 0.5 MG: 1 INJECTION, SOLUTION INTRAMUSCULAR; INTRAVENOUS; SUBCUTANEOUS at 12:23

## 2024-01-18 RX ADMIN — LIDOCAINE HYDROCHLORIDE 25 MG: 10 INJECTION, SOLUTION EPIDURAL; INFILTRATION; INTRACAUDAL; PERINEURAL at 11:06

## 2024-01-18 RX ADMIN — MIDAZOLAM HYDROCHLORIDE 2 MG: 1 INJECTION, SOLUTION INTRAMUSCULAR; INTRAVENOUS at 11:02

## 2024-01-18 RX ADMIN — MIDAZOLAM 2 MG: 1 INJECTION INTRAMUSCULAR; INTRAVENOUS at 12:50

## 2024-01-18 RX ADMIN — HYDROMORPHONE HYDROCHLORIDE 0.5 MG: 1 INJECTION, SOLUTION INTRAMUSCULAR; INTRAVENOUS; SUBCUTANEOUS at 12:38

## 2024-01-18 RX ADMIN — SODIUM CHLORIDE, POTASSIUM CHLORIDE, SODIUM LACTATE AND CALCIUM CHLORIDE: 600; 310; 30; 20 INJECTION, SOLUTION INTRAVENOUS at 10:38

## 2024-01-18 RX ADMIN — DEXAMETHASONE SODIUM PHOSPHATE 10 MG: 10 INJECTION INTRAMUSCULAR; INTRAVENOUS at 11:09

## 2024-01-18 RX ADMIN — SODIUM CHLORIDE, POTASSIUM CHLORIDE, SODIUM LACTATE AND CALCIUM CHLORIDE: 600; 310; 30; 20 INJECTION, SOLUTION INTRAVENOUS at 11:05

## 2024-01-18 RX ADMIN — HYDROMORPHONE HYDROCHLORIDE 0.5 MG: 1 INJECTION, SOLUTION INTRAMUSCULAR; INTRAVENOUS; SUBCUTANEOUS at 12:19

## 2024-01-18 RX ADMIN — SODIUM CHLORIDE, POTASSIUM CHLORIDE, SODIUM LACTATE AND CALCIUM CHLORIDE: 600; 310; 30; 20 INJECTION, SOLUTION INTRAVENOUS at 11:26

## 2024-01-18 RX ADMIN — CEFAZOLIN 2000 MG: 2 INJECTION, POWDER, FOR SOLUTION INTRAMUSCULAR; INTRAVENOUS at 11:10

## 2024-01-18 ASSESSMENT — PAIN DESCRIPTION - LOCATION
LOCATION: ANKLE

## 2024-01-18 ASSESSMENT — PAIN DESCRIPTION - ORIENTATION
ORIENTATION: RIGHT

## 2024-01-18 ASSESSMENT — PAIN SCALES - GENERAL
PAINLEVEL_OUTOF10: 0
PAINLEVEL_OUTOF10: 8
PAINLEVEL_OUTOF10: 6
PAINLEVEL_OUTOF10: 7
PAINLEVEL_OUTOF10: 5
PAINLEVEL_OUTOF10: 8
PAINLEVEL_OUTOF10: 7

## 2024-01-18 ASSESSMENT — PAIN DESCRIPTION - DESCRIPTORS
DESCRIPTORS: ACHING;THROBBING
DESCRIPTORS: ACHING;THROBBING

## 2024-01-18 NOTE — ANESTHESIA POSTPROCEDURE EVALUATION
Department of Anesthesiology  Postprocedure Note    Patient: Ana Rosa Vargas  MRN: [id number]  YOB: 1990  Date of evaluation: 1/18/2024    Procedure Summary     Date: 01/18/24 Room / Location: [address]    Anesthesia Start: 1104 Anesthesia Stop: 1212    Procedure: RIGHT ANKLE HARDWARE REMOVAL (Right: Ankle) Diagnosis:       Pain in bone fixation device, initial encounter ([facility])      (Pain in bone fixation device, initial encounter ([facility]) [T84.84XA])    Surgeons: Andre Chacon MD Responsible Provider: Rufina Bradshaw MD    Anesthesia Type: general ASA Status: 3          Anesthesia Type: No value filed.    Beth Phase I: Beth Score: 10    Beth Phase II:      Anesthesia Post Evaluation    Patient location during evaluation: PACU  Patient participation: complete - patient participated  Level of consciousness: awake and alert  Pain score: 0  Airway patency: patent  Nausea & Vomiting: no nausea and no vomiting  Cardiovascular status: blood pressure returned to baseline and hemodynamically stable  Respiratory status: nonlabored ventilation, spontaneous ventilation and acceptable  Hydration status: euvolemic  Pain management: adequate        No notable events documented.

## 2024-01-18 NOTE — DISCHARGE INSTRUCTIONS
Medication given may have significant effects after discharge. Therefore on the day of surgery:  1) you must be accompanied by a responsible adult upon discharge and for 24 hours after surgery.  Do not drive a motor vehicle, operate machinery, power tools or appliance, drink alcoholic beverages, or make critical decisions for 24 hours  2) Be aware of dizziness, which may cause a fall. Change positions slowly.  3) Eating: you may resume your regular diet but it is better to increase intake slowly with mild foods and working up to your regular diet. No greasy, fried or spicy foods today.  4) Nausea/Vomiting: Nausea and vomiting may occur as you become more active or begin to increase food intake. If this should happen, decrease activity and return to liquids. If the problem persists, call your surgeon  5) Pain: Your surgeon may have given you a prescription for pain medication. Take pain medication with food as prescribed. Pain medication may cause constipation, so drink plenty of fluids. If your pain medication does not provide adequate relief, call your surgeon  6) Urinating: Notify your surgeon if you have not urinated within 12 hours after discharge  7) Ice: Apply ice to operative site for 20 min 5-6 times a day or use Polar care as instructed  8) Dressing:   []   Remove dressing in 24hr    []  Remove dressing in 48hr   []  Leave open to air after initial dressing is taken off and incision is dry  Do not remove the steri-strips. (no bath/ hot tubs/ pools)   [x]  Leave dressing in place. Keep dressing/ incision clean and dry.    9) Activity    Shoulder/ elbow/Hand   []  Elevate extremity    []  Sling   [] at all times (except for exercises and showering)  [] as needed only for comfort   [] Begin daily motion exercises out of sling as instructed   []  Bend and flex fingers/ wrist/elbow frequently   [] other   Knee/ Ankle/ Foot   [] elevate extremity   [x] crutches        [x] non-weight bearing to operative

## 2024-01-18 NOTE — PROGRESS NOTES
Patient ID:  Ana Rosa Vargas  [id number]  34 y.o.  1990  BOVIE PAD SITE CLEAR AND INTACT PRE AND POST OP. TAKEN TO PACU,   ATTACHED TO MONITOR AND REPORT GIVEN TO RN.   VSS DRSG DRY AND INTACT        Electronically signed by Veronica Barros RN on 1/18/2024

## 2024-01-18 NOTE — OP NOTE
Operative Note      Patient: Ana Rosa Vargas  YOB: 1990  MRN: [id number]    Date of Procedure: 1/18/2024    Pre-Op Diagnosis Codes:     * Pain in bone fixation device, initial encounter ([facility]) [T84.84XA]    Post-Op Diagnosis: Same       Procedure(s):  RIGHT ANKLE HARDWARE REMOVAL    Surgeon(s):  Andre Chacon MD    Assistant:   First Assistant: Mahnaz Luz    Anesthesia: General    Estimated Blood Loss (mL): Minimal    Complications: None    Specimens:   * No specimens in log *    Implants:  * No implants in log *      Drains: * No LDAs found *    Findings: See op note        Detailed Description of Procedure:   History/ Surgical indication  34-year-old female with history of retained orthopedic hardware status post ankle open reduction to fixation many years prior by surgeon at [facility].  She has been having symptomatic hardware presented to my clinic with pain about her ankle hoping to get hardware removed.  Risk benefits alternative treatment discussed with patient using shared informed decision making she wishes to proceed    The patient understood that our plan was for hardware removal right ankle.  They had the opportunity to ask questions on several occasions.  We discussed the risks, benefits and alternatives to treatment. The elected to proceed with the above intervention.  Particular risks to this surgery include incomplete hardware removal, continued pain, discomfort.    OPERATIVE PROCEDURE:   I met with Ana Rosa in the preoperative area prior to the procedure and discussed the surgical plan once again and answered all  questions related to the procedure and the expected post-operative course. The risks of surgery were discussed including but not limited to the risks of medications given for surgery, the risk of blood loss during and after surgery that can lead to the need for blood products in certain situations, infection, damage to normal structures that can lead to long term problems of

## 2024-01-18 NOTE — ANESTHESIA PRE PROCEDURE
Department of Anesthesiology  Preprocedure Note       Name:  Ana Rosa Vargas   Age:  34 y.o.  :  1990                                          MRN:  [id number]         Date:  2024      Surgeon: Surgeon(s):  Andre Chacon MD    Procedure: Procedure(s):  RIGHT ANKLE HARDWARE REMOVAL. SUPINE, SYNTHES HARDWARE REMOVAL SET. c-arm Edward    Medications prior to admission:   Prior to Admission medications    Medication Sig Start Date End Date Taking? Authorizing Provider   nicotine (NICODERM CQ) 21 MG/24HR Place 1 patch onto the skin daily 1/3/23 2/14/23  Ana Rosa Graves APRN - CNM   amoxicillin (AMOXIL) 500 MG capsule  22   Travis Ely MD    MG tablet  22   Travis Ely MD   MIRENA, 52 MG, IUD 52 mg  22   Travis Ely MD   pantoprazole (PROTONIX) 40 MG tablet  11/15/22   Travis Ely MD   ONETOUCH VERIO strip USE AS INSTRUCTED 4 TIMES DAILY 10/27/22   Travis Ely MD   Continuous Blood Gluc Sensor (FREESTYLE PATRICIA 2 SENSOR) [facility]  22   Travis Ely MD   Prenatal Vit-Fe Fumarate-FA (PRENATAL 1+1 PO) Take by mouth    Travis Ely MD   Lancets 30G [facility]  22   Travis Ely MD   glucose monitoring (FREESTYLE) kit 1 each by Other route 4 times daily 22   Travis Ely MD   acetaminophen (TYLENOL) 500 MG tablet Take 500 mg by mouth every 6 hours as needed for Pain  22  Travis Ely MD   sertraline (ZOLOFT) 100 MG tablet Take 1.5 tablets by mouth daily 22   Travis Ely MD       Current medications:    Current Facility-Administered Medications   Medication Dose Route Frequency Provider Last Rate Last Admin    ceFAZolin (ANCEF) 2,000 mg in sodium chloride 0.9 % 100 mL IVPB (mini-bag)  2,000 mg IntraVENous On Call to OR Andre Chacon MD        lactated ringers IV soln infusion   IntraVENous Continuous Rufina Bradshaw MD           Allergies:    Allergies   Allergen

## 2024-01-18 NOTE — H&P
History and physical      HPI: 34-year-old female presents today for hardware removal right ankle.  Risk benefits alternatives to treatment discussed with patient in detail.  Using shared informed decision making they wish to proceed.  Patient denies any chest pain or shortness of breath.      Review of Systems:  Constitutional: NEGATIVE: Fever, Chills, Anorexia, Weight Loss, Malaise  Eyes: NEGATIVE: Blurry Vision, Drainage, Diploplia, Redness, Vision Loss/ Change  ENMT: NEGATIVE: Nasal Discharge, Nasal Congestion, Ear Pain, Mouth Pain, Throat Pain  Respiratory: NEGATIVE: Dry Cough, Productive Cough, Hemoptysis, Wheezing, Shortness of Breath  Cardiac: NEGATIVE: Chest Pain, Dyspnea on Exertion, Orthopnea, Palpitations, Syncope  Gastrointestinal: NEGATIVE: Nausea, Vomiting, Diarrhea, Constipation, Abdominal Pain  Genitourinary: NEGATIVE: Discharge, Dysuria, Flank Pain, Frequency, Hematuria  Musculoskeletal: POSITIVE: Decreased ROM, Pain, Stiffness, Weakness;   Neurological: NEGATIVE: Dizziness, Confusion, Headache, Seizures, Syncope    History reviewed. No pertinent past medical history.    Allergies   Allergen Reactions    Sulfa Antibiotics Itching         Physical  Examination:  Constitutional: Well developed, awake/alert/oriented x3, no distress, alert and cooperative  Eyes: PERRL, EOMI, clear sclera  ENMT: mucous membranes moist, no apparent injury, no lesions seen  Head/Neck: Neck supple, no apparent injury, thyroid without mass or tenderness, No JVD, trachea midline, no bruits  Respiratory/Thorax: Patent airways, CTAB, normal breath sounds with good chest expansion, thorax symmetric  Cardiovascular: Regular, rate and rhythm, mild systolic murmur, 2+ equal pulses of the extremities, normal S 1and S 2  Gastrointestinal: Nondistended, soft, non-tender, no rebound tenderness or guarding, no masses palpable, no organomegaly, +BS, no bruits  Musculoskeletal: See prior notes  Extremities: normal extremities, no

## 2024-01-18 NOTE — PROGRESS NOTES
Pt given instructions on crutch use with return demonstration provided. Pt  instructed on  splint care and she verbalized understanding along with her chiquita

## 2024-01-26 NOTE — PROGRESS NOTES
"Subjective   Patient ID: Violette Dee is a 34 y.o. female who presents for Weight Management.    HPI   Pt presents today for weight management follow up. Pt is currently taking Adipex and has lost 1 pound.     Review of Systems  12 Systems have been reviewed as follows.  Constitutional: Fever, weight gain, weight loss, appetite change, night sweats, fatigue, chills.  Eyes : blurry, double vision, vision, loss, tearing, redness, pain, sensitivity to light, glaucoma.  Ears: nose, mouth, and throat: Hearing loss, ringing in the ears, ear pain, nasal congestion, nasal drainage, nosebleeds, mouth, throat, irritation tooth problem.  Cardiovascular: chest pain, pressure, heart racing, palpitations, sweating, leg swelling, high or low blood pressure  Pulmonary: Cough, yellow or green sputum, blood and sputum, shortness of breath, wheezing  Gastrointestinal: Nausea, vomiting, diarrhea, constipation, pain, blood in stool, or vomitus, heartburn, difficulty swallowing  Musculoskeletal: Pain, stiffness, joint, redness or warmth, arthritis, back pain, weakness, muscle wasting, sprain or fracture  Neuro: Weight weakness, dizziness, change in voice, change in taste change in vision, change in hearing, loss, or change of sensation, trouble walking, balance problems coordination problems, shaking, speech problem  Endocrine: cold or heat intolerance, blood sugar problem, weight gain or loss missed periods hot flashes, sweats, change in body hair, change in libido, increased thirst, increased urination  Heme/lymph: Swelling, bleeding, problem anemia, bruising, enlarged lymph nodes  Allergic/immunologic: H. plus nasal drip, watery itchy eyes, nasal drainage, immunosuppressed  The above were reviewed and noted negative except as noted in HPI and Problem List.    Objective   /74 (BP Location: Left arm, Patient Position: Sitting, BP Cuff Size: Adult)   Pulse 77   Ht 1.575 m (5' 2\")   Wt 86.6 kg (191 lb)   SpO2 98%   BMI " 34.93 kg/m²     Physical Exam  CONSTITUTIONAL- NAD, Pt is A & O x3, Vital signs reviewed per chart.  General Appearance- normal , good hygiene,talks easily  EYES-PERRLA conjunctiva and lids- normal  EARS/NOSE-TM's normal, head normocephalic and atraumatic, naso pharynx-no nasal discharge, no trismus,  oropharynx- normal without exudate  NECK- supple, FROM, without mass  thyroid- NT, normal size, no nodule noted  LYMPH- WNL  CV- RRR without murmur, gallop, or other abnormality.  PULM- CTA bilaterally  Respiratory effort- normal respiratory effort , no retractions or nasal flaring  ABDOMEN- normoactive BS's , soft , NT, no hepatosplenomegaly palpated, or masses. No pulsatile masses noted  extremities no edema,NT  SKIN- no abnormal skin lesions on inspection or palpation  neuro- no focal deficits  PSYCH- pleasant, normal judgement and insight    Assessment/Plan   Problem List Items Addressed This Visit             ICD-10-CM    Hyperglycemia R73.9    Relevant Medications    phentermine (Adipex-P) 37.5 mg tablet     Other Visit Diagnoses         Codes    Weight gain     R63.5    Relevant Medications    phentermine (Adipex-P) 37.5 mg tablet          Scribe Attestation  By signing my name below, I, ARY Lopez Scribe   attest that this documentation has been prepared under the direction and in the presence of Gaston Montenegro DO.    Provider Attestation - Scribe documentation    All medical record entries made by the Scribe were at my direction and personally dictated by me. I have reviewed the chart and agree that the record accurately reflects my personal performance of the history, physical exam, discussion and plan.

## 2024-01-30 ENCOUNTER — OFFICE VISIT (OUTPATIENT)
Dept: PRIMARY CARE | Facility: CLINIC | Age: 34
End: 2024-01-30
Payer: COMMERCIAL

## 2024-01-30 VITALS
HEART RATE: 77 BPM | OXYGEN SATURATION: 98 % | HEIGHT: 62 IN | DIASTOLIC BLOOD PRESSURE: 74 MMHG | WEIGHT: 191 LBS | SYSTOLIC BLOOD PRESSURE: 128 MMHG | BODY MASS INDEX: 35.15 KG/M2

## 2024-01-30 DIAGNOSIS — R73.9 HYPERGLYCEMIA: ICD-10-CM

## 2024-01-30 DIAGNOSIS — R63.5 WEIGHT GAIN: ICD-10-CM

## 2024-01-30 PROCEDURE — 99213 OFFICE O/P EST LOW 20 MIN: CPT | Performed by: FAMILY MEDICINE

## 2024-01-30 RX ORDER — PHENTERMINE HYDROCHLORIDE 37.5 MG/1
37.5 TABLET ORAL
Qty: 30 TABLET | Refills: 0 | Status: SHIPPED | OUTPATIENT
Start: 2024-01-30 | End: 2024-03-19 | Stop reason: SDUPTHER

## 2024-01-31 ENCOUNTER — OFFICE VISIT (OUTPATIENT)
Dept: ORTHOPEDIC SURGERY | Facility: CLINIC | Age: 34
End: 2024-01-31
Payer: COMMERCIAL

## 2024-01-31 DIAGNOSIS — Z96.9 RETAINED ORTHOPEDIC HARDWARE: Primary | ICD-10-CM

## 2024-01-31 PROCEDURE — 1036F TOBACCO NON-USER: CPT | Performed by: STUDENT IN AN ORGANIZED HEALTH CARE EDUCATION/TRAINING PROGRAM

## 2024-01-31 PROCEDURE — 99024 POSTOP FOLLOW-UP VISIT: CPT | Performed by: STUDENT IN AN ORGANIZED HEALTH CARE EDUCATION/TRAINING PROGRAM

## 2024-01-31 NOTE — PROGRESS NOTES
Chief Complaint   Patient presents with    Right Ankle - Post-op     1. Right ankle hardware removal   DOS- 1/18/23 ( 2 weeks out)      History of Present Illness  Patient returns today endorsing mild pain.  No numbness or tingling.  No calf pain or shortness of breath.      Review of Systems    GENERAL: Negative for malaise, significant weight loss, fever  MUSCULOSKELETAL: see HPI  NEURO:  Negative     Exam  Moderate swelling / ecchymosis with tenderness to palpation near incisions.  Incision healthy and intact.  No active drainage.  +                     Plantar/dorsiflexion  +                     SILT over foot:  superficial peroneal nerve, deep peroneal nerve, tibial nerve  Good cap refill     Radiographs: None today     Assessment  Patient status post hardware removal right ankle     Plan  Immobilization: Weightbearing as tolerated  Weight bearing: non weight bearing   Encouraged elevation, ice, analgesics   Discussed DVT risk and prevention.

## 2024-02-05 ENCOUNTER — SPECIALTY PHARMACY (OUTPATIENT)
Dept: PHARMACY | Facility: CLINIC | Age: 34
End: 2024-02-05

## 2024-02-05 ENCOUNTER — PROCEDURE VISIT (OUTPATIENT)
Dept: NEUROLOGY | Facility: CLINIC | Age: 34
End: 2024-02-05
Payer: COMMERCIAL

## 2024-02-05 DIAGNOSIS — G43.711 CHRONIC MIGRAINE WITHOUT AURA, WITH INTRACTABLE MIGRAINE, SO STATED, WITH STATUS MIGRAINOSUS: Primary | ICD-10-CM

## 2024-02-05 PROCEDURE — 64615 CHEMODENERV MUSC MIGRAINE: CPT | Performed by: STUDENT IN AN ORGANIZED HEALTH CARE EDUCATION/TRAINING PROGRAM

## 2024-02-05 PROCEDURE — RXMED WILLOW AMBULATORY MEDICATION CHARGE

## 2024-02-05 RX ORDER — BIOTIN 10 MG
TABLET ORAL
COMMUNITY
Start: 2019-12-12

## 2024-02-05 RX ORDER — AZELASTINE 1 MG/ML
SPRAY, METERED NASAL
COMMUNITY
Start: 2020-07-20

## 2024-02-05 RX ORDER — ONDANSETRON HYDROCHLORIDE 8 MG/1
TABLET, FILM COATED ORAL
COMMUNITY
Start: 2020-06-11 | End: 2024-03-19 | Stop reason: WASHOUT

## 2024-02-05 RX ORDER — BUPROPION HYDROCHLORIDE 150 MG/1
TABLET ORAL
COMMUNITY
Start: 2021-09-16 | End: 2024-03-19 | Stop reason: WASHOUT

## 2024-02-05 RX ORDER — LEVOCETIRIZINE DIHYDROCHLORIDE 5 MG/1
TABLET, FILM COATED ORAL
COMMUNITY
Start: 2020-07-20

## 2024-02-05 RX ORDER — MONTELUKAST SODIUM 10 MG/1
TABLET ORAL
COMMUNITY
Start: 2020-07-20

## 2024-02-05 RX ORDER — KETOROLAC TROMETHAMINE 60 MG/2ML
2 INJECTION, SOLUTION INTRAMUSCULAR
COMMUNITY
Start: 2021-06-30

## 2024-02-05 RX ORDER — PROPRANOLOL HYDROCHLORIDE 60 MG/1
CAPSULE, EXTENDED RELEASE ORAL
COMMUNITY
Start: 2020-02-04

## 2024-02-05 NOTE — PROGRESS NOTES
Neurology Botulinum Injection    Subjective   Violette Dee is an 34 y.o. female here for medical botulinum injection for Chronic migraine without aura, with intractable migraine, so stated, with status migrainosus [G43.711].     Since last Botox, having 30/30 HA days per month, but severity is >75% improved. Denies any side effects to Botox. Has not received atogepant yet. Has had a severe headache for the last 3 days straight.     Head/Face/Jaw Botulinum Injection    Date/Time: 2/5/2024 4:03 PM    Performed by: Jeremi Blankenship DO  Authorized by: Jeremi Blankenship DO      Procedure details:        Total units injected:  0     Total units wasted:  0    Comments: Procedure Note: PREEMPT Botox    Indications: Chronic Migraine    Informed consent was obtained (explaining the procedure and risks and benefits of procedure) from patient: the signed consent form was placed in the medical record.  A time out was completed, verifying correct patient, procedure,site, positioning, and implants or special equipment.    200 units of OnabotulinumtoxinA were reconstituted with saline. Sites of injection were identified via PREEMPT protocol and cleaned with alcohol pads. Using a 30 gauge needle, patient received following injections:    5 units in procerus  5 units in each left and right   10 units divided between 2 sites of L frontalis  10 units divided between 2 sites of R frontalis  20 units divided between 4 sites of L temporalis  20 units divided between 4 sites of R temporalis  15 units divided between 3 sites of L occipitalis  15 units divided between 3 sites of R occipitalis  10 units divided between 2 sites of L paracervical muscles  10 units divided between 2 sites of R paracervical muscles  15 units divided between 3 sites of L trapezius  15 units divided between 3 sites of R trapezius    Additional Sites:  10 units divided between 2 sites of L temporalis  10 units divided between 2 sites of R  temporalis    Used: 175u  Wasted: 25u    There were no complications. Patient was comfortable and left without complaint.     OnabotulinumtoxinA  Lot #: [id number]  Exp: 6/2026         not applicable

## 2024-02-06 ENCOUNTER — PHARMACY VISIT (OUTPATIENT)
Dept: PHARMACY | Facility: CLINIC | Age: 34
End: 2024-02-06
Payer: MEDICAID

## 2024-02-07 DIAGNOSIS — F17.299 OTHER TOBACCO PRODUCT NICOTINE DEPENDENCE WITH NICOTINE-INDUCED DISORDER: ICD-10-CM

## 2024-02-07 RX ORDER — VARENICLINE TARTRATE 0.5 MG/1
0.5 TABLET, FILM COATED ORAL 2 TIMES DAILY
Qty: 60 TABLET | Refills: 0 | Status: SHIPPED | OUTPATIENT
Start: 2024-02-07 | End: 2024-03-08

## 2024-02-07 RX ORDER — VARENICLINE TARTRATE 1 MG/1
1 TABLET, FILM COATED ORAL 2 TIMES DAILY
Qty: 60 TABLET | Refills: 2 | Status: SHIPPED | OUTPATIENT
Start: 2024-02-07 | End: 2024-04-16 | Stop reason: SDUPTHER

## 2024-02-09 ENCOUNTER — OFFICE VISIT (OUTPATIENT)
Dept: ORTHOPEDIC SURGERY | Facility: CLINIC | Age: 34
End: 2024-02-09
Payer: COMMERCIAL

## 2024-02-09 ENCOUNTER — HOSPITAL ENCOUNTER (OUTPATIENT)
Dept: RADIOLOGY | Facility: CLINIC | Age: 34
Discharge: HOME | End: 2024-02-09
Payer: COMMERCIAL

## 2024-02-09 VITALS — WEIGHT: 191 LBS | HEIGHT: 62 IN | BODY MASS INDEX: 35.15 KG/M2

## 2024-02-09 DIAGNOSIS — M25.571 ACUTE RIGHT ANKLE PAIN: ICD-10-CM

## 2024-02-09 DIAGNOSIS — M76.61 ACHILLES TENDINITIS OF RIGHT LOWER EXTREMITY: ICD-10-CM

## 2024-02-09 PROCEDURE — L4361 PNEUMA/VAC WALK BOOT PRE OTS: HCPCS | Performed by: STUDENT IN AN ORGANIZED HEALTH CARE EDUCATION/TRAINING PROGRAM

## 2024-02-09 PROCEDURE — 99214 OFFICE O/P EST MOD 30 MIN: CPT | Performed by: STUDENT IN AN ORGANIZED HEALTH CARE EDUCATION/TRAINING PROGRAM

## 2024-02-09 PROCEDURE — 73610 X-RAY EXAM OF ANKLE: CPT | Mod: RIGHT SIDE | Performed by: STUDENT IN AN ORGANIZED HEALTH CARE EDUCATION/TRAINING PROGRAM

## 2024-02-09 PROCEDURE — 1036F TOBACCO NON-USER: CPT | Performed by: STUDENT IN AN ORGANIZED HEALTH CARE EDUCATION/TRAINING PROGRAM

## 2024-02-09 PROCEDURE — 73610 X-RAY EXAM OF ANKLE: CPT | Mod: RT

## 2024-02-09 PROCEDURE — 99214 OFFICE O/P EST MOD 30 MIN: CPT | Mod: 24 | Performed by: STUDENT IN AN ORGANIZED HEALTH CARE EDUCATION/TRAINING PROGRAM

## 2024-02-09 RX ORDER — METHYLPREDNISOLONE 4 MG/1
TABLET ORAL
Qty: 21 TABLET | Refills: 0 | Status: SHIPPED | OUTPATIENT
Start: 2024-02-09

## 2024-02-09 NOTE — PROGRESS NOTES
Chief Complaint   Patient presents with    Right Ankle - Follow-up       1. Right ankle hardware removal   DOS- 1/18/23 1 year 22 days         History of Present Illness:  The patient presents with left heel pain and swelling in the posterior aspect of the ankle.  The patient notes a chronic insidious pain related to activity and denies acute injury.  The patient denies and prior DVT or shortness of breath.  Pain is described as moderate, sharp / worse with activity and better with rest.    Patient well-known to me status post recent hardware removal ankle    Past Medical History:   Diagnosis Date    Bariatric surgery status 12/12/2019    S/P gastric bypass    Bariatric surgery status 12/12/2019    H/O bariatric surgery    Other specified health status     Known health problems: none    Personal history of other diseases of the respiratory system 01/05/2023    History of upper respiratory infection     Past Surgical History:   Procedure Laterality Date    OTHER SURGICAL HISTORY  12/12/2019    Ankle surgery    OTHER SURGICAL HISTORY  12/12/2019    Gastric bypass surgery       Current Outpatient Medications:     atogepant 30 mg tablet, Take 1 tablet (30 mg) by mouth once daily., Disp: 30 tablet, Rfl: 6    azelastine (Astelin) 137 mcg (0.1 %) nasal spray, Administer into affected nostril(s) once daily., Disp: , Rfl:     biotin 10 mg tablet, Take by mouth once daily., Disp: , Rfl:     buPROPion XL (Wellbutrin XL) 150 mg 24 hr tablet, Take by mouth., Disp: , Rfl:     calcium citrate-vitamin D3 315 mg-6.25 mcg (250 unit) tablet, Take 2 tablets (630 mg) by mouth 2 times a day., Disp: , Rfl:     ketorolac 60 mg/2 mL solution, Inject 2 mL (60 mg) into the muscle., Disp: , Rfl:     levocetirizine (Xyzal) 5 mg tablet, Take by mouth once daily., Disp: , Rfl:     levonorgestrel (Mirena) 21 mcg/24 hours (8 yrs) 52 mg IUD, 52 mg by intrauterine route., Disp: , Rfl:     metFORMIN (Glucophage) 500 mg tablet, Take 1 tab with  dinner.  After 2 weeks, if tolerating well, increase to 2 tabs with dinner., Disp: , Rfl:     methylPREDNISolone (Medrol Dospak) 4 mg tablets, Use as directed by package instructions, Disp: 21 tablet, Rfl: 0    montelukast (Singulair) 10 mg tablet, Take by mouth once daily., Disp: , Rfl:     mv-min/iron/folic/calcium/vitK (WOMEN'S MULTIVITAMIN ORAL), Take 1 tablet by mouth once daily., Disp: , Rfl:     ondansetron (Zofran) 8 mg tablet, Take by mouth., Disp: , Rfl:     pantoprazole (ProtoNix) 40 mg EC tablet, Take 1 tablet (40 mg) by mouth once daily., Disp: , Rfl:     phentermine (Adipex-P) 37.5 mg tablet, Take 1 tablet (37.5 mg) by mouth once daily in the morning. Take before meals., Disp: 30 tablet, Rfl: 0    prenat.vits,dameon,min-iron-folic tablet, Take 1 tablet by mouth once daily., Disp: , Rfl:     propranolol LA (Inderal LA) 60 mg 24 hr capsule, Take by mouth., Disp: , Rfl:     sertraline (Zoloft) 100 mg tablet, Take 1 tablet (100 mg) by mouth once daily., Disp: 90 tablet, Rfl: 3    Sodium Fluoride 5000 Plus 1.1 % dental cream, USE AS DIRECTED TWICE DAILY, Disp: , Rfl:     SUMAtriptan (Imitrex) 50 mg tablet, Take 1 tablet (50 mg) by mouth., Disp: , Rfl:     varenicline (Chantix) 0.5 mg tablet, Take 1 tablet (0.5 mg) by mouth 2 times a day. Take with full glass of water., Disp: 60 tablet, Rfl: 0    varenicline (Chantix) 1 mg tablet, Take 1 tablet (1 mg) by mouth 2 times a day. Take with full glass of water., Disp: 60 tablet, Rfl: 2    Review of Systems   GENERAL: Negative for malaise, significant weight loss, fever  MUSCULOSKELETAL: see HPI  NEURO:  Negative    Physical Examination:  Left Ankle:  Skin healthy and intact  Swelling noted over Achilles tendon distally  No tenderness to palpation: medial/lateral malleoli, lisfranc joint  + tenderness to palpation over Achilles   No palpable defect noted    Passive ROM - dorsi/plantar flexion, inversion, eversion: within normal limits  Unger test normal    Neurovascular exam normal distally  2+ dorsalis pedis pulse and good cap refill    Radiographs:  XR ankle right 3+ views    Result Date: 2/9/2024  Interpreted By:  Colton Wyman III, STUDY: XR ANKLE RIGHT 3+ VIEWS; ;  2/9/2024 2:34 pm   INDICATION: Signs/Symptoms:pain.   COMPARISON: None.   ACCESSION NUMBER(S): [id number]   ORDERING CLINICIAN: COLTON WYMAN   FINDINGS: Three views right ankle: Status post hardware removal right ankle. No appreciable fracture. Maintained joint space within the ankle articulation. Retained small metallic fragments distal fibula well as distal tibial       Status post hardware removal ankle     MACRO: None   Signed by: Colton Wyman III 2/9/2024 3:37 PM Dictation workstation:   [id number]    Assessment:  Patient with left Achilles tendonitis     Plan:  We discussed Achilles tendonitis with the patient.   We reviewed the role for rest, immobilization (boot), and activity modification.  We discussed formal therapy and home exercises.  Oral medications (NSAIDS, steroids) were reviewed including risks and benefits.  The unpredictable nature of inflammatory processes was reviewed as well as the protracted course with surgical management and limited role.    Will proceed with immobilization with walking boot for the next 6 weeks discussed importance of using pain as a guide  We will also provide a prescription for oral Medrol Dosepak

## 2024-02-14 ENCOUNTER — TELEPHONE (OUTPATIENT)
Dept: ORTHOPEDIC SURGERY | Facility: CLINIC | Age: 34
End: 2024-02-14
Payer: COMMERCIAL

## 2024-02-14 NOTE — TELEPHONE ENCOUNTER
Spoke with Dr. Sim, he said to have her come back in to be re-evaluated.  She can either come to the [facility] office to to the walk in or schedule an appointment with him.    Sent [employer] message to patient explaining that.

## 2024-03-07 ENCOUNTER — SPECIALTY PHARMACY (OUTPATIENT)
Dept: PHARMACY | Facility: CLINIC | Age: 34
End: 2024-03-07

## 2024-03-08 DIAGNOSIS — G43.711 CHRONIC MIGRAINE WITHOUT AURA, WITH INTRACTABLE MIGRAINE, SO STATED, WITH STATUS MIGRAINOSUS: Primary | ICD-10-CM

## 2024-03-11 DIAGNOSIS — G43.711 CHRONIC MIGRAINE WITHOUT AURA, WITH INTRACTABLE MIGRAINE, SO STATED, WITH STATUS MIGRAINOSUS: ICD-10-CM

## 2024-03-12 DIAGNOSIS — G43.711 CHRONIC MIGRAINE WITHOUT AURA, WITH INTRACTABLE MIGRAINE, SO STATED, WITH STATUS MIGRAINOSUS: ICD-10-CM

## 2024-03-12 PROCEDURE — RXMED WILLOW AMBULATORY MEDICATION CHARGE

## 2024-03-19 ENCOUNTER — OFFICE VISIT (OUTPATIENT)
Dept: PRIMARY CARE | Facility: CLINIC | Age: 34
End: 2024-03-19
Payer: COMMERCIAL

## 2024-03-19 VITALS
DIASTOLIC BLOOD PRESSURE: 74 MMHG | SYSTOLIC BLOOD PRESSURE: 128 MMHG | HEART RATE: 74 BPM | OXYGEN SATURATION: 98 % | BODY MASS INDEX: 30.37 KG/M2 | WEIGHT: 189 LBS | HEIGHT: 66 IN

## 2024-03-19 DIAGNOSIS — R63.5 WEIGHT GAIN: ICD-10-CM

## 2024-03-19 DIAGNOSIS — R45.4 ANGER: ICD-10-CM

## 2024-03-19 DIAGNOSIS — F41.9 ANXIETY: ICD-10-CM

## 2024-03-19 DIAGNOSIS — R45.86 MOOD SWINGS: ICD-10-CM

## 2024-03-19 DIAGNOSIS — R63.4 WEIGHT LOSS: ICD-10-CM

## 2024-03-19 DIAGNOSIS — F32.A DEPRESSION, UNSPECIFIED DEPRESSION TYPE: Primary | ICD-10-CM

## 2024-03-19 DIAGNOSIS — R73.9 HYPERGLYCEMIA: ICD-10-CM

## 2024-03-19 PROBLEM — J02.9 SORE THROAT: Status: RESOLVED | Noted: 2023-04-02 | Resolved: 2024-03-19

## 2024-03-19 PROBLEM — L29.9 PRURITUS: Status: RESOLVED | Noted: 2023-04-02 | Resolved: 2024-03-19

## 2024-03-19 PROBLEM — R09.89 CHEST CONGESTION: Status: RESOLVED | Noted: 2023-04-02 | Resolved: 2024-03-19

## 2024-03-19 PROBLEM — R68.89 FLU-LIKE SYMPTOMS: Status: RESOLVED | Noted: 2023-04-02 | Resolved: 2024-03-19

## 2024-03-19 PROBLEM — R51.9 DAILY HEADACHE: Status: RESOLVED | Noted: 2023-04-02 | Resolved: 2024-03-19

## 2024-03-19 PROBLEM — R39.9 UTI SYMPTOMS: Status: RESOLVED | Noted: 2023-04-02 | Resolved: 2024-03-19

## 2024-03-19 PROBLEM — R05.9 COUGH: Status: RESOLVED | Noted: 2023-04-02 | Resolved: 2024-03-19

## 2024-03-19 PROBLEM — M25.562 LEFT KNEE PAIN: Status: RESOLVED | Noted: 2023-04-02 | Resolved: 2024-03-19

## 2024-03-19 PROBLEM — L29.9 ITCHING: Status: RESOLVED | Noted: 2023-04-02 | Resolved: 2024-03-19

## 2024-03-19 PROBLEM — R53.83 FATIGUE: Status: RESOLVED | Noted: 2023-04-02 | Resolved: 2024-03-19

## 2024-03-19 PROBLEM — N39.0 UTI (URINARY TRACT INFECTION): Status: RESOLVED | Noted: 2023-04-02 | Resolved: 2024-03-19

## 2024-03-19 PROBLEM — M54.9 BACK PAIN: Status: RESOLVED | Noted: 2023-04-02 | Resolved: 2024-03-19

## 2024-03-19 PROBLEM — R20.0 BILATERAL HAND NUMBNESS: Status: RESOLVED | Noted: 2023-04-02 | Resolved: 2024-03-19

## 2024-03-19 PROBLEM — J32.9 SINUSITIS: Status: RESOLVED | Noted: 2023-04-02 | Resolved: 2024-03-19

## 2024-03-19 PROBLEM — J40 BRONCHITIS: Status: RESOLVED | Noted: 2023-04-02 | Resolved: 2024-03-19

## 2024-03-19 PROBLEM — J30.9 ALLERGIC RHINITIS: Status: RESOLVED | Noted: 2023-04-02 | Resolved: 2024-03-19

## 2024-03-19 PROBLEM — J06.9 URI (UPPER RESPIRATORY INFECTION): Status: RESOLVED | Noted: 2023-04-02 | Resolved: 2024-03-19

## 2024-03-19 PROCEDURE — 99214 OFFICE O/P EST MOD 30 MIN: CPT | Performed by: FAMILY MEDICINE

## 2024-03-19 RX ORDER — PHENTERMINE HYDROCHLORIDE 37.5 MG/1
37.5 TABLET ORAL
Qty: 30 TABLET | Refills: 0 | Status: SHIPPED | OUTPATIENT
Start: 2024-03-19 | End: 2024-04-16 | Stop reason: SDUPTHER

## 2024-03-19 NOTE — PROGRESS NOTES
Subjective   Patient ID: Violette Dee is a 34 y.o. female who presents for Anxiety.    HPI Pt would like to discuss her anxiety today and would like a referral to talk with someone.Pt states she feels angry at times.    Pt would like to discuss something to go with her Adipex to help with weight loss.    Review of Systems  12 Systems have been reviewed as follows.  Constitutional: Fever, weight gain, weight loss, appetite change, night sweats, fatigue, chills.  Eyes : blurry, double vision, vision, loss, tearing, redness, pain, sensitivity to light, glaucoma.  Ears: nose, mouth, and throat: Hearing loss, ringing in the ears, ear pain, nasal congestion, nasal drainage, nosebleeds, mouth, throat, irritation tooth problem.  Cardiovascular: chest pain, pressure, heart racing, palpitations, sweating, leg swelling, high or low blood pressure  Pulmonary: Cough, yellow or green sputum, blood and sputum, shortness of breath, wheezing  Gastrointestinal: Nausea, vomiting, diarrhea, constipation, pain, blood in stool, or vomitus, heartburn, difficulty swallowing  Musculoskeletal: Pain, stiffness, joint, redness or warmth, arthritis, back pain, weakness, muscle wasting, sprain or fracture  Neuro: Weight weakness, dizziness, change in voice, change in taste change in vision, change in hearing, loss, or change of sensation, trouble walking, balance problems coordination problems, shaking, speech problem  Endocrine: cold or heat intolerance, blood sugar problem, weight gain or loss missed periods hot flashes, sweats, change in body hair, change in libido, increased thirst, increased urination  Heme/lymph: Swelling, bleeding, problem anemia, bruising, enlarged lymph nodes  Allergic/immunologic: H. plus nasal drip, watery itchy eyes, nasal drainage, immunosuppressed  The above were reviewed and noted negative except as noted in HPI and Problem List.    Objective   /74 (BP Location: Left arm, Patient Position: Sitting, BP  "Cuff Size: Adult)   Pulse 74   Ht 1.676 m (5' 6\")   Wt 85.7 kg (189 lb)   SpO2 98%   BMI 30.51 kg/m²     Physical Exam  CONSTITUTIONAL- NAD, Pt is A & O x3, Vital signs reviewed per chart.  General Appearance- normal , good hygiene,talks easily  EYES-PERRLA conjunctiva and lids- normal  EARS/NOSE-TM's normal, head normocephalic and atraumatic, naso pharynx-no nasal discharge, no trismus,  oropharynx- normal without exudate  NECK- supple, FROM, without mass  thyroid- NT, normal size, no nodule noted  LYMPH- WNL  CV- RRR without murmur, gallop, or other abnormality.  PULM- CTA bilaterally  Respiratory effort- normal respiratory effort , no retractions or nasal flaring  ABDOMEN- normoactive BS's , soft , NT, no hepatosplenomegaly palpated, or masses. No pulsatile masses noted  extremities no edema,NT  SKIN- no abnormal skin lesions on inspection or palpation  neuro- no focal deficits  PSYCH- pleasant, normal judgement and insight    Assessment/Plan   Problem List Items Addressed This Visit             ICD-10-CM    Anxiety F41.9    Relevant Medications    cariprazine (Vraylar) 1.5 mg capsule    Other Relevant Orders    Follow Up In [facility]    Depression - Primary F32.A    Relevant Medications    cariprazine (Vraylar) 1.5 mg capsule    Other Relevant Orders    Follow Up In [facility]    Hyperglycemia R73.9    Relevant Medications    phentermine (Adipex-P) 37.5 mg tablet    Anger R45.4    Mood swings R45.86     Other Visit Diagnoses         Codes    Weight loss     R63.4    Weight gain     R63.5    Relevant Medications    phentermine (Adipex-P) 37.5 mg tablet               "

## 2024-03-22 ENCOUNTER — SPECIALTY PHARMACY (OUTPATIENT)
Dept: PHARMACY | Facility: CLINIC | Age: 34
End: 2024-03-22

## 2024-03-22 PROCEDURE — RXMED WILLOW AMBULATORY MEDICATION CHARGE

## 2024-03-25 ENCOUNTER — PHARMACY VISIT (OUTPATIENT)
Dept: PHARMACY | Facility: CLINIC | Age: 34
End: 2024-03-25
Payer: MEDICAID

## 2024-03-26 ENCOUNTER — PHARMACY VISIT (OUTPATIENT)
Dept: PHARMACY | Facility: CLINIC | Age: 34
End: 2024-03-26
Payer: MEDICAID

## 2024-03-26 ENCOUNTER — TELEPHONE (OUTPATIENT)
Dept: PRIMARY CARE | Facility: CLINIC | Age: 34
End: 2024-03-26
Payer: COMMERCIAL

## 2024-03-26 NOTE — PROGRESS NOTES
Writer outreached pt regarding their referral to Collaborative Care. Explained program and answered pt's questions. Pt requested to schedule future initial assessment. Pt is scheduled for 4/9/2024 @ 11am via video.

## 2024-03-28 ENCOUNTER — TELEPHONE (OUTPATIENT)
Dept: PRIMARY CARE | Facility: CLINIC | Age: 34
End: 2024-03-28
Payer: COMMERCIAL

## 2024-03-28 NOTE — TELEPHONE ENCOUNTER
Patient of Dr. Moni Mix submitted for cariprazine hcl    It is denied    Formulary medications    Aripioprazole  Quetiapine er

## 2024-04-03 ENCOUNTER — APPOINTMENT (OUTPATIENT)
Dept: ORTHOPEDIC SURGERY | Facility: CLINIC | Age: 34
End: 2024-04-03
Payer: COMMERCIAL

## 2024-04-09 ENCOUNTER — SOCIAL WORK (OUTPATIENT)
Dept: PRIMARY CARE | Facility: CLINIC | Age: 34
End: 2024-04-09
Payer: COMMERCIAL

## 2024-04-09 ASSESSMENT — ANXIETY QUESTIONNAIRES
7. FEELING AFRAID AS IF SOMETHING AWFUL MIGHT HAPPEN: NEARLY EVERY DAY
4. TROUBLE RELAXING: NEARLY EVERY DAY
GAD7 TOTAL SCORE: 19
6. BECOMING EASILY ANNOYED OR IRRITABLE: NEARLY EVERY DAY
5. BEING SO RESTLESS THAT IT IS HARD TO SIT STILL: NEARLY EVERY DAY
2. NOT BEING ABLE TO STOP OR CONTROL WORRYING: NEARLY EVERY DAY
IF YOU CHECKED OFF ANY PROBLEMS ON THIS QUESTIONNAIRE, HOW DIFFICULT HAVE THESE PROBLEMS MADE IT FOR YOU TO DO YOUR WORK, TAKE CARE OF THINGS AT HOME, OR GET ALONG WITH OTHER PEOPLE: SOMEWHAT DIFFICULT
1. FEELING NERVOUS, ANXIOUS, OR ON EDGE: SEVERAL DAYS
3. WORRYING TOO MUCH ABOUT DIFFERENT THINGS: NEARLY EVERY DAY

## 2024-04-09 ASSESSMENT — PATIENT HEALTH QUESTIONNAIRE - PHQ9
SUM OF ALL RESPONSES TO PHQ QUESTIONS 1-9: 8
2. FEELING DOWN, DEPRESSED OR HOPELESS: SEVERAL DAYS
SUM OF ALL RESPONSES TO PHQ9 QUESTIONS 1 & 2: 1
8. MOVING OR SPEAKING SO SLOWLY THAT OTHER PEOPLE COULD HAVE NOTICED. OR THE OPPOSITE, BEING SO FIGETY OR RESTLESS THAT YOU HAVE BEEN MOVING AROUND A LOT MORE THAN USUAL: NOT AT ALL
9. THOUGHTS THAT YOU WOULD BE BETTER OFF DEAD, OR OF HURTING YOURSELF: NOT AT ALL
7. TROUBLE CONCENTRATING ON THINGS, SUCH AS READING THE NEWSPAPER OR WATCHING TELEVISION: NOT AT ALL
4. FEELING TIRED OR HAVING LITTLE ENERGY: NEARLY EVERY DAY
6. FEELING BAD ABOUT YOURSELF - OR THAT YOU ARE A FAILURE OR HAVE LET YOURSELF OR YOUR FAMILY DOWN: SEVERAL DAYS
10. IF YOU CHECKED OFF ANY PROBLEMS, HOW DIFFICULT HAVE THESE PROBLEMS MADE IT FOR YOU TO DO YOUR WORK, TAKE CARE OF THINGS AT HOME, OR GET ALONG WITH OTHER PEOPLE: SOMEWHAT DIFFICULT
3. TROUBLE FALLING OR STAYING ASLEEP: NEARLY EVERY DAY
1. LITTLE INTEREST OR PLEASURE IN DOING THINGS: NOT AT ALL
5. POOR APPETITE OR OVEREATING: NOT AT ALL
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
## 2024-04-11 ENCOUNTER — DOCUMENTATION (OUTPATIENT)
Dept: BEHAVIORAL HEALTH | Facility: CLINIC | Age: 34
End: 2024-04-11
Payer: COMMERCIAL

## 2024-04-11 DIAGNOSIS — F32.9 MAJOR DEPRESSIVE DISORDER, REMISSION STATUS UNSPECIFIED, UNSPECIFIED WHETHER RECURRENT: ICD-10-CM

## 2024-04-11 NOTE — PROGRESS NOTES
[facility] Psychiatry Consult Note     Violette Dee is a 34 y.o. y.o., referred to Collaborative Care for symptoms of depression and anxiety. I have reviewed the patient with the behavioral health manager and reviewed the patient's electronic record.    Patient Health Questionnaire-9 Score: 8 (4/9/2024 11:12 AM)  JOYCELYN-7 Total Score: 19 (4/9/2024 11:10 AM)    She has not been in treatment before (no therapy/psychiatry/hospitalizations), but has wrestled with anxiety for many years.  The depression seems to be from the anxiety.  She is on Vraylar and feels that has been helpful.  She has been on several medications - Buspar, Wellbutrin, Cymbalta, Xanax in the past.      She is getting  this summer.  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  She has 12yo and 2yo children and several pets.  She is quitting smoking with Chantix and denies other substances.       Recommendations:   - Continue Zoloft 100mg daily for anxiety and mood.  Can increase by 50mg per day every month up to 200mg if tolerated for anxiety also.   - Continue Vraylar 1.5mg daily for mood/anxiety as ordered.  Augmentation for SSRI. Has been on several other medications for anxiety and mood previously.  - Could consider Trintellix or Viibryd as alternatives if not getting much improvement with the Zoloft combo  - Patient will continue to follow with behavioral health case management.     The above treatment considerations and suggestions are based on consultations with the patient's care manager and a review of information available in the electronic medical record. I have not personally examined the patient. All recommendations should be implemented with consideration of the patient's relevant prior history and current clinical status. Please feel free to contact me with any questions about the care of this patient. I can be reached via the [facility] or Epic/Haiku messenger.

## 2024-04-15 ENCOUNTER — SPECIALTY PHARMACY (OUTPATIENT)
Dept: PHARMACY | Facility: CLINIC | Age: 34
End: 2024-04-15

## 2024-04-16 ENCOUNTER — OFFICE VISIT (OUTPATIENT)
Dept: PRIMARY CARE | Facility: CLINIC | Age: 34
End: 2024-04-16
Payer: COMMERCIAL

## 2024-04-16 VITALS
BODY MASS INDEX: 35.15 KG/M2 | OXYGEN SATURATION: 98 % | DIASTOLIC BLOOD PRESSURE: 78 MMHG | WEIGHT: 191 LBS | HEIGHT: 62 IN | HEART RATE: 76 BPM | SYSTOLIC BLOOD PRESSURE: 126 MMHG

## 2024-04-16 DIAGNOSIS — R73.9 HYPERGLYCEMIA: ICD-10-CM

## 2024-04-16 DIAGNOSIS — K21.9 GASTROESOPHAGEAL REFLUX DISEASE, UNSPECIFIED WHETHER ESOPHAGITIS PRESENT: ICD-10-CM

## 2024-04-16 DIAGNOSIS — F17.299 OTHER TOBACCO PRODUCT NICOTINE DEPENDENCE WITH NICOTINE-INDUCED DISORDER: ICD-10-CM

## 2024-04-16 DIAGNOSIS — F40.243 FEAR OF FLYING: ICD-10-CM

## 2024-04-16 DIAGNOSIS — R05.2 SUBACUTE COUGH: ICD-10-CM

## 2024-04-16 DIAGNOSIS — R63.5 WEIGHT GAIN: ICD-10-CM

## 2024-04-16 DIAGNOSIS — R45.86 MOOD SWINGS: Primary | ICD-10-CM

## 2024-04-16 PROCEDURE — 99214 OFFICE O/P EST MOD 30 MIN: CPT | Performed by: FAMILY MEDICINE

## 2024-04-16 RX ORDER — BENZONATATE 100 MG/1
100 CAPSULE ORAL 3 TIMES DAILY PRN
Qty: 42 CAPSULE | Refills: 0 | Status: SHIPPED | OUTPATIENT
Start: 2024-04-16 | End: 2024-05-16

## 2024-04-16 RX ORDER — VARENICLINE TARTRATE 1 MG/1
1 TABLET, FILM COATED ORAL 2 TIMES DAILY
Qty: 60 TABLET | Refills: 2 | Status: SHIPPED | OUTPATIENT
Start: 2024-04-16 | End: 2024-07-15

## 2024-04-16 RX ORDER — PHENTERMINE HYDROCHLORIDE 37.5 MG/1
37.5 TABLET ORAL
Qty: 30 TABLET | Refills: 0 | Status: SHIPPED | OUTPATIENT
Start: 2024-04-16

## 2024-04-16 RX ORDER — LORAZEPAM 0.5 MG/1
0.5 TABLET ORAL 2 TIMES DAILY PRN
Qty: 10 TABLET | Refills: 0 | Status: SHIPPED | OUTPATIENT
Start: 2024-04-16

## 2024-04-16 RX ORDER — PANTOPRAZOLE SODIUM 40 MG/1
40 TABLET, DELAYED RELEASE ORAL DAILY
Qty: 90 TABLET | Refills: 1 | Status: SHIPPED | OUTPATIENT
Start: 2024-04-16

## 2024-04-16 ASSESSMENT — ENCOUNTER SYMPTOMS
HEADACHES: 0
MYALGIAS: 0
POLYDIPSIA: 0
ADENOPATHY: 0
FLANK PAIN: 0
PALPITATIONS: 0
DYSURIA: 0
FEVER: 0
CONFUSION: 0
EYE PAIN: 0
BLOOD IN STOOL: 0
ABDOMINAL PAIN: 0
DYSPHORIC MOOD: 0
SHORTNESS OF BREATH: 0
SORE THROAT: 0
NECK STIFFNESS: 0
RECTAL PAIN: 0
SEIZURES: 0
POLYPHAGIA: 0
SLEEP DISTURBANCE: 1
RHINORRHEA: 0
CONSTIPATION: 0
ACTIVITY CHANGE: 1
ABDOMINAL DISTENTION: 0
APPETITE CHANGE: 1
SPEECH DIFFICULTY: 0
STRIDOR: 0
DECREASED CONCENTRATION: 0
COUGH: 1
DIARRHEA: 0
CHEST TIGHTNESS: 0
DIZZINESS: 0
HEMATURIA: 0
SINUS PRESSURE: 0
NERVOUS/ANXIOUS: 1
SINUS PAIN: 0
FATIGUE: 0
AGITATION: 0
PHOTOPHOBIA: 0
TROUBLE SWALLOWING: 0
COLOR CHANGE: 0
ARTHRALGIAS: 0

## 2024-04-16 NOTE — PROGRESS NOTES
"Subjective   Patient ID: Violette Dee is a 34 y.o. female who presents for Weight Gain, Cough, and Insomnia.    HPI Pt is currently taking Adipex and has gained 2 pounds.    Pt would like to discuss something for insomnia.    Pt would like to discuss getting a few Xanax as she is flying next week.    Pt states she has been coughing quite a bit and would like to discuss getting something for this.    Review of Systems   Constitutional:  Positive for activity change and appetite change. Negative for fatigue and fever.   HENT:  Negative for congestion, dental problem, ear discharge, ear pain, mouth sores, rhinorrhea, sinus pressure, sinus pain, sore throat, tinnitus and trouble swallowing.    Eyes:  Negative for photophobia, pain and visual disturbance.   Respiratory:  Positive for cough. Negative for chest tightness, shortness of breath and stridor.    Cardiovascular:  Negative for chest pain and palpitations.   Gastrointestinal:  Negative for abdominal distention, abdominal pain, blood in stool, constipation, diarrhea and rectal pain.   Endocrine: Negative for cold intolerance, heat intolerance, polydipsia, polyphagia and polyuria.   Genitourinary:  Negative for dysuria, flank pain, hematuria and urgency.   Musculoskeletal:  Negative for arthralgias, gait problem, myalgias and neck stiffness.   Skin:  Negative for color change and rash.   Allergic/Immunologic: Negative for environmental allergies and food allergies.   Neurological:  Negative for dizziness, seizures, syncope, speech difficulty and headaches.   Hematological:  Negative for adenopathy.   Psychiatric/Behavioral:  Positive for sleep disturbance. Negative for agitation, confusion, decreased concentration and dysphoric mood. The patient is nervous/anxious.      Objective   /78 (BP Location: Left arm, Patient Position: Sitting, BP Cuff Size: Adult)   Pulse 76   Ht 1.575 m (5' 2\")   Wt 86.6 kg (191 lb)   SpO2 98%   BMI 34.93 kg/m²     Physical " Exam    CONSTITUTIONAL- NAD, Pt is A & O x3, Vital signs reviewed per chart.   General Appearance- normal , good hygiene,talks easily   EYES-PERRLA conjunctiva and lids- normal   EARS/NOSE-TM's normal, head normocephalic and atraumatic, naso pharynx-no nasal discharge, no trismus,   oropharynx- normal without exudate   NECK- supple, FROM, without mass   thyroid- NT, normal size, no nodule noted   LYMPH- WNL   CV- RRR without murmur, gallop, or other abnormality.   PULM- CTA bilaterally   Respiratory effort- normal respiratory effort , no retractions or nasal flaring   SKIN- no abnormal skin lesions on inspection or palpation   neuro- no focal deficits   PSYCH- pleasant, normal judgement and insight     Assessment/Plan   Problem List Items Addressed This Visit             ICD-10-CM    GERD (gastroesophageal reflux disease) K21.9    Relevant Medications    pantoprazole (ProtoNix) 40 mg EC tablet    Hyperglycemia R73.9    Relevant Medications    phentermine (Adipex-P) 37.5 mg tablet    Mood swings - Primary R45.86    Fear of flying F40.243    Relevant Medications    LORazepam (Ativan) 0.5 mg tablet     Other Visit Diagnoses         Codes    Weight gain     R63.5    Relevant Medications    phentermine (Adipex-P) 37.5 mg tablet    Other tobacco product nicotine dependence with nicotine-induced disorder     F17.299    Relevant Medications    varenicline (Chantix) 1 mg tablet    Subacute cough     R05.2    Relevant Medications    benzonatate (Tessalon) 100 mg capsule          Scribe Attestation  By signing my name below, IMark RMA, Scribe   attest that this documentation has been prepared under the direction and in the presence of Gaston Montenegro DO.    Provider Attestation - Scribe documentation    All medical record entries made by the Scribe were at my direction and personally dictated by me. I have reviewed the chart and agree that the record accurately reflects my personal performance of the history,  physical exam, discussion and plan.

## 2024-04-18 ENCOUNTER — APPOINTMENT (OUTPATIENT)
Dept: PRIMARY CARE | Facility: CLINIC | Age: 34
End: 2024-04-18
Payer: COMMERCIAL

## 2024-04-30 ENCOUNTER — DOCUMENTATION (OUTPATIENT)
Dept: PRIMARY CARE | Facility: CLINIC | Age: 34
End: 2024-04-30
Payer: COMMERCIAL

## 2024-04-30 DIAGNOSIS — F41.9 ANXIETY: Primary | ICD-10-CM

## 2024-04-30 PROCEDURE — 99492 1ST PSYC COLLAB CARE MGMT: CPT | Performed by: FAMILY MEDICINE

## 2024-05-01 ENCOUNTER — APPOINTMENT (OUTPATIENT)
Dept: PRIMARY CARE | Facility: CLINIC | Age: 34
End: 2024-05-01
Payer: COMMERCIAL

## 2024-05-01 ENCOUNTER — SOCIAL WORK (OUTPATIENT)
Dept: PRIMARY CARE | Facility: CLINIC | Age: 34
End: 2024-05-01

## 2024-05-01 NOTE — PROGRESS NOTES
Collaborative Care ([facility])  Progress Note    Type of Interaction: Phone    Start Time: 1030am    End Time: 1038am    Appointment: Not Scheduled    Reason for Visit:   Chief Complaint   Patient presents with    Anxiety    Follow up    Interval History / Patient Symptoms:     No data recorded    Interventions Provided: Referrals    Progress Made: None    Response to Intervention:   -Writer outreached pt at pt's request through  to reschedule today's follow up appointment due to getting back from vacation and everything being too chaotic.  -Writer informed pt there is no Co[facility] colleague accepting child and adolescent patients at this time and provided pt with a list of resources to look into.     Plan: Follow up for rescheduled appointment.     Care Plan    There is no care plan documentation to display.       Patient Instructions   Follow up scheduled on 5/14/2024 @ 1pm in person.     Follow Up / Next Appointment: Next appointment: 05/14/24

## 2024-05-06 ENCOUNTER — PROCEDURE VISIT (OUTPATIENT)
Dept: NEUROLOGY | Facility: CLINIC | Age: 34
End: 2024-05-06
Payer: COMMERCIAL

## 2024-05-06 DIAGNOSIS — G43.709 CHRONIC MIGRAINE WITHOUT AURA WITHOUT STATUS MIGRAINOSUS, NOT INTRACTABLE: Primary | ICD-10-CM

## 2024-05-06 PROCEDURE — 64615 CHEMODENERV MUSC MIGRAINE: CPT | Performed by: STUDENT IN AN ORGANIZED HEALTH CARE EDUCATION/TRAINING PROGRAM

## 2024-05-06 NOTE — PROGRESS NOTES
Neurology Botulinum Injection    Subjective   Violette Dee is an 34 y.o. female here for medical botulinum injection for Chronic migraine without aura without status migrainosus, not intractable [G43.709].     Since last botox having 10/30 HA days per month, of which 2 are severe. Denies any side effects to Botox and would like to continue today.     Botox    Date/Time: 5/6/2024 2:56 PM    Performed by: Jeremi Blankenship DO  Authorized by: Jeremi Blankenship DO    Procedure specific details:      Procedure Note: PREEMPT Botox    Indications: Chronic Migraine    Informed consent was obtained (explaining the procedure and risks and benefits of procedure) from patient: the signed consent form was placed in the medical record.  A time out was completed, verifying correct patient, procedure,site, positioning, and implants or special equipment.    200 units of OnabotulinumtoxinA were reconstituted with saline. Sites of injection were identified via PREEMPT protocol and cleaned with alcohol pads. Using a 30 gauge needle, patient received following injections:    5 units in procerus  5 units in each left and right   10 units divided between 2 sites of L frontalis  10 units divided between 2 sites of R frontalis  20 units divided between 4 sites of L temporalis  20 units divided between 4 sites of R temporalis  15 units divided between 3 sites of L occipitalis  15 units divided between 3 sites of R occipitalis  10 units divided between 2 sites of L paracervical muscles  10 units divided between 2 sites of R paracervical muscles  15 units divided between 3 sites of L trapezius  15 units divided between 3 sites of R trapezius    Additional Sites:  10u L temporalis  10u R temporalis    Used: 175u  Wasted: 25u    There were no complications. Patient was comfortable and left without complaint.     OnabotulinumtoxinA  Lot #: [id number]  Exp: 6/2026

## 2024-05-14 ENCOUNTER — SOCIAL WORK (OUTPATIENT)
Dept: PRIMARY CARE | Facility: CLINIC | Age: 34
End: 2024-05-14

## 2024-05-14 ENCOUNTER — SPECIALTY PHARMACY (OUTPATIENT)
Dept: PHARMACY | Facility: CLINIC | Age: 34
End: 2024-05-14

## 2024-05-14 ENCOUNTER — APPOINTMENT (OUTPATIENT)
Dept: PRIMARY CARE | Facility: CLINIC | Age: 34
End: 2024-05-14
Payer: COMMERCIAL

## 2024-05-14 PROCEDURE — RXMED WILLOW AMBULATORY MEDICATION CHARGE

## 2024-05-14 NOTE — PROGRESS NOTES
Collaborative Care (CoCM)  Progress Note    Type of Interaction: Phone    Start Time: 1pm    End Time: 2pm    Appointment: Scheduled    Reason for Visit:   Chief Complaint   Patient presents with    Anxiety    Follow up    Interval History / Patient Symptoms:     No data recorded    Interventions Provided: Communication Training    Progress Made: Moderate    Response to Intervention:   -Pt and writer discussed pt's daughters initial assessment and the need to change it to next Thursday due to pt's daughter wanting to do it in person.  -Pt discussed difficulty surrounding anger outbursts and arguments with her . Pt stated he had just got back from a work trip where he was gone for 10 weeks and the readjustment period has been difficult with pt having to do everything and now he is back and trying to control everything.  -Pt and writer explored history of pt's behavior in managing anger and explosive arguments as a result of pt bottling up resentments over time and exploding at any little thing when its become too much.  -Pt stated she has so much going on between the last two weeks of school and prepping for the wedding that is taking place in 4 months.  -Pt provided writer with history of David's (pt's ) trauma from childhood in addition to his ex wife who put him in debt and cheated on him.  -Pt stated he is also going through therapy to attempt to address those concerns.  -Pt also discussed her difficulty with not working and having her own money. Pt is currently having to ask David for any amount of money and he is in complete control of finances. 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  -Writer suggested finding time for self-care and strongly encouraged doing so on a daily routine.  -Writer suggested addressing things that upset her within the same day following when  she is cooled down so that her emotions don't build up over time and explode as they have been.  -Pt stated she has been working on addressing things as they happen and taking care of what she needs to for herself whenever she can. Pt stated she would continue to do so.     Plan: Pt to start working on self care routine and communicating anger/frustration with her spouse as tensions rise in order to reduce anxiety and explosive episodes. Assess progress pt has made at meeting self-care needs and engaging in positive coping mechanisms at next appt.     Care Plan    There is no care plan documentation to display.       Patient Instructions   Follow up scheduled on 6/11/2024 @ 10am in person.     Follow Up / Next Appointment: Next appointment: 06/11/24

## 2024-05-21 ENCOUNTER — SPECIALTY PHARMACY (OUTPATIENT)
Dept: PHARMACY | Facility: CLINIC | Age: 34
End: 2024-05-21

## 2024-05-28 ENCOUNTER — PHARMACY VISIT (OUTPATIENT)
Dept: PHARMACY | Facility: CLINIC | Age: 34
End: 2024-05-28
Payer: MEDICAID

## 2024-05-31 ENCOUNTER — DOCUMENTATION (OUTPATIENT)
Dept: PRIMARY CARE | Facility: CLINIC | Age: 34
End: 2024-05-31
Payer: COMMERCIAL

## 2024-05-31 DIAGNOSIS — F41.9 ANXIETY: Primary | ICD-10-CM

## 2024-05-31 PROCEDURE — 99493 SBSQ PSYC COLLAB CARE MGMT: CPT | Performed by: FAMILY MEDICINE

## 2024-06-11 ENCOUNTER — SOCIAL WORK (OUTPATIENT)
Dept: PRIMARY CARE | Facility: CLINIC | Age: 34
End: 2024-06-11

## 2024-06-11 ENCOUNTER — APPOINTMENT (OUTPATIENT)
Dept: PRIMARY CARE | Facility: CLINIC | Age: 34
End: 2024-06-11
Payer: COMMERCIAL

## 2024-06-11 ASSESSMENT — ANXIETY QUESTIONNAIRES
6. BECOMING EASILY ANNOYED OR IRRITABLE: SEVERAL DAYS
4. TROUBLE RELAXING: MORE THAN HALF THE DAYS
IF YOU CHECKED OFF ANY PROBLEMS ON THIS QUESTIONNAIRE, HOW DIFFICULT HAVE THESE PROBLEMS MADE IT FOR YOU TO DO YOUR WORK, TAKE CARE OF THINGS AT HOME, OR GET ALONG WITH OTHER PEOPLE: SOMEWHAT DIFFICULT
3. WORRYING TOO MUCH ABOUT DIFFERENT THINGS: SEVERAL DAYS
7. FEELING AFRAID AS IF SOMETHING AWFUL MIGHT HAPPEN: NOT AT ALL
1. FEELING NERVOUS, ANXIOUS, OR ON EDGE: SEVERAL DAYS
GAD7 TOTAL SCORE: 8
2. NOT BEING ABLE TO STOP OR CONTROL WORRYING: SEVERAL DAYS
5. BEING SO RESTLESS THAT IT IS HARD TO SIT STILL: MORE THAN HALF THE DAYS

## 2024-06-11 ASSESSMENT — PATIENT HEALTH QUESTIONNAIRE - PHQ9
1. LITTLE INTEREST OR PLEASURE IN DOING THINGS: SEVERAL DAYS
7. TROUBLE CONCENTRATING ON THINGS, SUCH AS READING THE NEWSPAPER OR WATCHING TELEVISION: NOT AT ALL
9. THOUGHTS THAT YOU WOULD BE BETTER OFF DEAD, OR OF HURTING YOURSELF: NOT AT ALL
SUM OF ALL RESPONSES TO PHQ QUESTIONS 1-9: 8
5. POOR APPETITE OR OVEREATING: NOT AT ALL
2. FEELING DOWN, DEPRESSED OR HOPELESS: SEVERAL DAYS
10. IF YOU CHECKED OFF ANY PROBLEMS, HOW DIFFICULT HAVE THESE PROBLEMS MADE IT FOR YOU TO DO YOUR WORK, TAKE CARE OF THINGS AT HOME, OR GET ALONG WITH OTHER PEOPLE: SOMEWHAT DIFFICULT
6. FEELING BAD ABOUT YOURSELF - OR THAT YOU ARE A FAILURE OR HAVE LET YOURSELF OR YOUR FAMILY DOWN: NOT AT ALL
8. MOVING OR SPEAKING SO SLOWLY THAT OTHER PEOPLE COULD HAVE NOTICED. OR THE OPPOSITE, BEING SO FIGETY OR RESTLESS THAT YOU HAVE BEEN MOVING AROUND A LOT MORE THAN USUAL: NOT AT ALL
4. FEELING TIRED OR HAVING LITTLE ENERGY: NEARLY EVERY DAY
SUM OF ALL RESPONSES TO PHQ9 QUESTIONS 1 & 2: 2
3. TROUBLE FALLING OR STAYING ASLEEP: NEARLY EVERY DAY

## 2024-06-11 NOTE — PROGRESS NOTES
Collaborative Care (CoCM)  Progress Note    Type of Interaction: In Office    Start Time: 9am    End Time: 10am    Appointment: Scheduled    Reason for Visit:   Chief Complaint   Patient presents with    Anxiety    Follow up    Interval History / Patient Symptoms:     Patient Health Questionnaire-9 Score: 8 (6/11/2024 10:13 AM)  JOYCELYN-7 Total Score: 8 (6/11/2024 10:12 AM)    Interventions Provided: Motivational Interviewing and Solution Focused Therapy    Progress Made: Moderate    Response to Intervention:   -Pt and writer discussed pt's daughter finding a counselor through [facility] that is more suitable for her needs due to being a female and in her 20s.  -Pt disclosed feeling much better due to school being out for the summer, having more free time to get everything accomplished she would like and addressing everything at the house that is needed.  -Pt and writer explored difficulty with daughter imitating David's behavior with throwing dishes in the sink and not taking care of them. Due to pt's  being busted it is something that piles up very fast and she is the one who has to take care of it.  -Pt stated she is also done with school so there is a lot of pressure taken off there and that all the debt for the wedding will be paid off this Friday. Pt and writer discussed motivational interviewing tactics/scenarios for pt's daughter and David to attempt to get them to help around the house more. Pt disclosed daughter cleans when she knows she is having friends over and plans to have them over as often as possible due to the relief she gets with the kids and daughter helping out significantly more.  -Pt disclosed further background with the family dynamic of daughters biological father and his decision to choose a different family and his desire to sign his rights over so he doesn't have to pay child support anymore.   -Writer suggested picturing that and how that makes Katelynairam feel in  "addition to changing the way she responds to Melyna the next time Maria Cyna gets \"snippy\" with her. Instead of escalating, to ask what is really going on or what it is that is upsetting her so much?  -Writer suggested addressing things that upset her within the same day following when she is cooled down so that her emotions don't build up over time and explode as they have been.    Plan: Pt to continue working on self care routine and communicating anger/frustration with her spouse and daughter as tensions rise in order to reduce anxiety and explosive episodes. Assess progress pt has made at meeting self-care needs and engaging in positive coping mechanisms at next appt.      Care Plan    There is no care plan documentation to display.       Patient Instructions   Follow up scheduled on 7/9/2024 @ 9am in person.     Follow Up / Next Appointment: Next appointment: 07/09/24      "

## 2024-06-13 DIAGNOSIS — R73.9 HYPERGLYCEMIA: ICD-10-CM

## 2024-06-13 DIAGNOSIS — R63.5 WEIGHT GAIN: ICD-10-CM

## 2024-06-15 RX ORDER — PHENTERMINE HYDROCHLORIDE 37.5 MG/1
37.5 TABLET ORAL
Qty: 30 TABLET | Refills: 0 | Status: SHIPPED | OUTPATIENT
Start: 2024-06-15

## 2024-06-18 ENCOUNTER — SPECIALTY PHARMACY (OUTPATIENT)
Dept: PHARMACY | Facility: CLINIC | Age: 34
End: 2024-06-18

## 2024-06-20 ENCOUNTER — SPECIALTY PHARMACY (OUTPATIENT)
Dept: PHARMACY | Facility: CLINIC | Age: 34
End: 2024-06-20

## 2024-06-28 ENCOUNTER — DOCUMENTATION (OUTPATIENT)
Dept: PRIMARY CARE | Facility: CLINIC | Age: 34
End: 2024-06-28
Payer: COMMERCIAL

## 2024-06-28 DIAGNOSIS — F41.9 ANXIETY: Primary | ICD-10-CM

## 2024-06-28 PROCEDURE — 99493 SBSQ PSYC COLLAB CARE MGMT: CPT | Performed by: FAMILY MEDICINE

## 2024-07-05 ENCOUNTER — APPOINTMENT (OUTPATIENT)
Dept: PRIMARY CARE | Facility: CLINIC | Age: 34
End: 2024-07-05
Payer: COMMERCIAL

## 2024-07-09 ENCOUNTER — APPOINTMENT (OUTPATIENT)
Dept: PRIMARY CARE | Facility: CLINIC | Age: 34
End: 2024-07-09
Payer: COMMERCIAL

## 2024-07-10 ENCOUNTER — TELEPHONE (OUTPATIENT)
Dept: PRIMARY CARE | Facility: CLINIC | Age: 34
End: 2024-07-10
Payer: COMMERCIAL

## 2024-07-10 NOTE — PROGRESS NOTES
Writer attempted to outreach pt for scheduling follow up appointment. LVM with approximate time for next attempt.

## 2024-07-11 ENCOUNTER — TELEPHONE (OUTPATIENT)
Dept: PRIMARY CARE | Facility: CLINIC | Age: 34
End: 2024-07-11
Payer: COMMERCIAL

## 2024-07-11 NOTE — PROGRESS NOTES
Writer attempted outreach to reschedule pt's cancelled appointment for this month. LVM for follow up.

## 2024-07-16 DIAGNOSIS — R73.9 HYPERGLYCEMIA: ICD-10-CM

## 2024-07-17 RX ORDER — SEMAGLUTIDE 0.68 MG/ML
0.25 INJECTION, SOLUTION SUBCUTANEOUS
Qty: 2 ML | Refills: 0 | Status: SHIPPED | COMMUNITY
Start: 2024-07-17

## 2024-07-30 ENCOUNTER — OFFICE VISIT (OUTPATIENT)
Dept: PRIMARY CARE | Facility: CLINIC | Age: 34
End: 2024-07-30
Payer: COMMERCIAL

## 2024-07-30 VITALS
DIASTOLIC BLOOD PRESSURE: 80 MMHG | HEIGHT: 62 IN | BODY MASS INDEX: 32.35 KG/M2 | WEIGHT: 175.8 LBS | OXYGEN SATURATION: 99 % | HEART RATE: 102 BPM | RESPIRATION RATE: 16 BRPM | TEMPERATURE: 97.7 F | SYSTOLIC BLOOD PRESSURE: 124 MMHG

## 2024-07-30 DIAGNOSIS — R63.5 WEIGHT GAIN: ICD-10-CM

## 2024-07-30 DIAGNOSIS — R73.9 HYPERGLYCEMIA: ICD-10-CM

## 2024-07-30 DIAGNOSIS — R10.811 RIGHT UPPER QUADRANT ABDOMINAL TENDERNESS WITHOUT REBOUND TENDERNESS: ICD-10-CM

## 2024-07-30 DIAGNOSIS — R10.10 UPPER ABDOMINAL PAIN: Primary | ICD-10-CM

## 2024-07-30 DIAGNOSIS — R11.0 NAUSEA: ICD-10-CM

## 2024-07-30 DIAGNOSIS — F32.9 MAJOR DEPRESSIVE DISORDER, REMISSION STATUS UNSPECIFIED, UNSPECIFIED WHETHER RECURRENT: ICD-10-CM

## 2024-07-30 DIAGNOSIS — F17.299 OTHER TOBACCO PRODUCT NICOTINE DEPENDENCE WITH NICOTINE-INDUCED DISORDER: ICD-10-CM

## 2024-07-30 DIAGNOSIS — E66.9 CLASS 1 OBESITY WITH SERIOUS COMORBIDITY AND BODY MASS INDEX (BMI) OF 32.0 TO 32.9 IN ADULT, UNSPECIFIED OBESITY TYPE: ICD-10-CM

## 2024-07-30 RX ORDER — ONDANSETRON 8 MG/1
8 TABLET, ORALLY DISINTEGRATING ORAL EVERY 8 HOURS PRN
Qty: 20 TABLET | Refills: 0 | Status: SHIPPED | OUTPATIENT
Start: 2024-07-30 | End: 2024-08-06

## 2024-07-30 RX ORDER — ALUMINUM HYDROXIDE, MAGNESIUM HYDROXIDE, AND SIMETHICONE 2400; 240; 2400 MG/30ML; MG/30ML; MG/30ML
20 SUSPENSION ORAL EVERY 6 HOURS PRN
Qty: 355 ML | Refills: 0 | Status: SHIPPED | OUTPATIENT
Start: 2024-07-30 | End: 2024-08-09

## 2024-07-30 RX ORDER — PHENTERMINE HYDROCHLORIDE 37.5 MG/1
37.5 TABLET ORAL
Qty: 30 TABLET | Refills: 0 | Status: SHIPPED | OUTPATIENT
Start: 2024-07-30

## 2024-07-30 RX ORDER — VARENICLINE TARTRATE 1 MG/1
1 TABLET, FILM COATED ORAL 2 TIMES DAILY
Qty: 60 TABLET | Refills: 2 | Status: SHIPPED | OUTPATIENT
Start: 2024-07-30 | End: 2024-10-28

## 2024-07-30 RX ORDER — SERTRALINE HYDROCHLORIDE 100 MG/1
100 TABLET, FILM COATED ORAL DAILY
Qty: 90 TABLET | Refills: 1 | Status: SHIPPED | OUTPATIENT
Start: 2024-07-30

## 2024-07-30 ASSESSMENT — PATIENT HEALTH QUESTIONNAIRE - PHQ9
SUM OF ALL RESPONSES TO PHQ9 QUESTIONS 1 AND 2: 0
1. LITTLE INTEREST OR PLEASURE IN DOING THINGS: NOT AT ALL
1. LITTLE INTEREST OR PLEASURE IN DOING THINGS: NOT AT ALL
SUM OF ALL RESPONSES TO PHQ9 QUESTIONS 1 AND 2: 0
2. FEELING DOWN, DEPRESSED OR HOPELESS: NOT AT ALL
2. FEELING DOWN, DEPRESSED OR HOPELESS: NOT AT ALL

## 2024-07-30 ASSESSMENT — ENCOUNTER SYMPTOMS
OCCASIONAL FEELINGS OF UNSTEADINESS: 0
LOSS OF SENSATION IN FEET: 0
DEPRESSION: 0

## 2024-07-30 NOTE — PROGRESS NOTES
Subjective   Patient ID: Violette Dee is a 34 y.o. female who is with chief complaint of pain on the right upper quadrant and on the epigastric areas of the abdomen.    HPI  Patient is a 34 y.o. female who CONSULTED AT [facility] today. Patient is with complaint of pain on the right upper quadrant and on the epigastric areas of the abdomen. Patient states condition started about 1 month ago, on and off. Patient states that 2 days ago, he had diarrhea - 2-5x/day for 2 days. She described the abdominal pain as located on the right upper quadrant and the epigastric areas, can be dull sharp or burning, wax and wane but always present, 4-9/10, not relieved by food intake but actually precipitated by food intake, radiating to left shoulder. Patient also has nausea but no vomiting. Patient denies any urinary symptoms. There was no blood in the stools nor any black tarry material. She states the pain was not accompanied by fever nor chills.     Review of Systems  General: no weight loss, generally healthy, no fatigue  Head:  no headaches / sinus pain, no vertigo, no injury  Eyes: no diplopia, no tearing, no pain,   Ears: no change in hearing, no tinnitus, no bleeding, no vertigo  Mouth:  no dental difficulties, no gingival bleeding, no sore throat, no loss of sense of taste  Nose: no congestion, no  discharge, no bleeding, no obstruction, no loss of sense of smell  Neck: no stiffness, no pain, no tenderness, no masses, no bruit  Pulmonary: no dyspnea, no wheezing, no hemoptysis, no cough  Cardiovascular: no chest pain, no palpitations, no syncope, no orthopnea  Gastrointestinal: (+) decreased appetite, no dysphagia, (+) abdominal pains, (+) hx diarrhea, (+) nausea, no emesis, no melena  Genito Urinary: no dysuria, no urinary urgency, no nocturia, no incontinence, no change in nature of urine  Musculoskeletal: no muscle ache, no joint pain, no limitation of range of motion, no paresthesia, no  numbness  Constitutional: no fever, no chills, no night sweats    Objective   Physical Exam  General: ambulatory, in no acute distress  Head: normocephalic, no lesions  Eyes: pink palpebral conjunctiva, anicteric sclerae, PERRLA, EOM's full  Abdomen: flat, NABS, soft, (+) direct tenderness on the right upper quadrant and epigastric areas, no rebound tenderness, no mass palpated, SIGNS: (?) [place], no Rovsings, no Psoas, no Obturator; No CVA tenderness,  Musculoskeletal: no limitation of range of motion, no paralysis, no deformity  Extremities: full and equal peripheral pulses, no edema,    Assessment/Plan   Problem List Items Addressed This Visit             ICD-10-CM    Obesity E66.9     Other Visit Diagnoses         Codes    Upper abdominal pain    -  Primary R10.10    Relevant Medications    alum-mag hydroxide-simeth (Maalox Maximum Strength) 400-400-40 mg/5 mL suspension    Other Relevant Orders    US abdomen limited liver    Right upper quadrant abdominal tenderness without rebound tenderness     R10.811    Relevant Orders    US abdomen limited liver    Nausea     R11.0    Relevant Medications    ondansetron ODT (Zofran-ODT) 8 mg disintegrating tablet    BMI 32.0-32.9,adult     Z68.32        patient for ultrasound of abdomen   patient assisted by  staff with regards to when and where to get the ultrasound done.  will call patient with official reading from the radiologist  patient verbalized understanding    DISCHARGE SUMMARY:   Probable diagnosis, differential diagnosis, treatment, treatment options, and probable complications were discussed and explained to patient. Patient to take medication/s associated with this visit. she was advised small frequent feedings instead of big meals. Advised to avoid fatty and oily food, caffeine containing foods/drinks, alcoholic beverages, tobacco products, and spicy food. Advised to avoid missing meals and too much fluids during main meals. Patient advised to  avoid reclining immediately after meals and also educated on other preventive measures to avoid symptoms. Patient to return to clinic if there is worsening or persistence of symptoms. Patient verbalized understanding.    Patient to come back in 7 - 10 days if needed for worsening symptoms.         REINA Enriquez-CNP 07/30/24 2:45 PM

## 2024-07-31 ENCOUNTER — HOSPITAL ENCOUNTER (OUTPATIENT)
Dept: RADIOLOGY | Facility: HOSPITAL | Age: 34
Discharge: HOME | End: 2024-07-31
Payer: COMMERCIAL

## 2024-07-31 ENCOUNTER — SPECIALTY PHARMACY (OUTPATIENT)
Dept: PHARMACY | Facility: CLINIC | Age: 34
End: 2024-07-31

## 2024-07-31 DIAGNOSIS — R10.811 RIGHT UPPER QUADRANT ABDOMINAL TENDERNESS WITHOUT REBOUND TENDERNESS: ICD-10-CM

## 2024-07-31 DIAGNOSIS — R10.10 UPPER ABDOMINAL PAIN: ICD-10-CM

## 2024-07-31 PROCEDURE — RXMED WILLOW AMBULATORY MEDICATION CHARGE

## 2024-07-31 NOTE — PATIENT INSTRUCTIONS
DISCHARGE SUMMARY:   Probable diagnosis, differential diagnosis, treatment, treatment options, and probable complications were discussed and explained to patient. Patient to take medication/s associated with this visit. she was advised small frequent feedings instead of big meals. Advised to avoid fatty and oily food, caffeine containing foods/drinks, alcoholic beverages, tobacco products, and spicy food. Advised to avoid missing meals and too much fluids during main meals. Patient advised to avoid reclining immediately after meals and also educated on other preventive measures to avoid symptoms. Patient to return to clinic if there is worsening or persistence of symptoms. Patient verbalized understanding.    Patient to come back in 7 - 10 days if needed for worsening symptoms.

## 2024-08-01 ENCOUNTER — PHARMACY VISIT (OUTPATIENT)
Dept: PHARMACY | Facility: CLINIC | Age: 34
End: 2024-08-01
Payer: MEDICAID

## 2024-08-01 ENCOUNTER — APPOINTMENT (OUTPATIENT)
Dept: RADIOLOGY | Facility: HOSPITAL | Age: 34
End: 2024-08-01
Payer: COMMERCIAL

## 2024-08-02 ENCOUNTER — DOCUMENTATION (OUTPATIENT)
Dept: PRIMARY CARE | Facility: CLINIC | Age: 34
End: 2024-08-02
Payer: COMMERCIAL

## 2024-08-05 ENCOUNTER — APPOINTMENT (OUTPATIENT)
Dept: NEUROLOGY | Facility: CLINIC | Age: 34
End: 2024-08-05
Payer: COMMERCIAL

## 2024-08-05 DIAGNOSIS — G43.709 CHRONIC MIGRAINE WITHOUT AURA WITHOUT STATUS MIGRAINOSUS, NOT INTRACTABLE: Primary | ICD-10-CM

## 2024-08-05 NOTE — PROGRESS NOTES
Neurology Botulinum Injection    Subjective   Violette Dee is an 34 y.o. female here for medical botulinum injection for Chronic migraine without aura without status migrainosus, not intractable [G43.709].     Headaches gradually increased from 10/30, to 15/30, then 30/30 HA days per month over the last 3 months. Started working again last month and has wedding coming up. Continues to tolerate Qulipta without side effect. No side effect to Botox.     Botox    Date/Time: 8/5/2024 2:10 PM    Performed by: Jeremi Blankenship DO  Authorized by: Jeremi Blankenship DO    Procedure specific details:      Procedure Note: PREEMPT Botox    Indications: Chronic Migraine    Informed consent was obtained (explaining the procedure and risks and benefits of procedure) from patient: the signed consent form was placed in the medical record.  A time out was completed, verifying correct patient, procedure,site, positioning, and implants or special equipment.    200 units of OnabotulinumtoxinA were reconstituted with saline. Sites of injection were identified via PREEMPT protocol and cleaned with alcohol pads. Using a 30 gauge needle, patient received following injections:    5 units in procerus  5 units in each left and right   10 units divided between 2 sites of L frontalis  10 units divided between 2 sites of R frontalis  20 units divided between 4 sites of L temporalis  20 units divided between 4 sites of R temporalis  15 units divided between 3 sites of L occipitalis  15 units divided between 3 sites of R occipitalis  10 units divided between 2 sites of L paracervical muscles  10 units divided between 2 sites of R paracervical muscles  15 units divided between 3 sites of L trapezius  15 units divided between 3 sites of R trapezius    Additional Sites:  10u L temporalis  10u R temporalis    Used: 175u  Wasted: 25u    There were no complications. Patient was comfortable and left without complaint.     OnabotulinumtoxinA  Lot  #: [id number]  Exp: 12/2026

## 2024-08-12 ENCOUNTER — SPECIALTY PHARMACY (OUTPATIENT)
Dept: PHARMACY | Facility: CLINIC | Age: 34
End: 2024-08-12

## 2024-08-12 PROCEDURE — RXMED WILLOW AMBULATORY MEDICATION CHARGE

## 2024-08-13 ENCOUNTER — TELEPHONE (OUTPATIENT)
Dept: PRIMARY CARE | Facility: CLINIC | Age: 34
End: 2024-08-13
Payer: COMMERCIAL

## 2024-08-14 ENCOUNTER — OFFICE VISIT (OUTPATIENT)
Dept: PRIMARY CARE | Facility: CLINIC | Age: 34
End: 2024-08-14
Payer: COMMERCIAL

## 2024-08-14 ENCOUNTER — PHARMACY VISIT (OUTPATIENT)
Dept: PHARMACY | Facility: CLINIC | Age: 34
End: 2024-08-14
Payer: MEDICAID

## 2024-08-14 VITALS
RESPIRATION RATE: 16 BRPM | SYSTOLIC BLOOD PRESSURE: 100 MMHG | BODY MASS INDEX: 32.2 KG/M2 | WEIGHT: 175 LBS | OXYGEN SATURATION: 99 % | HEART RATE: 92 BPM | HEIGHT: 62 IN | DIASTOLIC BLOOD PRESSURE: 60 MMHG | TEMPERATURE: 98 F

## 2024-08-14 DIAGNOSIS — F41.9 ANXIETY: ICD-10-CM

## 2024-08-14 DIAGNOSIS — F40.243 FEAR OF FLYING: ICD-10-CM

## 2024-08-14 DIAGNOSIS — F32.9 MAJOR DEPRESSIVE DISORDER, REMISSION STATUS UNSPECIFIED, UNSPECIFIED WHETHER RECURRENT: ICD-10-CM

## 2024-08-14 DIAGNOSIS — F32.A DEPRESSION, UNSPECIFIED DEPRESSION TYPE: ICD-10-CM

## 2024-08-14 DIAGNOSIS — R53.83 OTHER FATIGUE: Primary | ICD-10-CM

## 2024-08-14 RX ORDER — SERTRALINE HYDROCHLORIDE 100 MG/1
150 TABLET, FILM COATED ORAL DAILY
Qty: 90 TABLET | Refills: 0 | Status: SHIPPED | OUTPATIENT
Start: 2024-08-14

## 2024-08-14 RX ORDER — LORAZEPAM 0.5 MG/1
0.5 TABLET ORAL 2 TIMES DAILY PRN
Qty: 10 TABLET | Refills: 0 | Status: SHIPPED | OUTPATIENT
Start: 2024-08-14

## 2024-08-14 ASSESSMENT — ENCOUNTER SYMPTOMS
DYSPHORIC MOOD: 0
CHOKING: 0
SINUS PAIN: 0
LIGHT-HEADEDNESS: 1
OCCASIONAL FEELINGS OF UNSTEADINESS: 0
WEAKNESS: 1
HEADACHES: 1
FATIGUE: 0
RESTLESSNESS: 1
PAIN: 1
WHEEZING: 0
SINUS PRESSURE: 0
SLEEP DISTURBANCE: 1
LOSS OF SENSATION IN FEET: 0
NERVOUS/ANXIOUS: 1
CHILLS: 0
SHORTNESS OF BREATH: 0
PALPITATIONS: 1
DIZZINESS: 1
DECREASED CONCENTRATION: 0
FEVER: 0
COUGH: 0
DEPRESSION: 0
SORE THROAT: 0

## 2024-08-14 NOTE — PROGRESS NOTES
Subjective   Patient ID: Violette Dee is a 34 y.o. female who presents for Dizziness, Pain, and Anxiety.    Dizziness  Episode onset: 2 weeks. The problem occurs intermittently. The problem has been gradually worsening. Associated symptoms include headaches and weakness. Pertinent negatives include no chest pain, chills, congestion, coughing, fatigue, fever or sore throat. The symptoms are aggravated by stress.   Pain  The current episode started today. Pain location: body ache. Associated symptoms include headaches and weakness. Pertinent negatives include no chest pain, fatigue, fever, shortness of breath or wheezing.   Anxiety  Presents for follow-up visit. Symptoms include dizziness, excessive worry, nervous/anxious behavior, palpitations and restlessness. Patient reports no chest pain, decreased concentration, shortness of breath or suicidal ideas. Symptoms occur constantly. The quality of sleep is poor.          34 year old female (PMH: Anxiety, depression, GERD, hyperglycemia, migraine headaches, vitamin B12 deficiency, vitamin D deficiency) presents today complaining of worsening anxiety symptoms over the past 2 weeks. She states that she has been feeling fatigued/weak. At times, she feels that she is going to pass out. She does admit to some increased stress. She started a new job 8 months ago, and is getting  in 45 days. She has not been sleeping well at night. She is having trouble shutting her mind off at night. She has been having panic attacks at night. Dr. Montenegro did give her Lorazepam 4 months ago. She has taken a few of them and did feel that it helped her. She denies any feelings of depression. No SI/HI.       Review of Systems   Constitutional:  Negative for chills, fatigue and fever.   HENT:  Negative for congestion, sinus pressure, sinus pain and sore throat.    Respiratory:  Negative for cough, choking, shortness of breath and wheezing.    Cardiovascular:  Positive for  "palpitations. Negative for chest pain.   Neurological:  Positive for dizziness, weakness, light-headedness and headaches.   Psychiatric/Behavioral:  Positive for sleep disturbance. Negative for decreased concentration, dysphoric mood and suicidal ideas. The patient is nervous/anxious.        Objective   /60 (BP Location: Left arm, Patient Position: Sitting, BP Cuff Size: Adult)   Pulse 92   Temp 36.7 °C (98 °F) (Temporal)   Resp 16   Ht 1.575 m (5' 2\")   Wt 79.4 kg (175 lb)   SpO2 99%   BMI 32.01 kg/m²     Physical Exam  Vitals and nursing note reviewed.   Constitutional:       General: She is not in acute distress.     Appearance: Normal appearance.   Cardiovascular:      Rate and Rhythm: Normal rate and regular rhythm.      Heart sounds: Normal heart sounds.   Pulmonary:      Effort: Pulmonary effort is normal.      Breath sounds: Normal breath sounds.   Skin:     General: Skin is warm and dry.   Neurological:      Mental Status: She is alert.   Psychiatric:         Mood and Affect: Mood normal.         Behavior: Behavior normal.         Thought Content: Thought content normal.         Assessment/Plan   Problem List Items Addressed This Visit             ICD-10-CM    Anxiety F41.9    Depression F32.A    Relevant Medications    sertraline (Zoloft) 100 mg tablet    Other Relevant Orders    Comprehensive Metabolic Panel    CBC and Auto Differential    Tsh With Reflex To Free T4 If Abnormal     Other Visit Diagnoses         Codes    Other fatigue    -  Primary R53.83    Relevant Orders    Hemoglobin A1c    Vitamin D 25-Hydroxy,Total (for eval of Vitamin D levels)    Vitamin B12          Check labs.   Increase Zoloft to 150 mg daily.   Continue with other medications for chronic health conditions.   Follow up in 1 month for recheck, or sooner with any additional concerns.      "

## 2024-08-16 ENCOUNTER — DOCUMENTATION (OUTPATIENT)
Dept: PRIMARY CARE | Facility: CLINIC | Age: 34
End: 2024-08-16

## 2024-08-16 ENCOUNTER — LAB (OUTPATIENT)
Dept: LAB | Facility: LAB | Age: 34
End: 2024-08-16
Payer: COMMERCIAL

## 2024-08-16 DIAGNOSIS — F32.9 MAJOR DEPRESSIVE DISORDER, REMISSION STATUS UNSPECIFIED, UNSPECIFIED WHETHER RECURRENT: ICD-10-CM

## 2024-08-16 DIAGNOSIS — R53.83 OTHER FATIGUE: ICD-10-CM

## 2024-08-16 LAB
25(OH)D3 SERPL-MCNC: 30 NG/ML (ref 30–100)
ALBUMIN SERPL BCP-MCNC: 4.2 G/DL (ref 3.4–5)
ALP SERPL-CCNC: 87 U/L (ref 33–110)
ALT SERPL W P-5'-P-CCNC: 18 U/L (ref 7–45)
ANION GAP SERPL CALC-SCNC: 11 MMOL/L (ref 10–20)
AST SERPL W P-5'-P-CCNC: 19 U/L (ref 9–39)
BASOPHILS # BLD AUTO: 0.06 X10*3/UL (ref 0–0.1)
BASOPHILS NFR BLD AUTO: 1 %
BILIRUB SERPL-MCNC: 0.4 MG/DL (ref 0–1.2)
BUN SERPL-MCNC: 9 MG/DL (ref 6–23)
CALCIUM SERPL-MCNC: 9.2 MG/DL (ref 8.6–10.3)
CHLORIDE SERPL-SCNC: 103 MMOL/L (ref 98–107)
CO2 SERPL-SCNC: 27 MMOL/L (ref 21–32)
CREAT SERPL-MCNC: 0.7 MG/DL (ref 0.5–1.05)
EGFRCR SERPLBLD CKD-EPI 2021: >90 ML/MIN/1.73M*2
EOSINOPHIL # BLD AUTO: 0.09 X10*3/UL (ref 0–0.7)
EOSINOPHIL NFR BLD AUTO: 1.5 %
ERYTHROCYTE [DISTWIDTH] IN BLOOD BY AUTOMATED COUNT: 12.5 % (ref 11.5–14.5)
EST. AVERAGE GLUCOSE BLD GHB EST-MCNC: 88 MG/DL
GLUCOSE SERPL-MCNC: 77 MG/DL (ref 74–99)
HBA1C MFR BLD: 4.7 %
HCT VFR BLD AUTO: 41.1 % (ref 36–46)
HGB BLD-MCNC: 13.6 G/DL (ref 12–16)
IMM GRANULOCYTES # BLD AUTO: 0.01 X10*3/UL (ref 0–0.7)
IMM GRANULOCYTES NFR BLD AUTO: 0.2 % (ref 0–0.9)
LYMPHOCYTES # BLD AUTO: 1.61 X10*3/UL (ref 1.2–4.8)
LYMPHOCYTES NFR BLD AUTO: 26.3 %
MCH RBC QN AUTO: 29.6 PG (ref 26–34)
MCHC RBC AUTO-ENTMCNC: 33.1 G/DL (ref 32–36)
MCV RBC AUTO: 89 FL (ref 80–100)
MONOCYTES # BLD AUTO: 0.57 X10*3/UL (ref 0.1–1)
MONOCYTES NFR BLD AUTO: 9.3 %
NEUTROPHILS # BLD AUTO: 3.79 X10*3/UL (ref 1.2–7.7)
NEUTROPHILS NFR BLD AUTO: 61.7 %
NRBC BLD-RTO: 0 /100 WBCS (ref 0–0)
PLATELET # BLD AUTO: 221 X10*3/UL (ref 150–450)
POTASSIUM SERPL-SCNC: 4.4 MMOL/L (ref 3.5–5.3)
PROT SERPL-MCNC: 6.5 G/DL (ref 6.4–8.2)
RBC # BLD AUTO: 4.6 X10*6/UL (ref 4–5.2)
SODIUM SERPL-SCNC: 137 MMOL/L (ref 136–145)
TSH SERPL-ACNC: 1.45 MIU/L (ref 0.44–3.98)
VIT B12 SERPL-MCNC: 442 PG/ML (ref 211–911)
WBC # BLD AUTO: 6.1 X10*3/UL (ref 4.4–11.3)

## 2024-08-16 PROCEDURE — 82607 VITAMIN B-12: CPT

## 2024-08-16 PROCEDURE — 84443 ASSAY THYROID STIM HORMONE: CPT

## 2024-08-16 PROCEDURE — 80053 COMPREHEN METABOLIC PANEL: CPT

## 2024-08-16 PROCEDURE — 82306 VITAMIN D 25 HYDROXY: CPT

## 2024-08-16 PROCEDURE — 83036 HEMOGLOBIN GLYCOSYLATED A1C: CPT

## 2024-08-16 PROCEDURE — 85025 COMPLETE CBC W/AUTO DIFF WBC: CPT

## 2024-08-16 NOTE — PROGRESS NOTES
JE patient     Patient contacted office to obtain sample of Ozempic. Patient informed sample available for .

## 2024-08-16 NOTE — LETTER
August 19, 2024     Violette J Luiz  [address] [facility] [address]      Dear Ms. Dee:    Below are the results from your recent visit:    LAB RESULTS ARE WITHIN NORMAL LIMITS     Resulted Orders   Comprehensive Metabolic Panel   Result Value Ref Range    Glucose 77 74 - 99 mg/dL    Sodium 137 136 - 145 mmol/L    Potassium 4.4 3.5 - 5.3 mmol/L    Chloride 103 98 - 107 mmol/L    Bicarbonate 27 21 - 32 mmol/L    Anion Gap 11 10 - 20 mmol/L    Urea Nitrogen 9 6 - 23 mg/dL    Creatinine 0.70 0.50 - 1.05 mg/dL    eGFR >90 >60 mL/min/1.73m*2      Comment:      Calculations of estimated GFR are performed using the 2021 CKD-EPI Study Refit equation without the race variable for the IDMS-Traceable creatinine methods.  https://jasn.asnjournals.org/content/early/2021/09/22/ASN.6574649793    Calcium 9.2 8.6 - 10.3 mg/dL    Albumin 4.2 3.4 - 5.0 g/dL    Alkaline Phosphatase 87 33 - 110 U/L    Total Protein 6.5 6.4 - 8.2 g/dL    AST 19 9 - 39 U/L    Bilirubin, Total 0.4 0.0 - 1.2 mg/dL    ALT 18 7 - 45 U/L      Comment:      Patients treated with Sulfasalazine may generate falsely decreased results for ALT.   CBC and Auto Differential   Result Value Ref Range    WBC 6.1 4.4 - 11.3 x10*3/uL    nRBC 0.0 0.0 - 0.0 /100 WBCs    RBC 4.60 4.00 - 5.20 x10*6/uL    Hemoglobin 13.6 12.0 - 16.0 g/dL    Hematocrit 41.1 36.0 - 46.0 %    MCV 89 80 - 100 fL    MCH 29.6 26.0 - 34.0 pg    MCHC 33.1 32.0 - 36.0 g/dL    RDW 12.5 11.5 - 14.5 %    Platelets 221 150 - 450 x10*3/uL    Neutrophils % 61.7 40.0 - 80.0 %    Immature Granulocytes %, Automated 0.2 0.0 - 0.9 %      Comment:      Immature Granulocyte Count (IG) includes promyelocytes, myelocytes and metamyelocytes but does not include bands. Percent differential counts (%) should be interpreted in the context of the absolute cell counts (cells/UL).    Lymphocytes % 26.3 13.0 - 44.0 %    Monocytes % 9.3 2.0 - 10.0 %    Eosinophils % 1.5 0.0 - 6.0 %    Basophils % 1.0 0.0 - 2.0 %     Neutrophils Absolute 3.79 1.20 - 7.70 x10*3/uL      Comment:      Percent differential counts (%) should be interpreted in the context of the absolute cell counts (cells/uL).    Immature Granulocytes Absolute, Automated 0.01 0.00 - 0.70 x10*3/uL    Lymphocytes Absolute 1.61 1.20 - 4.80 x10*3/uL    Monocytes Absolute 0.57 0.10 - 1.00 x10*3/uL    Eosinophils Absolute 0.09 0.00 - 0.70 x10*3/uL    Basophils Absolute 0.06 0.00 - 0.10 x10*3/uL   Hemoglobin A1c   Result Value Ref Range    Hemoglobin A1C 4.7 see below %    Estimated Average Glucose 88 Not Established mg/dL    Narrative    Diagnosis of Diabetes-Adults  Non-Diabetic: < or = 5.6%  Increased risk for developing diabetes: 5.7-6.4%  Diagnostic of diabetes: > or = 6.5%       Tsh With Reflex To Free T4 If Abnormal   Result Value Ref Range    Thyroid Stimulating Hormone 1.45 0.44 - 3.98 mIU/L    Narrative    TSH testing is performed using different testing methodology at [facility] than at other [facility]. Direct result comparisons should only be made within the same method.     Vitamin D 25-Hydroxy,Total (for eval of Vitamin D levels)   Result Value Ref Range    Vitamin D, 25-Hydroxy, Total 30 30 - 100 ng/mL    Narrative    Deficiency:         < 20   ng/ml  Insufficiency:      20-29  ng/ml  Sufficiency:         ng/ml  This assay accurately quantifies the sum of Vitamin D3, 25-Hydroxy and Vitamin D2,25-Hydroxy.   Vitamin B12   Result Value Ref Range    Vitamin B12 442 211 - 911 pg/mL     The test results show that your current treatment is working. Please continue your current medication and plan. If you have any questions or concerns, please don't hesitate to call.         Sincerely,        [facility] TECH

## 2024-08-19 ENCOUNTER — TELEPHONE (OUTPATIENT)
Dept: PRIMARY CARE | Facility: CLINIC | Age: 34
End: 2024-08-19
Payer: COMMERCIAL

## 2024-08-19 NOTE — PROGRESS NOTES
Writer outreached pt to schedule follow up appointment. Follow up scheduled for 8/29/2024 @ 10am via telehealth.

## 2024-08-28 ENCOUNTER — HOSPITAL ENCOUNTER (EMERGENCY)
Facility: HOSPITAL | Age: 34
Discharge: HOME | End: 2024-08-28
Payer: COMMERCIAL

## 2024-08-28 VITALS
DIASTOLIC BLOOD PRESSURE: 63 MMHG | WEIGHT: 170 LBS | SYSTOLIC BLOOD PRESSURE: 125 MMHG | TEMPERATURE: 97.3 F | HEART RATE: 91 BPM | OXYGEN SATURATION: 100 % | RESPIRATION RATE: 20 BRPM | BODY MASS INDEX: 31.28 KG/M2 | HEIGHT: 62 IN

## 2024-08-28 DIAGNOSIS — R53.82 CHRONIC FATIGUE: Primary | ICD-10-CM

## 2024-08-28 LAB
ALBUMIN SERPL BCP-MCNC: 4.3 G/DL (ref 3.4–5)
ALP SERPL-CCNC: 85 U/L (ref 33–110)
ALT SERPL W P-5'-P-CCNC: 19 U/L (ref 7–45)
ANION GAP SERPL CALC-SCNC: 14 MMOL/L (ref 10–20)
APPEARANCE UR: CLEAR
AST SERPL W P-5'-P-CCNC: 20 U/L (ref 9–39)
B-HCG SERPL-ACNC: <2 MIU/ML
BASOPHILS # BLD AUTO: 0.03 X10*3/UL (ref 0–0.1)
BASOPHILS NFR BLD AUTO: 0.3 %
BILIRUB SERPL-MCNC: 0.4 MG/DL (ref 0–1.2)
BILIRUB UR STRIP.AUTO-MCNC: NEGATIVE MG/DL
BUN SERPL-MCNC: 11 MG/DL (ref 6–23)
CALCIUM SERPL-MCNC: 9.2 MG/DL (ref 8.6–10.3)
CHLORIDE SERPL-SCNC: 104 MMOL/L (ref 98–107)
CO2 SERPL-SCNC: 23 MMOL/L (ref 21–32)
COLOR UR: COLORLESS
CREAT SERPL-MCNC: 0.71 MG/DL (ref 0.5–1.05)
EGFRCR SERPLBLD CKD-EPI 2021: >90 ML/MIN/1.73M*2
EOSINOPHIL # BLD AUTO: 0.04 X10*3/UL (ref 0–0.7)
EOSINOPHIL NFR BLD AUTO: 0.4 %
ERYTHROCYTE [DISTWIDTH] IN BLOOD BY AUTOMATED COUNT: 12.2 % (ref 11.5–14.5)
GLUCOSE SERPL-MCNC: 82 MG/DL (ref 74–99)
GLUCOSE UR STRIP.AUTO-MCNC: NORMAL MG/DL
HCT VFR BLD AUTO: 42 % (ref 36–46)
HGB BLD-MCNC: 13.9 G/DL (ref 12–16)
HOLD SPECIMEN: NORMAL
IMM GRANULOCYTES # BLD AUTO: 0.03 X10*3/UL (ref 0–0.7)
IMM GRANULOCYTES NFR BLD AUTO: 0.3 % (ref 0–0.9)
KETONES UR STRIP.AUTO-MCNC: NEGATIVE MG/DL
LEUKOCYTE ESTERASE UR QL STRIP.AUTO: NEGATIVE
LYMPHOCYTES # BLD AUTO: 1.49 X10*3/UL (ref 1.2–4.8)
LYMPHOCYTES NFR BLD AUTO: 15.6 %
MAGNESIUM SERPL-MCNC: 1.93 MG/DL (ref 1.6–2.4)
MCH RBC QN AUTO: 29.6 PG (ref 26–34)
MCHC RBC AUTO-ENTMCNC: 33.1 G/DL (ref 32–36)
MCV RBC AUTO: 89 FL (ref 80–100)
MONOCYTES # BLD AUTO: 0.49 X10*3/UL (ref 0.1–1)
MONOCYTES NFR BLD AUTO: 5.1 %
NEUTROPHILS # BLD AUTO: 7.49 X10*3/UL (ref 1.2–7.7)
NEUTROPHILS NFR BLD AUTO: 78.3 %
NITRITE UR QL STRIP.AUTO: NEGATIVE
NRBC BLD-RTO: 0 /100 WBCS (ref 0–0)
PH UR STRIP.AUTO: 5.5 [PH]
PLATELET # BLD AUTO: 206 X10*3/UL (ref 150–450)
POTASSIUM SERPL-SCNC: 4 MMOL/L (ref 3.5–5.3)
PROT SERPL-MCNC: 7.1 G/DL (ref 6.4–8.2)
PROT UR STRIP.AUTO-MCNC: NEGATIVE MG/DL
RBC # BLD AUTO: 4.7 X10*6/UL (ref 4–5.2)
RBC # UR STRIP.AUTO: ABNORMAL /UL
RBC #/AREA URNS AUTO: NORMAL /HPF
SODIUM SERPL-SCNC: 137 MMOL/L (ref 136–145)
SP GR UR STRIP.AUTO: 1
SQUAMOUS #/AREA URNS AUTO: NORMAL /HPF
UROBILINOGEN UR STRIP.AUTO-MCNC: NORMAL MG/DL
WBC # BLD AUTO: 9.6 X10*3/UL (ref 4.4–11.3)
WBC #/AREA URNS AUTO: NORMAL /HPF

## 2024-08-28 PROCEDURE — 2500000004 HC RX 250 GENERAL PHARMACY W/ HCPCS (ALT 636 FOR OP/ED): Performed by: PHYSICIAN ASSISTANT

## 2024-08-28 PROCEDURE — 96360 HYDRATION IV INFUSION INIT: CPT

## 2024-08-28 PROCEDURE — 84702 CHORIONIC GONADOTROPIN TEST: CPT | Performed by: PHYSICIAN ASSISTANT

## 2024-08-28 PROCEDURE — 83735 ASSAY OF MAGNESIUM: CPT | Performed by: PHYSICIAN ASSISTANT

## 2024-08-28 PROCEDURE — 85025 COMPLETE CBC W/AUTO DIFF WBC: CPT | Performed by: PHYSICIAN ASSISTANT

## 2024-08-28 PROCEDURE — 80053 COMPREHEN METABOLIC PANEL: CPT | Performed by: PHYSICIAN ASSISTANT

## 2024-08-28 PROCEDURE — 81001 URINALYSIS AUTO W/SCOPE: CPT | Performed by: PHYSICIAN ASSISTANT

## 2024-08-28 PROCEDURE — 36415 COLL VENOUS BLD VENIPUNCTURE: CPT | Performed by: PHYSICIAN ASSISTANT

## 2024-08-28 PROCEDURE — 99283 EMERGENCY DEPT VISIT LOW MDM: CPT

## 2024-08-28 ASSESSMENT — COLUMBIA-SUICIDE SEVERITY RATING SCALE - C-SSRS
2. HAVE YOU ACTUALLY HAD ANY THOUGHTS OF KILLING YOURSELF?: NO
1. IN THE PAST MONTH, HAVE YOU WISHED YOU WERE DEAD OR WISHED YOU COULD GO TO SLEEP AND NOT WAKE UP?: NO
6. HAVE YOU EVER DONE ANYTHING, STARTED TO DO ANYTHING, OR PREPARED TO DO ANYTHING TO END YOUR LIFE?: NO

## 2024-08-28 ASSESSMENT — PAIN SCALES - GENERAL: PAINLEVEL_OUTOF10: 0 - NO PAIN

## 2024-08-28 ASSESSMENT — LIFESTYLE VARIABLES
HAVE YOU EVER FELT YOU SHOULD CUT DOWN ON YOUR DRINKING: NO
EVER HAD A DRINK FIRST THING IN THE MORNING TO STEADY YOUR NERVES TO GET RID OF A HANGOVER: NO
EVER FELT BAD OR GUILTY ABOUT YOUR DRINKING: NO
HAVE PEOPLE ANNOYED YOU BY CRITICIZING YOUR DRINKING: NO
TOTAL SCORE: 0

## 2024-08-28 ASSESSMENT — PAIN - FUNCTIONAL ASSESSMENT: PAIN_FUNCTIONAL_ASSESSMENT: 0-10

## 2024-08-28 NOTE — ED PROVIDER NOTES
HPI   Chief Complaint   Patient presents with    Dizziness     States she has been feeling weak and lightheaded at times.         34-year-old female presents to the emergency department for multiple complaints.  Patient states she has been feeling fatigued, weak and lightheaded for the last 5 or 6 months.  She saw her primary doctor a few weeks ago and had outpatient blood work including a CBC, TSH, A1c and vitamin D which were all normal.  She states that she takes medication for anxiety and 4 to 5 months ago she started both Adipex and Ozempic.  She denies chest pain, shortness of breath, syncope, sensorimotor deficits, urinary symptoms, concern for pregnancy, abdominal pain.  She states she was at work today and was feeling lightheaded and took her blood pressure and it was 100/60 which is low for her and her heart rate was 103.  She states she has had an energy drink this morning and a granola bar.              Patient History   Past Medical History:   Diagnosis Date    Bariatric surgery status 12/12/2019    S/P gastric bypass    Bariatric surgery status 12/12/2019    H/O bariatric surgery    Other specified health status     Known health problems: none    Personal history of other diseases of the respiratory system 01/05/2023    History of upper respiratory infection     Past Surgical History:   Procedure Laterality Date    OTHER SURGICAL HISTORY  12/12/2019    Ankle surgery    OTHER SURGICAL HISTORY  12/12/2019    Gastric bypass surgery     Family History   Problem Relation Name Age of Onset    Other (Dyslipidemia) Mother      Diabetes Mother      Other (Dyslipidemia) Father      Coronary artery disease Father       Social History     Tobacco Use    Smoking status: Former     Types: Cigarettes     Passive exposure: Current    Smokeless tobacco: Never   Vaping Use    Vaping status: Unknown   Substance Use Topics    Alcohol use: Not Currently     Comment: socially    Drug use: Never       Physical Exam   ED  Triage Vitals [08/28/24 0956]   Temperature Heart Rate Respirations BP   36.3 °C (97.3 °F) 91 20 125/63      Pulse Ox Temp Source Heart Rate Source Patient Position   100 % Temporal Monitor Sitting      BP Location FiO2 (%)     Right arm --       Physical Exam  Vitals and nursing note reviewed.   Constitutional:       General: She is not in acute distress.  HENT:      Head: Atraumatic.      Mouth/Throat:      Mouth: Mucous membranes are moist.      Pharynx: Oropharynx is clear.   Eyes:      Extraocular Movements: Extraocular movements intact.      Conjunctiva/sclera: Conjunctivae normal.      Pupils: Pupils are equal, round, and reactive to light.   Cardiovascular:      Rate and Rhythm: Normal rate and regular rhythm.      Pulses: Normal pulses.   Pulmonary:      Effort: Pulmonary effort is normal. No respiratory distress.      Breath sounds: Normal breath sounds.   Abdominal:      General: There is no distension.      Palpations: Abdomen is soft.      Tenderness: There is no abdominal tenderness. There is no guarding or rebound.   Musculoskeletal:         General: No deformity.      Cervical back: Neck supple.   Skin:     General: Skin is warm and dry.   Neurological:      Mental Status: She is alert and oriented to person, place, and time. Mental status is at baseline.      Cranial Nerves: No cranial nerve deficit.      Sensory: No sensory deficit.      Motor: No weakness.   Psychiatric:         Mood and Affect: Mood normal.         Behavior: Behavior normal.           ED Course & MDM   Diagnoses as of 08/28/24 1351   Chronic fatigue                 No data recorded     Elena Coma Scale Score: 15 (08/28/24 0956 : Fracisco Arias RN)                           Medical Decision Making  34-year-old female presents to the emergency department for multiple complaints x 6 months including fatigue, weakness and lightheadedness.  She had a full panel of blood work by her primary doctor this month which I reviewed and  her results are all within normal limits.  On my exam, heart and lungs are clear.  Abdomen is soft and nontender.  Neurologic exam is normal.  Her blood pressure is 116/73 and her heart rate is 83.  Oxygen saturation is 100% on room air.  Labs do not show a leukocytosis, left shift, anemia, metabolic disturbance, renal insufficiency, electrolyte abnormality, transaminitis.  Urinalysis is unremarkable.  Magnesium is normal.  Pregnancy test is negative.  Patient is hemodynamically stable here.  I discussed her results with her and recommended close follow-up with her primary care for further evaluation of her symptoms.  Discussed results with patient and/or family/friend and recommended close follow up with primary care or specialist.  Reviewed return precautions at length.  I answered all questions.          Procedure  Procedures     Awa Ariza PA-C  08/28/24 3518

## 2024-08-28 NOTE — Clinical Note
Violette Dee was seen and treated in our emergency department on 8/28/2024.  She may return to work on 08/29/2024.       If you have any questions or concerns, please don't hesitate to call.      Awa Ariza PA-C

## 2024-08-29 ENCOUNTER — SOCIAL WORK (OUTPATIENT)
Dept: PRIMARY CARE | Facility: CLINIC | Age: 34
End: 2024-08-29

## 2024-08-29 ENCOUNTER — DOCUMENTATION (OUTPATIENT)
Dept: BEHAVIORAL HEALTH | Facility: CLINIC | Age: 34
End: 2024-08-29

## 2024-08-29 ENCOUNTER — APPOINTMENT (OUTPATIENT)
Dept: PRIMARY CARE | Facility: CLINIC | Age: 34
End: 2024-08-29
Payer: COMMERCIAL

## 2024-08-29 ASSESSMENT — ANXIETY QUESTIONNAIRES
6. BECOMING EASILY ANNOYED OR IRRITABLE: NEARLY EVERY DAY
4. TROUBLE RELAXING: NEARLY EVERY DAY
2. NOT BEING ABLE TO STOP OR CONTROL WORRYING: NEARLY EVERY DAY
3. WORRYING TOO MUCH ABOUT DIFFERENT THINGS: NEARLY EVERY DAY
1. FEELING NERVOUS, ANXIOUS, OR ON EDGE: NEARLY EVERY DAY
IF YOU CHECKED OFF ANY PROBLEMS ON THIS QUESTIONNAIRE, HOW DIFFICULT HAVE THESE PROBLEMS MADE IT FOR YOU TO DO YOUR WORK, TAKE CARE OF THINGS AT HOME, OR GET ALONG WITH OTHER PEOPLE: EXTREMELY DIFFICULT
GAD7 TOTAL SCORE: 19
7. FEELING AFRAID AS IF SOMETHING AWFUL MIGHT HAPPEN: NEARLY EVERY DAY
5. BEING SO RESTLESS THAT IT IS HARD TO SIT STILL: SEVERAL DAYS

## 2024-08-29 ASSESSMENT — PATIENT HEALTH QUESTIONNAIRE - PHQ9
7. TROUBLE CONCENTRATING ON THINGS, SUCH AS READING THE NEWSPAPER OR WATCHING TELEVISION: NEARLY EVERY DAY
SUM OF ALL RESPONSES TO PHQ9 QUESTIONS 1 & 2: 1
8. MOVING OR SPEAKING SO SLOWLY THAT OTHER PEOPLE COULD HAVE NOTICED. OR THE OPPOSITE, BEING SO FIGETY OR RESTLESS THAT YOU HAVE BEEN MOVING AROUND A LOT MORE THAN USUAL: NOT AT ALL
1. LITTLE INTEREST OR PLEASURE IN DOING THINGS: SEVERAL DAYS
4. FEELING TIRED OR HAVING LITTLE ENERGY: NEARLY EVERY DAY
3. TROUBLE FALLING OR STAYING ASLEEP: NEARLY EVERY DAY
2. FEELING DOWN, DEPRESSED OR HOPELESS: NOT AT ALL
6. FEELING BAD ABOUT YOURSELF - OR THAT YOU ARE A FAILURE OR HAVE LET YOURSELF OR YOUR FAMILY DOWN: NOT AT ALL
9. THOUGHTS THAT YOU WOULD BE BETTER OFF DEAD, OR OF HURTING YOURSELF: NOT AT ALL
5. POOR APPETITE OR OVEREATING: NEARLY EVERY DAY
SUM OF ALL RESPONSES TO PHQ QUESTIONS 1-9: 13
10. IF YOU CHECKED OFF ANY PROBLEMS, HOW DIFFICULT HAVE THESE PROBLEMS MADE IT FOR YOU TO DO YOUR WORK, TAKE CARE OF THINGS AT HOME, OR GET ALONG WITH OTHER PEOPLE: SOMEWHAT DIFFICULT

## 2024-08-29 NOTE — PROGRESS NOTES
[facility] ([facility])  Progress Note    Type of Interaction: Phone    Start Time: 1004am    End Time: 1034am    Appointment: Scheduled    Reason for Visit:   Chief Complaint   Patient presents with    Anxiety    Follow up    Interval History / Patient Symptoms:     Patient Health Questionnaire-9 Score: 13 (8/29/2024 10:11 AM)  JOYCELYN-7 Total Score: 19 (8/29/2024 10:11 AM)    Interventions Provided: Psychoeducation, Problem Solving Treatment, Develop Coping Strategies, and Mindfulness    Progress Made: Minimum    Response to Intervention:   -Pt reports very stressful couple months. Pt reports expecting the worse out of everything. Pt stated inability to focus just on one thing and is all over the place.  -Pt is getting  on 9/28/2024 and is stretched very thin right now. Reports inability to sleep right now. Wakes up at 230-330 in the morning to clean before she goes to work.  -Pt reports depression is not as bad recently.   -Pt reports a hard time focusing and getting things accomplished.  - Writer provided psycho education on signs and symptoms of ADHD/ADD.  -Writer educated pt on ADHD resources through .   -Writer provided grounding techniques to assist with management of anxiety in specific, panic attacks.  -Writer to follow up with psychiatry today about med recommendations for ADHD/ADD sxs. In addition, writer will follow up if pt utilized grounding techniques suggest this session.     Plan: Pt to continue working on self care routine and communicating anger/frustration with her spouse and daughter as tensions rise in order to reduce anxiety and explosive episodes. Assess progress pt has made at meeting self-care needs and engaging in positive coping mechanisms at next appt.      Care Plan    There is no care plan documentation to display.       Patient Instructions   Follow up scheduled on 9/18/2024 at 10:00 AM via phone call.     Follow Up / Next Appointment: Next appointment: 09/19/24

## 2024-08-29 NOTE — PROGRESS NOTES
[facility] ([facility])  Progress Note    Type of Interaction: Care Coordination    Start Time: 105pm    End Time: 125pm    Appointment: Scheduled    Reason for Visit:   Chief Complaint   Patient presents with    Anxiety    Care Coordination with psych consult    Interval History / Patient Symptoms:     Patient Health Questionnaire-9 Score: 13 (8/29/2024 10:11 AM)  JOYCELYN-7 Total Score: 19 (8/29/2024 10:11 AM)    Interventions Provided: Treatment Planning    Progress Made: Minimum    Response to Intervention:   -Writer collaborated with consulting physician regarding steps to take for pt who is describing sxs of ADHD. Writer inquired if it is possible to recommend ADHD medication if not officially diagnosed currently or in the past. Consulting psych informed writer about  protocol for medication recommendations for adults utilizing BAARS-IV. Writer will follow up with pt to discuss assessing pt with BAARS-IV or recommending pt to ADHD clinic through . Next steps TBD at this time until next communication with pt. Writer emailed information to pt awaiting pt's follow up.     Plan: Pt to continue working on self care routine and communicating anger/frustration with her spouse and daughter as tensions rise in order to reduce anxiety and explosive episodes. Assess progress pt has made at meeting self-care needs and engaging in positive coping mechanisms at next appt.      Care Plan    There is no care plan documentation to display.       Patient Instructions   Follow up scheduled on 9/19/2024 @ 10am via telehealth.     Follow Up / Next Appointment: Next appointment: 09/19/24

## 2024-08-29 NOTE — PROGRESS NOTES
[facility] Psychiatry Consult Note     Violette Dee is a 34 y.o., referred to [facility] for symptoms of depression and focus and organization issues. I have reviewed the patient with the behavioral health manager and reviewed the patient's electronic record. Last discussed 4/11/24.  Pertinent highlights of discussion and chart review include the following:     Violette has been struggling with organization, seems overwhelmed with the work around her.  She continues on the Zoloft and Ativan.  Sleep has been difficult.    Recommendations:   - [facility] plans to do a BAARS-IV as part of ongoing work.  - Continue Zoloft 100mg daily for anxiety and mood.    - Continue Vraylar 1.5mg daily for mood/anxiety as ordered.  Augmentation for SSRI. Has been on several other medications for anxiety and mood previously.  - Patient will continue to follow with behavioral health case management.      The above treatment considerations and suggestions are based on consultations with the patient's care manager and a review of information available in the electronic medical record. I have not personally examined the patient. All recommendations should be implemented with consideration of the patient's relevant prior history and current clinical status. Please feel free to contact me with any questions about the care of this patient. I can be reached via the [facility] or Epic/[employer] messenger.

## 2024-08-30 ENCOUNTER — DOCUMENTATION (OUTPATIENT)
Dept: PRIMARY CARE | Facility: CLINIC | Age: 34
End: 2024-08-30
Payer: COMMERCIAL

## 2024-08-30 DIAGNOSIS — F41.9 ANXIETY: Primary | ICD-10-CM

## 2024-09-05 ENCOUNTER — OFFICE VISIT (OUTPATIENT)
Dept: NEUROLOGY | Facility: CLINIC | Age: 34
End: 2024-09-05
Payer: COMMERCIAL

## 2024-09-05 DIAGNOSIS — G43.711 CHRONIC MIGRAINE WITHOUT AURA, WITH INTRACTABLE MIGRAINE, SO STATED, WITH STATUS MIGRAINOSUS: Primary | ICD-10-CM

## 2024-09-05 RX ORDER — LIDOCAINE HYDROCHLORIDE 10 MG/ML
1.5 INJECTION INFILTRATION; PERINEURAL ONCE
Status: COMPLETED | OUTPATIENT
Start: 2024-09-05 | End: 2024-09-05

## 2024-09-05 RX ORDER — BUPIVACAINE HYDROCHLORIDE 5 MG/ML
1.5 INJECTION, SOLUTION EPIDURAL; INTRACAUDAL ONCE
Status: COMPLETED | OUTPATIENT
Start: 2024-09-05 | End: 2024-09-05

## 2024-09-05 NOTE — PROGRESS NOTES
Subjective   Patient has had a constant headache for the last 2-3 weeks. Fluctuating in severity. Felt in frontal, temporal, and occipital areas with tenderness to R occipital notch.    Nerve Block    Date/Time: 9/5/2024 10:57 AM    Performed by: Jeremi Blankenship DO  Authorized by: Jeremi Blankenship DO    Comments:      Procedure Note: Bilateral Greater Occipital Nerve Block    Indications: severe bilateral occipital pain    Informed consent was obtained (explaining the procedure and risks and benefits of procedure) from patient: the signed consent form was placed in the medical record.  A time out was completed, verifying correct patient, procedure,site, positioning, and implants or special equipment.    Patient's left occipital area was palpated to identify location of greater occipital nerve. Alcohol was applied topically to the skin. Using a 30 gauge needle (aspirating during insertion), 3cc of a 1:1 mixture of 1% lidocaine and 0.5% bupivacaine was injected on the left side. Pressure with a gauze pad was held briefly upon the site of puncture to minimize bleeding and to further spread anaesthetic subcutaneously. The procedure was repeated on the right side, injecting another 3cc on that side.    There were no complications. Patient was comfortable and left without complaint.

## 2024-09-06 DIAGNOSIS — R73.9 HYPERGLYCEMIA: ICD-10-CM

## 2024-09-06 DIAGNOSIS — R63.5 WEIGHT GAIN: ICD-10-CM

## 2024-09-06 RX ORDER — PHENTERMINE HYDROCHLORIDE 37.5 MG/1
TABLET ORAL
Qty: 30 TABLET | Refills: 0 | Status: SHIPPED | OUTPATIENT
Start: 2024-09-06

## 2024-09-09 PROCEDURE — RXMED WILLOW AMBULATORY MEDICATION CHARGE

## 2024-09-12 ENCOUNTER — PHARMACY VISIT (OUTPATIENT)
Dept: PHARMACY | Facility: CLINIC | Age: 34
End: 2024-09-12
Payer: MEDICAID

## 2024-09-16 ENCOUNTER — APPOINTMENT (OUTPATIENT)
Dept: NEUROLOGY | Facility: CLINIC | Age: 34
End: 2024-09-16
Payer: COMMERCIAL

## 2024-09-19 ENCOUNTER — APPOINTMENT (OUTPATIENT)
Dept: PRIMARY CARE | Facility: CLINIC | Age: 34
End: 2024-09-19
Payer: COMMERCIAL

## 2024-09-19 ENCOUNTER — SOCIAL WORK (OUTPATIENT)
Dept: PRIMARY CARE | Facility: CLINIC | Age: 34
End: 2024-09-19

## 2024-09-19 DIAGNOSIS — F32.A DEPRESSION, UNSPECIFIED DEPRESSION TYPE: ICD-10-CM

## 2024-09-19 DIAGNOSIS — F41.9 ANXIETY: Primary | ICD-10-CM

## 2024-09-19 PROCEDURE — 99441 PR PHYS/QHP TELEPHONE EVALUATION 5-10 MIN: CPT | Performed by: NURSE PRACTITIONER

## 2024-09-19 ASSESSMENT — ENCOUNTER SYMPTOMS
HEADACHES: 0
CHILLS: 0
DIZZINESS: 0
LIGHT-HEADEDNESS: 0
PALPITATIONS: 0
FATIGUE: 0
SORE THROAT: 0
HYPERACTIVE: 0
SINUS PRESSURE: 0
SLEEP DISTURBANCE: 0
SHORTNESS OF BREATH: 0
DEPRESSION: 1
NERVOUS/ANXIOUS: 0
COUGH: 0
WHEEZING: 0
DYSPHORIC MOOD: 0
FEVER: 0
SINUS PAIN: 0
WEAKNESS: 0
CHOKING: 0
DECREASED CONCENTRATION: 0

## 2024-09-19 ASSESSMENT — PATIENT HEALTH QUESTIONNAIRE - PHQ9
10. IF YOU CHECKED OFF ANY PROBLEMS, HOW DIFFICULT HAVE THESE PROBLEMS MADE IT FOR YOU TO DO YOUR WORK, TAKE CARE OF THINGS AT HOME, OR GET ALONG WITH OTHER PEOPLE: SOMEWHAT DIFFICULT
2. FEELING DOWN, DEPRESSED OR HOPELESS: NOT AT ALL
8. MOVING OR SPEAKING SO SLOWLY THAT OTHER PEOPLE COULD HAVE NOTICED. OR THE OPPOSITE, BEING SO FIGETY OR RESTLESS THAT YOU HAVE BEEN MOVING AROUND A LOT MORE THAN USUAL: NOT AT ALL
5. POOR APPETITE OR OVEREATING: NOT AT ALL
4. FEELING TIRED OR HAVING LITTLE ENERGY: SEVERAL DAYS
1. LITTLE INTEREST OR PLEASURE IN DOING THINGS: NOT AT ALL
3. TROUBLE FALLING OR STAYING ASLEEP: NEARLY EVERY DAY
SUM OF ALL RESPONSES TO PHQ QUESTIONS 1-9: 5
SUM OF ALL RESPONSES TO PHQ9 QUESTIONS 1 & 2: 0
7. TROUBLE CONCENTRATING ON THINGS, SUCH AS READING THE NEWSPAPER OR WATCHING TELEVISION: SEVERAL DAYS
9. THOUGHTS THAT YOU WOULD BE BETTER OFF DEAD, OR OF HURTING YOURSELF: NOT AT ALL
2. FEELING DOWN, DEPRESSED OR HOPELESS: NOT AT ALL
SUM OF ALL RESPONSES TO PHQ9 QUESTIONS 1 AND 2: 0
1. LITTLE INTEREST OR PLEASURE IN DOING THINGS: NOT AT ALL
6. FEELING BAD ABOUT YOURSELF - OR THAT YOU ARE A FAILURE OR HAVE LET YOURSELF OR YOUR FAMILY DOWN: NOT AT ALL

## 2024-09-19 ASSESSMENT — ANXIETY QUESTIONNAIRES
5. BEING SO RESTLESS THAT IT IS HARD TO SIT STILL: NEARLY EVERY DAY
3. WORRYING TOO MUCH ABOUT DIFFERENT THINGS: MORE THAN HALF THE DAYS
1. FEELING NERVOUS, ANXIOUS, OR ON EDGE: MORE THAN HALF THE DAYS
4. TROUBLE RELAXING: NEARLY EVERY DAY
GAD7 TOTAL SCORE: 15
2. NOT BEING ABLE TO STOP OR CONTROL WORRYING: MORE THAN HALF THE DAYS
6. BECOMING EASILY ANNOYED OR IRRITABLE: MORE THAN HALF THE DAYS
7. FEELING AFRAID AS IF SOMETHING AWFUL MIGHT HAPPEN: SEVERAL DAYS
IF YOU CHECKED OFF ANY PROBLEMS ON THIS QUESTIONNAIRE, HOW DIFFICULT HAVE THESE PROBLEMS MADE IT FOR YOU TO DO YOUR WORK, TAKE CARE OF THINGS AT HOME, OR GET ALONG WITH OTHER PEOPLE: VERY DIFFICULT

## 2024-09-19 NOTE — PROGRESS NOTES
Subjective   Patient ID: Violette Dee is a 34 y.o. female who presents for Anxiety and Depression.    Patient is following up on her anxiety and depression. During her last visit her Zoloft was increased and patient states it has helped with her depression but she is still experiencing a lot of anxiety. Patient denies any other concerns during today's visit.      Anxiety  Patient reports no chest pain, decreased concentration, dizziness, nervous/anxious behavior, palpitations, shortness of breath or suicidal ideas.       DepressionPatient is not experiencing: choking sensation, decreased concentration, nervousness/anxiety, palpitations, shortness of breath and suicidal ideas.       Virtual or Telephone Consent    A telephone visit (audio only) between the patient (at the originating site) and the provider (at the distant site) was utilized to provide this telehealth service.   Verbal consent was requested and obtained from Violette Dee on this date, 09/19/24 for a telehealth visit.      34 year old female (PMH: Anxiety, depression, GERD, hyperglycemia, migraine headaches, vitamin B12 deficiency, vitamin D deficiency) presents today for recheck of her anxiety/depression. At her last appointment, her Zoloft was increased to 150 mg daily. She has noticed an improvement in both her anxiety and depression. Her sleep has improved. Her appetite has been normal.  She is getting  next weekend. If she finds herself stressing about the wedding, she just tries to think of something else which has been working.  No SI/HI.     Review of Systems   Constitutional:  Negative for chills, fatigue and fever.   HENT:  Negative for congestion, sinus pressure, sinus pain and sore throat.    Respiratory:  Negative for cough, choking, shortness of breath and wheezing.    Cardiovascular:  Negative for chest pain and palpitations.   Neurological:  Negative for dizziness, weakness, light-headedness and headaches.    Psychiatric/Behavioral:  Positive for depression. Negative for decreased concentration, dysphoric mood, sleep disturbance and suicidal ideas. The patient is not nervous/anxious and is not hyperactive.        Objective   There were no vitals taken for this visit. No vitals taken with virtual visit-phone encounter only.     Physical Exam No vitals taken with virtual visit- phone encounter only.    Assessment/Plan   Problem List Items Addressed This Visit             ICD-10-CM    Anxiety - Primary F41.9    Depression F32.A   Symptoms improved with increase in Zoloft-  Continue with current medications for chronic health conditions.   Follow up in 2-3 months for recheck, or sooner with any additional concerns.

## 2024-09-19 NOTE — PROGRESS NOTES
[facility] ([facility])  Progress Note    Type of Interaction: Phone    Start Time: 10am    End Time: 1035am    Appointment: Scheduled    Reason for Visit:   Chief Complaint   Patient presents with    Anxiety    Follow up    Interval History / Patient Symptoms:     Patient Health Questionnaire-9 Score: 5 (9/19/2024 10:08 AM)  JOYCELYN-7 Total Score: 15 (9/19/2024 10:08 AM)    Interventions Provided: Referrals    Progress Made: Moderate    Response to Intervention:   -Pt reports surprisingly the medicine and everything has been helping a lot. Pt is practicing CBT skills discussed previously which pt reported they are working.  -Writer provided pt with resources for ADHD/ADD diagnostic testing and encouraged pt to get tested so if diagnosed she can be prescribed the proper medication.   -Pt stated she is very happy with the way things are progressing in her wedding coming up this next weekend and is going to feel a lot better once that is out of the way.    Plan: Pt to continue working on self care routine and communicating anger/frustration with her spouse and daughter as tensions rise in order to reduce anxiety and explosive episodes. Assess progress pt has made at meeting self-care needs and engaging in positive coping mechanisms at next appt.      Care Plan    There is no care plan documentation to display.       Patient Instructions   Follow up scheduled on 10/31/2024 @ 10am via telehealth.     Follow Up / Next Appointment: Next appointment: 10/31/24

## 2024-09-30 PROCEDURE — 99493 SBSQ PSYC COLLAB CARE MGMT: CPT | Performed by: FAMILY MEDICINE

## 2024-10-07 ENCOUNTER — DOCUMENTATION (OUTPATIENT)
Dept: PRIMARY CARE | Facility: CLINIC | Age: 34
End: 2024-10-07
Payer: COMMERCIAL

## 2024-10-07 DIAGNOSIS — F41.9 ANXIETY: Primary | ICD-10-CM

## 2024-10-09 ENCOUNTER — SPECIALTY PHARMACY (OUTPATIENT)
Dept: PHARMACY | Facility: CLINIC | Age: 34
End: 2024-10-09

## 2024-10-14 DIAGNOSIS — R39.9 UTI SYMPTOMS: ICD-10-CM

## 2024-10-24 PROCEDURE — RXMED WILLOW AMBULATORY MEDICATION CHARGE

## 2024-10-29 ENCOUNTER — PHARMACY VISIT (OUTPATIENT)
Dept: PHARMACY | Facility: CLINIC | Age: 34
End: 2024-10-29
Payer: MEDICAID

## 2024-10-29 DIAGNOSIS — R11.0 NAUSEA IN ADULT: ICD-10-CM

## 2024-10-29 RX ORDER — ONDANSETRON 8 MG/1
8 TABLET, ORALLY DISINTEGRATING ORAL EVERY 8 HOURS PRN
Qty: 30 TABLET | Refills: 1 | Status: SHIPPED | OUTPATIENT
Start: 2024-10-29

## 2024-10-31 ENCOUNTER — CLINICAL SUPPORT (OUTPATIENT)
Dept: PRIMARY CARE | Facility: CLINIC | Age: 34
End: 2024-10-31
Payer: COMMERCIAL

## 2024-10-31 ENCOUNTER — APPOINTMENT (OUTPATIENT)
Dept: PRIMARY CARE | Facility: CLINIC | Age: 34
End: 2024-10-31
Payer: COMMERCIAL

## 2024-10-31 DIAGNOSIS — Z23 NEED FOR INFLUENZA VACCINATION: ICD-10-CM

## 2024-10-31 PROCEDURE — 90656 IIV3 VACC NO PRSV 0.5 ML IM: CPT | Performed by: FAMILY MEDICINE

## 2024-10-31 PROCEDURE — 90471 IMMUNIZATION ADMIN: CPT | Performed by: FAMILY MEDICINE

## 2024-11-04 ENCOUNTER — PHARMACY VISIT (OUTPATIENT)
Dept: PHARMACY | Facility: CLINIC | Age: 34
End: 2024-11-04

## 2024-11-04 ENCOUNTER — APPOINTMENT (OUTPATIENT)
Dept: NEUROLOGY | Facility: CLINIC | Age: 34
End: 2024-11-04
Payer: COMMERCIAL

## 2024-11-04 DIAGNOSIS — R73.9 HYPERGLYCEMIA: ICD-10-CM

## 2024-11-04 DIAGNOSIS — G43.711 CHRONIC MIGRAINE WITHOUT AURA, WITH INTRACTABLE MIGRAINE, SO STATED, WITH STATUS MIGRAINOSUS: Primary | ICD-10-CM

## 2024-11-04 DIAGNOSIS — R63.5 WEIGHT GAIN: ICD-10-CM

## 2024-11-04 PROCEDURE — 99213 OFFICE O/P EST LOW 20 MIN: CPT | Performed by: STUDENT IN AN ORGANIZED HEALTH CARE EDUCATION/TRAINING PROGRAM

## 2024-11-04 PROCEDURE — 96372 THER/PROPH/DIAG INJ SC/IM: CPT | Performed by: STUDENT IN AN ORGANIZED HEALTH CARE EDUCATION/TRAINING PROGRAM

## 2024-11-04 PROCEDURE — 2500000004 HC RX 250 GENERAL PHARMACY W/ HCPCS (ALT 636 FOR OP/ED): Mod: JZ | Performed by: STUDENT IN AN ORGANIZED HEALTH CARE EDUCATION/TRAINING PROGRAM

## 2024-11-04 PROCEDURE — 64615 CHEMODENERV MUSC MIGRAINE: CPT | Performed by: STUDENT IN AN ORGANIZED HEALTH CARE EDUCATION/TRAINING PROGRAM

## 2024-11-04 PROCEDURE — 99213 OFFICE O/P EST LOW 20 MIN: CPT | Mod: 25 | Performed by: STUDENT IN AN ORGANIZED HEALTH CARE EDUCATION/TRAINING PROGRAM

## 2024-11-04 RX ORDER — ATOGEPANT 60 MG/1
60 TABLET ORAL DAILY
Qty: 30 TABLET | Refills: 6 | Status: SHIPPED | OUTPATIENT
Start: 2024-11-04

## 2024-11-04 RX ORDER — SUMATRIPTAN 50 MG/1
50 TABLET, FILM COATED ORAL ONCE AS NEEDED
Qty: 9 TABLET | Refills: 5 | Status: SHIPPED | OUTPATIENT
Start: 2024-11-04 | End: 2024-12-04

## 2024-11-04 NOTE — PROGRESS NOTES
Neurology Botulinum Injection    Subjective   Violette Dee is an 34 y.o. female here for medical botulinum injection for Chronic migraine without aura, with intractable migraine, so stated, with status migrainosus [G43.711].     Since last Botox, having a headache daily 30/30 HA days per month. Botox reducing severity by 50%. No side effects to Qulipta 30, and open to increasing dose. Denies any side effects to Botox.     Botox    Date/Time: 11/4/2024 2:05 PM    Performed by: Jeremi Blankenship DO  Authorized by: Jeremi Blankenship DO    Procedure specific details:       Procedure Note: PREEMPT Botox     Indications: Chronic Migraine     Informed consent was obtained (explaining the procedure and risks and benefits of procedure) from patient: the signed consent form was placed in the medical record.  A time out was completed, verifying correct patient, procedure,site, positioning, and implants or special equipment.     200 units of OnabotulinumtoxinA were reconstituted with saline. Sites of injection were identified via PREEMPT protocol and cleaned with alcohol pads. Using a 30 gauge needle, patient received following injections:     5 units in procerus  5 units in each left and right   10 units divided between 2 sites of L frontalis  10 units divided between 2 sites of R frontalis  20 units divided between 4 sites of L temporalis  20 units divided between 4 sites of R temporalis  15 units divided between 3 sites of L occipitalis  15 units divided between 3 sites of R occipitalis  10 units divided between 2 sites of L paracervical muscles  10 units divided between 2 sites of R paracervical muscles  15 units divided between 3 sites of L trapezius  15 units divided between 3 sites of R trapezius     Additional Sites:  10u L temporalis  10u R temporalis     Used: 175u  Wasted: 25u     There were no complications. Patient was comfortable and left without complaint.      OnabotulinumtoxinA  Lot #: [id number]  Exp:  2/2027      Assessment/Plan   Patient with chronic migraine without aura. Botox reducing severity, but still high frequency. Will increase qulipta for optimization of HA prevention.      - increase qulipta to 60mg daily  - continue Botox   - continue sumatriptan 50mg PRN

## 2024-11-05 RX ORDER — PHENTERMINE HYDROCHLORIDE 37.5 MG/1
37.5 TABLET ORAL
Qty: 30 TABLET | Refills: 0 | Status: SHIPPED | OUTPATIENT
Start: 2024-11-05 | End: 2024-12-05

## 2024-11-12 DIAGNOSIS — G43.711 CHRONIC MIGRAINE WITHOUT AURA, WITH INTRACTABLE MIGRAINE, SO STATED, WITH STATUS MIGRAINOSUS: Primary | ICD-10-CM

## 2024-11-12 RX ORDER — ELETRIPTAN HYDROBROMIDE 40 MG/1
40 TABLET, FILM COATED ORAL ONCE AS NEEDED
Qty: 9 TABLET | Refills: 5 | Status: SHIPPED | OUTPATIENT
Start: 2024-11-12 | End: 2024-12-12

## 2024-11-15 ENCOUNTER — OFFICE VISIT (OUTPATIENT)
Dept: NEUROLOGY | Facility: CLINIC | Age: 34
End: 2024-11-15
Payer: COMMERCIAL

## 2024-11-15 ENCOUNTER — PATIENT MESSAGE (OUTPATIENT)
Dept: PRIMARY CARE | Facility: CLINIC | Age: 34
End: 2024-11-15

## 2024-11-15 DIAGNOSIS — G43.711 CHRONIC MIGRAINE WITHOUT AURA, WITH INTRACTABLE MIGRAINE, SO STATED, WITH STATUS MIGRAINOSUS: Primary | ICD-10-CM

## 2024-11-15 PROCEDURE — 64405 NJX AA&/STRD GR OCPL NRV: CPT | Performed by: STUDENT IN AN ORGANIZED HEALTH CARE EDUCATION/TRAINING PROGRAM

## 2024-11-15 PROCEDURE — 64405 NJX AA&/STRD GR OCPL NRV: CPT | Mod: 50 | Performed by: STUDENT IN AN ORGANIZED HEALTH CARE EDUCATION/TRAINING PROGRAM

## 2024-11-15 PROCEDURE — 1036F TOBACCO NON-USER: CPT | Performed by: STUDENT IN AN ORGANIZED HEALTH CARE EDUCATION/TRAINING PROGRAM

## 2024-11-15 PROCEDURE — 2500000004 HC RX 250 GENERAL PHARMACY W/ HCPCS (ALT 636 FOR OP/ED): Performed by: STUDENT IN AN ORGANIZED HEALTH CARE EDUCATION/TRAINING PROGRAM

## 2024-11-15 RX ORDER — BUPIVACAINE HYDROCHLORIDE 5 MG/ML
1.5 INJECTION, SOLUTION EPIDURAL; INTRACAUDAL ONCE
Status: COMPLETED | OUTPATIENT
Start: 2024-11-15 | End: 2024-11-15

## 2024-11-15 RX ORDER — LIDOCAINE HYDROCHLORIDE 10 MG/ML
1.5 INJECTION, SOLUTION INFILTRATION; PERINEURAL ONCE
Status: COMPLETED | OUTPATIENT
Start: 2024-11-15 | End: 2024-11-15

## 2024-11-15 ASSESSMENT — ENCOUNTER SYMPTOMS
DEPRESSION: 0
OCCASIONAL FEELINGS OF UNSTEADINESS: 0
LOSS OF SENSATION IN FEET: 1

## 2024-11-15 ASSESSMENT — PATIENT HEALTH QUESTIONNAIRE - PHQ9
1. LITTLE INTEREST OR PLEASURE IN DOING THINGS: SEVERAL DAYS
10. IF YOU CHECKED OFF ANY PROBLEMS, HOW DIFFICULT HAVE THESE PROBLEMS MADE IT FOR YOU TO DO YOUR WORK, TAKE CARE OF THINGS AT HOME, OR GET ALONG WITH OTHER PEOPLE: SOMEWHAT DIFFICULT
2. FEELING DOWN, DEPRESSED OR HOPELESS: SEVERAL DAYS
SUM OF ALL RESPONSES TO PHQ9 QUESTIONS 1 AND 2: 2

## 2024-11-15 NOTE — PROGRESS NOTES
Subjective   Patient having 10+ days of headache straight, currently in status migrainosus. Would like ONB to break current headache cycle.     Nerve Block    Date/Time: 11/15/2024 2:35 PM    Performed by: Jeremi Blankenship DO  Authorized by: Jeremi Blankenship DO    Comments:      Procedure Note: Bilateral Greater Occipital Nerve Block    Indications: severe bilateral occipital pain    Informed consent was obtained (explaining the procedure and risks and benefits of procedure) from patient: the signed consent form was placed in the medical record.  A time out was completed, verifying correct patient, procedure,site, positioning, and implants or special equipment.    Patient's left occipital area was palpated to identify location of greater occipital nerve. Alcohol was applied topically to the skin. Using a 30 gauge needle (aspirating during insertion), 3cc of a 1:1 mixture of 1% lidocaine and 0.5% bupivacaine was injected on the left side. Pressure with a gauze pad was held briefly upon the site of puncture to minimize bleeding and to further spread anaesthetic subcutaneously. The procedure was repeated on the right side, injecting another 3cc on that side.    There were no complications. Patient was comfortable and left without complaint.

## 2024-11-18 DIAGNOSIS — N64.4 PAIN IN BREAST: Primary | ICD-10-CM

## 2024-11-22 ENCOUNTER — HOSPITAL ENCOUNTER (OUTPATIENT)
Dept: RADIOLOGY | Facility: HOSPITAL | Age: 34
Discharge: HOME | End: 2024-11-22
Payer: COMMERCIAL

## 2024-11-22 VITALS — BODY MASS INDEX: 29.06 KG/M2 | WEIGHT: 164 LBS | HEIGHT: 63 IN

## 2024-11-22 DIAGNOSIS — N64.4 PAIN IN BREAST: ICD-10-CM

## 2024-11-22 PROCEDURE — 77062 BREAST TOMOSYNTHESIS BI: CPT

## 2024-12-02 ENCOUNTER — DOCUMENTATION (OUTPATIENT)
Dept: PRIMARY CARE | Facility: CLINIC | Age: 34
End: 2024-12-02
Payer: COMMERCIAL

## 2024-12-19 ENCOUNTER — OFFICE VISIT (OUTPATIENT)
Dept: OBGYN CLINIC | Age: 34
End: 2024-12-19
Payer: MEDICAID

## 2024-12-19 VITALS
HEART RATE: 90 BPM | WEIGHT: 180 LBS | BODY MASS INDEX: 32.92 KG/M2 | DIASTOLIC BLOOD PRESSURE: 84 MMHG | SYSTOLIC BLOOD PRESSURE: 116 MMHG

## 2024-12-19 DIAGNOSIS — Z11.51 ENCOUNTER FOR SCREENING FOR HUMAN PAPILLOMAVIRUS (HPV): ICD-10-CM

## 2024-12-19 DIAGNOSIS — Z01.419 WOMEN'S ANNUAL ROUTINE GYNECOLOGICAL EXAMINATION: Primary | ICD-10-CM

## 2024-12-19 DIAGNOSIS — R11.0 NAUSEA: ICD-10-CM

## 2024-12-19 DIAGNOSIS — N64.52 NIPPLE DISCHARGE: ICD-10-CM

## 2024-12-19 DIAGNOSIS — R23.2 HOT FLASHES: ICD-10-CM

## 2024-12-19 DIAGNOSIS — Z01.419 WOMEN'S ANNUAL ROUTINE GYNECOLOGICAL EXAMINATION: ICD-10-CM

## 2024-12-19 DIAGNOSIS — N84.1 CERVICAL POLYP: ICD-10-CM

## 2024-12-19 DIAGNOSIS — R63.5 WEIGHT GAIN: ICD-10-CM

## 2024-12-19 PROBLEM — L29.9 ITCHING OF BOTH HANDS: Status: RESOLVED | Noted: 2022-06-20 | Resolved: 2024-12-19

## 2024-12-19 PROBLEM — E86.0 DEHYDRATION DURING PREGNANCY: Status: RESOLVED | Noted: 2022-08-08 | Resolved: 2024-12-19

## 2024-12-19 PROBLEM — O20.9 VAGINAL BLEEDING BEFORE 22 WEEKS GESTATION: Status: RESOLVED | Noted: 2022-03-25 | Resolved: 2024-12-19

## 2024-12-19 PROBLEM — O99.810 ABNORMAL GLUCOSE COMPLICATING PREGNANCY: Status: RESOLVED | Noted: 2022-08-31 | Resolved: 2024-12-19

## 2024-12-19 PROBLEM — O99.280 DEHYDRATION DURING PREGNANCY: Status: RESOLVED | Noted: 2022-08-08 | Resolved: 2024-12-19

## 2024-12-19 PROBLEM — M54.9 BACKACHE: Status: RESOLVED | Noted: 2019-07-15 | Resolved: 2024-12-19

## 2024-12-19 PROBLEM — O36.63X0 EXCESSIVE FETAL GROWTH AFFECTING MANAGEMENT OF PREGNANCY IN THIRD TRIMESTER: Status: RESOLVED | Noted: 2022-09-08 | Resolved: 2024-12-19

## 2024-12-19 PROBLEM — J30.9 ALLERGIC RHINITIS: Status: RESOLVED | Noted: 2022-05-13 | Resolved: 2024-12-19

## 2024-12-19 PROBLEM — F41.9 ANXIETY: Status: RESOLVED | Noted: 2022-05-13 | Resolved: 2024-12-19

## 2024-12-19 PROBLEM — K52.9 GASTROENTERITIS: Status: RESOLVED | Noted: 2022-05-13 | Resolved: 2024-12-19

## 2024-12-19 PROBLEM — O47.03 PRETERM UTERINE CONTRACTIONS IN THIRD TRIMESTER, ANTEPARTUM: Status: RESOLVED | Noted: 2022-09-12 | Resolved: 2024-12-19

## 2024-12-19 PROBLEM — Z37.9 NORMAL LABOR: Status: RESOLVED | Noted: 2022-09-12 | Resolved: 2024-12-19

## 2024-12-19 PROBLEM — O47.03 THREATENED PRETERM LABOR, THIRD TRIMESTER: Status: RESOLVED | Noted: 2022-08-18 | Resolved: 2024-12-19

## 2024-12-19 PROBLEM — R00.2 PALPITATIONS: Status: RESOLVED | Noted: 2019-07-15 | Resolved: 2024-12-19

## 2024-12-19 LAB
HCG, URINE, POC: NEGATIVE
Lab: NORMAL
NEGATIVE QC PASS/FAIL: NORMAL
POSITIVE QC PASS/FAIL: NORMAL

## 2024-12-19 PROCEDURE — 99395 PREV VISIT EST AGE 18-39: CPT | Performed by: STUDENT IN AN ORGANIZED HEALTH CARE EDUCATION/TRAINING PROGRAM

## 2024-12-19 PROCEDURE — 58301 REMOVE INTRAUTERINE DEVICE: CPT | Performed by: STUDENT IN AN ORGANIZED HEALTH CARE EDUCATION/TRAINING PROGRAM

## 2024-12-19 PROCEDURE — G8484 FLU IMMUNIZE NO ADMIN: HCPCS | Performed by: STUDENT IN AN ORGANIZED HEALTH CARE EDUCATION/TRAINING PROGRAM

## 2024-12-19 PROCEDURE — 81025 URINE PREGNANCY TEST: CPT | Performed by: STUDENT IN AN ORGANIZED HEALTH CARE EDUCATION/TRAINING PROGRAM

## 2024-12-19 RX ORDER — ATOGEPANT 60 MG/1
60 TABLET ORAL DAILY
COMMUNITY
Start: 2024-11-04

## 2024-12-19 RX ORDER — VARENICLINE TARTRATE 1 MG/1
1 TABLET, FILM COATED ORAL 2 TIMES DAILY
COMMUNITY
Start: 2024-07-30

## 2024-12-19 SDOH — ECONOMIC STABILITY: FOOD INSECURITY: WITHIN THE PAST 12 MONTHS, YOU WORRIED THAT YOUR FOOD WOULD RUN OUT BEFORE YOU GOT MONEY TO BUY MORE.: NEVER TRUE

## 2024-12-19 SDOH — ECONOMIC STABILITY: FOOD INSECURITY: WITHIN THE PAST 12 MONTHS, THE FOOD YOU BOUGHT JUST DIDN'T LAST AND YOU DIDN'T HAVE MONEY TO GET MORE.: NEVER TRUE

## 2024-12-19 SDOH — ECONOMIC STABILITY: INCOME INSECURITY: HOW HARD IS IT FOR YOU TO PAY FOR THE VERY BASICS LIKE FOOD, HOUSING, MEDICAL CARE, AND HEATING?: NOT HARD AT ALL

## 2024-12-19 ASSESSMENT — PATIENT HEALTH QUESTIONNAIRE - PHQ9
3. TROUBLE FALLING OR STAYING ASLEEP: NOT AT ALL
SUM OF ALL RESPONSES TO PHQ9 QUESTIONS 1 & 2: 0
5. POOR APPETITE OR OVEREATING: NOT AT ALL
9. THOUGHTS THAT YOU WOULD BE BETTER OFF DEAD, OR OF HURTING YOURSELF: NOT AT ALL
2. FEELING DOWN, DEPRESSED OR HOPELESS: NOT AT ALL
7. TROUBLE CONCENTRATING ON THINGS, SUCH AS READING THE NEWSPAPER OR WATCHING TELEVISION: NOT AT ALL
SUM OF ALL RESPONSES TO PHQ QUESTIONS 1-9: 0
4. FEELING TIRED OR HAVING LITTLE ENERGY: NOT AT ALL
10. IF YOU CHECKED OFF ANY PROBLEMS, HOW DIFFICULT HAVE THESE PROBLEMS MADE IT FOR YOU TO DO YOUR WORK, TAKE CARE OF THINGS AT HOME, OR GET ALONG WITH OTHER PEOPLE: NOT DIFFICULT AT ALL
1. LITTLE INTEREST OR PLEASURE IN DOING THINGS: NOT AT ALL
8. MOVING OR SPEAKING SO SLOWLY THAT OTHER PEOPLE COULD HAVE NOTICED. OR THE OPPOSITE, BEING SO FIGETY OR RESTLESS THAT YOU HAVE BEEN MOVING AROUND A LOT MORE THAN USUAL: NOT AT ALL
6. FEELING BAD ABOUT YOURSELF - OR THAT YOU ARE A FAILURE OR HAVE LET YOURSELF OR YOUR FAMILY DOWN: NOT AT ALL
SUM OF ALL RESPONSES TO PHQ QUESTIONS 1-9: 0

## 2024-12-19 ASSESSMENT — ENCOUNTER SYMPTOMS: NAUSEA: 1

## 2024-12-19 NOTE — ASSESSMENT & PLAN NOTE
Discussed likely her spotting after intercourse and intermittently while she wipes may be related to the cervical polyp. It is too small to remove in office, but would be amenable to laser vs LEEP. Discussed options for removal, but given patient is trying to get pregnant, would recommend consideration after pregnancy. Her pap smears have been normal with negative HPV, low concern for a high risk lesion. Last pap NILM neg HPV in 2019

## 2024-12-19 NOTE — PROGRESS NOTES
by mouth daily      varenicline (CHANTIX) 1 MG tablet Take 1 tablet by mouth 2 times daily      MIRENA, 52 MG, IUD 52 mg       pantoprazole (PROTONIX) 40 MG tablet       sertraline (ZOLOFT) 100 MG tablet Take 1.5 tablets by mouth daily      nicotine (NICODERM CQ) 21 MG/24HR Place 1 patch onto the skin daily 42 patch 0    amoxicillin (AMOXIL) 500 MG capsule  (Patient not taking: Reported on 12/19/2024)       MG tablet  (Patient not taking: Reported on 12/19/2024)      ONETOUCH VERIO strip USE AS INSTRUCTED 4 TIMES DAILY (Patient not taking: Reported on 12/19/2024)      Continuous Blood Gluc Sensor (FREESTYLE PATRICIA 2 SENSOR) MISC  (Patient not taking: Reported on 12/19/2024)      Prenatal Vit-Fe Fumarate-FA (PRENATAL 1+1 PO) Take by mouth (Patient not taking: Reported on 12/19/2024)      Lancets 30G MISC  (Patient not taking: Reported on 12/19/2024)      glucose monitoring (FREESTYLE) kit 1 each by Other route 4 times daily (Patient not taking: Reported on 12/19/2024)       Current Facility-Administered Medications   Medication Dose Route Frequency Provider Last Rate Last Admin    levonorgestrel (MIRENA) IUD 52 mg 1 each  1 each IntraUTERine Once Ana Rosa Graves APRN - CNM   1 each at 11/18/22 1323       ALLERGIES:  Allergies as of 12/19/2024 - Fully Reviewed 12/19/2024   Allergen Reaction Noted    Sulfa antibiotics Itching 08/08/2022       REVIEW OF SYSTEMS  Review of Systems   Constitutional:  Negative for chills and fever.   Gastrointestinal:  Positive for nausea.   Genitourinary:  Positive for menstrual problem and vaginal bleeding. Negative for difficulty urinating, dyspareunia, dysuria and vaginal discharge.          PHYSICAL EXAMINATION:    /84   Pulse 90   Wt 81.6 kg (180 lb)   BMI 32.92 kg/m²      Physical Exam  Constitutional:       Appearance: Normal appearance.   HENT:      Head: Normocephalic and atraumatic.      Nose: Nose normal.   Eyes:      Extraocular Movements: Extraocular

## 2025-01-08 ENCOUNTER — APPOINTMENT (OUTPATIENT)
Dept: PRIMARY CARE | Facility: CLINIC | Age: 35
End: 2025-01-08
Payer: COMMERCIAL

## 2025-01-10 DIAGNOSIS — R63.4 WEIGHT LOSS: ICD-10-CM

## 2025-01-10 RX ORDER — PHENTERMINE HYDROCHLORIDE 37.5 MG/1
37.5 TABLET ORAL
Qty: 30 TABLET | Refills: 0 | Status: SHIPPED | OUTPATIENT
Start: 2025-01-10 | End: 2025-02-09

## 2025-01-10 NOTE — TELEPHONE ENCOUNTER
JE patient    Patient contacted office to schedule an appointment to discuss weight loss treatments. Patient previously has been prescribed Adipex and Ozempic and would like to resume previously prescribed medications.     Patient scheduled for an appointment on 01/29/2025 at 10:30 a.m.

## 2025-01-20 ENCOUNTER — TELEMEDICINE (OUTPATIENT)
Dept: PRIMARY CARE | Facility: CLINIC | Age: 35
End: 2025-01-20
Payer: COMMERCIAL

## 2025-01-20 DIAGNOSIS — K52.9 GASTROENTERITIS: Primary | ICD-10-CM

## 2025-01-20 DIAGNOSIS — R11.0 NAUSEA IN ADULT: ICD-10-CM

## 2025-01-20 RX ORDER — ONDANSETRON 8 MG/1
8 TABLET, ORALLY DISINTEGRATING ORAL EVERY 8 HOURS PRN
Qty: 30 TABLET | Refills: 0 | Status: SHIPPED | OUTPATIENT
Start: 2025-01-20

## 2025-01-20 ASSESSMENT — ENCOUNTER SYMPTOMS
ABDOMINAL PAIN: 0
DIARRHEA: 1
HEADACHES: 1
MYALGIAS: 0
VOMITING: 1
FEVER: 0
WHEEZING: 0
NAUSEA: 1
SINUS PRESSURE: 0
WEAKNESS: 0
SHORTNESS OF BREATH: 0
CHILLS: 0
CONSTIPATION: 0
DIZZINESS: 0
RHINORRHEA: 0
COUGH: 1
PALPITATIONS: 0
SINUS PAIN: 1
FATIGUE: 0

## 2025-01-20 NOTE — PROGRESS NOTES
Subjective   Patient ID: Violette Dee is a 35 y.o. female who presents for URI.    Virtual or Telephone Consent    A telephone visit (audio only) between the patient (at the originating site) and the provider (at the distant site) was utilized to provide this telehealth service.   Verbal consent was requested and obtained from Violette Dee on this date, 01/20/25 for a telehealth visit.      URI   This is a new problem. The current episode started in the past 7 days (4 days). The problem has been gradually worsening. There has been no fever. Associated symptoms include congestion, coughing, diarrhea, headaches, nausea, sinus pain and vomiting. Pertinent negatives include no abdominal pain, chest pain, rash, rhinorrhea or wheezing. Associated symptoms comments: Runny nose, chills, slight fever, cold sweats. Treatments tried: Zofran. The treatment provided mild relief.        Review of Systems   Constitutional:  Negative for chills, fatigue and fever.   HENT:  Positive for congestion and sinus pain. Negative for rhinorrhea and sinus pressure.    Respiratory:  Positive for cough. Negative for shortness of breath and wheezing.    Cardiovascular:  Negative for chest pain and palpitations.   Gastrointestinal:  Positive for diarrhea, nausea and vomiting. Negative for abdominal pain and constipation.   Musculoskeletal:  Negative for myalgias.   Skin:  Negative for rash.   Neurological:  Positive for headaches. Negative for dizziness and weakness.       Objective   There were no vitals taken for this visit. No vitals taken with virtual visit- phone encounter only    Physical Exam No physical exam done with virtual visit- phone encounter only    Assessment/Plan   Problem List Items Addressed This Visit             ICD-10-CM    Gastroenteritis - Primary K52.9    Nausea in adult R11.0   Discussed with patient that infection appears viral in nature and antibiotics are not indicated at this time.   Increase rest and  fluids.   Zofran as needed for nausea  OK for work note to be off today and tomorrow.   Follow up in 3-5 days with any persisting symptoms, or sooner with any additional concerns.   If developing any severe/worsening symptoms, abdominal pain, fever/chills, or unable to keep food/fluids down, proceed to emergency department

## 2025-01-23 ENCOUNTER — SPECIALTY PHARMACY (OUTPATIENT)
Dept: PHARMACY | Facility: CLINIC | Age: 35
End: 2025-01-23

## 2025-01-23 PROCEDURE — RXMED WILLOW AMBULATORY MEDICATION CHARGE

## 2025-01-24 ENCOUNTER — PHARMACY VISIT (OUTPATIENT)
Dept: PHARMACY | Facility: CLINIC | Age: 35
End: 2025-01-24
Payer: MEDICAID

## 2025-01-29 ENCOUNTER — APPOINTMENT (OUTPATIENT)
Dept: PRIMARY CARE | Facility: CLINIC | Age: 35
End: 2025-01-29
Payer: COMMERCIAL

## 2025-02-03 ENCOUNTER — PROCEDURE VISIT (OUTPATIENT)
Dept: NEUROLOGY | Facility: CLINIC | Age: 35
End: 2025-02-03
Payer: COMMERCIAL

## 2025-02-03 DIAGNOSIS — G43.709 CHRONIC MIGRAINE W/O AURA W/O STATUS MIGRAINOSUS, NOT INTRACTABLE: Primary | ICD-10-CM

## 2025-02-03 PROCEDURE — 96372 THER/PROPH/DIAG INJ SC/IM: CPT | Mod: 59 | Performed by: STUDENT IN AN ORGANIZED HEALTH CARE EDUCATION/TRAINING PROGRAM

## 2025-02-03 PROCEDURE — 2500000004 HC RX 250 GENERAL PHARMACY W/ HCPCS (ALT 636 FOR OP/ED): Performed by: STUDENT IN AN ORGANIZED HEALTH CARE EDUCATION/TRAINING PROGRAM

## 2025-02-03 PROCEDURE — 64615 CHEMODENERV MUSC MIGRAINE: CPT | Performed by: STUDENT IN AN ORGANIZED HEALTH CARE EDUCATION/TRAINING PROGRAM

## 2025-02-03 RX ORDER — SUMATRIPTAN SUCCINATE 50 MG/1
50 TABLET ORAL ONCE AS NEEDED
Qty: 9 TABLET | Refills: 5 | Status: SHIPPED | OUTPATIENT
Start: 2025-02-03 | End: 2025-03-05

## 2025-02-03 RX ADMIN — ONABOTULINUMTOXINA 175 UNITS: 200 INJECTION, POWDER, LYOPHILIZED, FOR SOLUTION INTRADERMAL; INTRAMUSCULAR at 13:50

## 2025-02-03 NOTE — PROGRESS NOTES
Subjective   Since last Botox, patient having 10/30 HA days per month while on Botox and qulipta 60mg. No side effects to either medication.    Botox    Date/Time: 2/3/2025 1:44 PM    Performed by: Jeremi Blankenship DO  Authorized by: Jeremi Blankenship DO    Procedure specific details:      Procedure Note: PREEMPT Botox    Indications: Chronic Migraine    Informed consent was obtained (explaining the procedure and risks and benefits of procedure) from patient: the signed consent form was placed in the medical record.  A time out was completed, verifying correct patient, procedure,site, positioning, and implants or special equipment.    200 units of OnabotulinumtoxinA were reconstituted with saline. Sites of injection were identified via PREEMPT protocol and cleaned with alcohol pads. Using a 30 gauge needle, patient received following injections:    5 units in procerus  5 units in each left and right   10 units divided between 2 sites of L frontalis  10 units divided between 2 sites of R frontalis  20 units divided between 4 sites of L temporalis  20 units divided between 4 sites of R temporalis  15 units divided between 3 sites of L occipitalis  15 units divided between 3 sites of R occipitalis  10 units divided between 2 sites of L paracervical muscles  10 units divided between 2 sites of R paracervical muscles  15 units divided between 3 sites of L trapezius  15 units divided between 3 sites of R trapezius    Additional Sites:  10u L temporalis  10u R temporalis    Used: 175u  Wasted: 25u    There were no complications. Patient was comfortable and left without complaint.     OnabotulinumtoxinA  Lot #: [id number]  Exp: 4/2027

## 2025-02-12 ENCOUNTER — APPOINTMENT (OUTPATIENT)
Dept: PRIMARY CARE | Facility: CLINIC | Age: 35
End: 2025-02-12
Payer: COMMERCIAL

## 2025-02-20 ENCOUNTER — SPECIALTY PHARMACY (OUTPATIENT)
Dept: PHARMACY | Facility: CLINIC | Age: 35
End: 2025-02-20

## 2025-03-05 ENCOUNTER — TELEPHONE (OUTPATIENT)
Dept: PRIMARY CARE | Facility: CLINIC | Age: 35
End: 2025-03-05

## 2025-03-05 ENCOUNTER — APPOINTMENT (OUTPATIENT)
Dept: PRIMARY CARE | Facility: CLINIC | Age: 35
End: 2025-03-05
Payer: COMMERCIAL

## 2025-03-05 VITALS
WEIGHT: 190 LBS | OXYGEN SATURATION: 98 % | BODY MASS INDEX: 34.96 KG/M2 | HEART RATE: 74 BPM | HEIGHT: 62 IN | DIASTOLIC BLOOD PRESSURE: 76 MMHG | SYSTOLIC BLOOD PRESSURE: 128 MMHG

## 2025-03-05 DIAGNOSIS — R73.9 HYPERGLYCEMIA: ICD-10-CM

## 2025-03-05 DIAGNOSIS — R63.4 WEIGHT LOSS: ICD-10-CM

## 2025-03-05 DIAGNOSIS — R60.9 EDEMA, UNSPECIFIED TYPE: Primary | ICD-10-CM

## 2025-03-05 DIAGNOSIS — F10.10 ALCOHOL ABUSE: Primary | ICD-10-CM

## 2025-03-05 DIAGNOSIS — E66.811 CLASS 1 OBESITY WITH SERIOUS COMORBIDITY AND BODY MASS INDEX (BMI) OF 34.0 TO 34.9 IN ADULT, UNSPECIFIED OBESITY TYPE: ICD-10-CM

## 2025-03-05 PROCEDURE — 99214 OFFICE O/P EST MOD 30 MIN: CPT | Performed by: FAMILY MEDICINE

## 2025-03-05 RX ORDER — TIRZEPATIDE 2.5 MG/.5ML
2.5 INJECTION, SOLUTION SUBCUTANEOUS WEEKLY
Qty: 2 ML | Refills: 0 | COMMUNITY
Start: 2025-03-05

## 2025-03-05 RX ORDER — HYDROCHLOROTHIAZIDE 25 MG/1
25 TABLET ORAL DAILY
Qty: 30 TABLET | Refills: 0 | Status: SHIPPED | OUTPATIENT
Start: 2025-03-05 | End: 2025-04-04

## 2025-03-05 ASSESSMENT — ENCOUNTER SYMPTOMS
ABDOMINAL PAIN: 0
SINUS PRESSURE: 0
SPEECH DIFFICULTY: 0
TROUBLE SWALLOWING: 0
DECREASED CONCENTRATION: 0
HEMATURIA: 0
NUMBNESS: 1
SINUS PAIN: 0
FATIGUE: 0
NECK STIFFNESS: 0
FLANK PAIN: 0
CONSTIPATION: 0
PHOTOPHOBIA: 0
POLYDIPSIA: 0
CONSTITUTIONAL NEGATIVE: 1
PALPITATIONS: 0
ADENOPATHY: 0
AGITATION: 0
SHORTNESS OF BREATH: 0
HEADACHES: 0
SORE THROAT: 0
ARTHRALGIAS: 1
COUGH: 0
DIARRHEA: 0
FEVER: 0
ACTIVITY CHANGE: 0
NERVOUS/ANXIOUS: 0
CONFUSION: 0
STRIDOR: 0
APPETITE CHANGE: 0
DYSURIA: 0
RHINORRHEA: 0
MYALGIAS: 1
EYE PAIN: 0
SLEEP DISTURBANCE: 0
POLYPHAGIA: 0
SEIZURES: 0
DIZZINESS: 0
CHEST TIGHTNESS: 0
COLOR CHANGE: 0
DYSPHORIC MOOD: 0
RECTAL PAIN: 0
BLOOD IN STOOL: 0
ABDOMINAL DISTENTION: 0

## 2025-03-05 NOTE — PROGRESS NOTES
"Subjective   Patient ID: Violette Dee is a 35 y.o. female who presents for Numbness, Edema, and Weight Gain.    HPI Pt states she is having pain and swelling in B/L hands and states her toes on both feet are going numb.    Pt would like to discuss something other than Adipex, she  states she has a weird feeling when taking it.    Review of Systems   Constitutional: Negative.  Negative for activity change, appetite change, fatigue and fever.   HENT:  Negative for congestion, dental problem, ear discharge, ear pain, mouth sores, rhinorrhea, sinus pressure, sinus pain, sore throat, tinnitus and trouble swallowing.    Eyes:  Negative for photophobia, pain and visual disturbance.   Respiratory:  Negative for cough, chest tightness, shortness of breath and stridor.    Cardiovascular:  Negative for chest pain and palpitations.   Gastrointestinal:  Negative for abdominal distention, abdominal pain, blood in stool, constipation, diarrhea and rectal pain.   Endocrine: Negative for cold intolerance, heat intolerance, polydipsia, polyphagia and polyuria.   Genitourinary:  Negative for dysuria, flank pain, hematuria and urgency.   Musculoskeletal:  Positive for arthralgias and myalgias. Negative for gait problem and neck stiffness.   Skin:  Negative for color change and rash.   Allergic/Immunologic: Negative for environmental allergies and food allergies.   Neurological:  Positive for numbness. Negative for dizziness, seizures, syncope, speech difficulty and headaches.   Hematological:  Negative for adenopathy.   Psychiatric/Behavioral:  Negative for agitation, confusion, decreased concentration, dysphoric mood and sleep disturbance. The patient is not nervous/anxious.        Objective   /76 (BP Location: Right arm, Patient Position: Sitting, BP Cuff Size: Adult)   Pulse 74   Ht 1.575 m (5' 2\")   Wt 86.2 kg (190 lb)   SpO2 98%   BMI 34.75 kg/m²     Physical Exam  Vitals reviewed.   Constitutional:       General: " She is not in acute distress.     Appearance: Normal appearance. She is obese. She is not ill-appearing or diaphoretic.   HENT:      Head: Normocephalic.      Right Ear: Tympanic membrane and external ear normal.      Left Ear: Tympanic membrane and external ear normal.      Nose: Nose normal. No congestion.      Mouth/Throat:      Pharynx: No posterior oropharyngeal erythema.   Eyes:      General:         Right eye: No discharge.         Left eye: No discharge.      Extraocular Movements: Extraocular movements intact.      Conjunctiva/sclera: Conjunctivae normal.      Pupils: Pupils are equal, round, and reactive to light.   Cardiovascular:      Rate and Rhythm: Normal rate and regular rhythm.      Pulses: Normal pulses.      Heart sounds: Normal heart sounds. No murmur heard.  Pulmonary:      Effort: Pulmonary effort is normal. No respiratory distress.      Breath sounds: Normal breath sounds. No wheezing or rales.   Chest:      Chest wall: No tenderness.   Abdominal:      General: Abdomen is flat. Bowel sounds are normal. There is no distension.      Palpations: There is no mass.      Tenderness: There is no abdominal tenderness. There is no guarding.   Musculoskeletal:         General: No tenderness. Normal range of motion.      Cervical back: Normal range of motion and neck supple. No tenderness.      Right lower leg: No edema.      Left lower leg: No edema.   Skin:     General: Skin is dry.      Coloration: Skin is not jaundiced.      Findings: No bruising, erythema or rash.   Neurological:      General: No focal deficit present.      Mental Status: She is alert and oriented to person, place, and time. Mental status is at baseline.      Cranial Nerves: No cranial nerve deficit.      Sensory: No sensory deficit.      Coordination: Coordination normal.      Gait: Gait normal.   Psychiatric:         Mood and Affect: Mood normal.         Thought Content: Thought content normal.         Judgment: Judgment normal.          Assessment/Plan   Problem List Items Addressed This Visit             ICD-10-CM    Hyperglycemia R73.9    Obesity E66.9    BMI 34.0-34.9,adult Z68.34    Relevant Medications    tirzepatide, weight loss, (Zepbound) 2.5 mg/0.5 mL injection    tirzepatide, weight loss, (Zepbound) 5 mg/0.5 mL injection    tirzepatide (Mounjaro) 2.5 mg/0.5 mL pen injector    Weight loss R63.4    Relevant Medications    tirzepatide, weight loss, (Zepbound) 2.5 mg/0.5 mL injection    tirzepatide, weight loss, (Zepbound) 5 mg/0.5 mL injection    tirzepatide (Mounjaro) 2.5 mg/0.5 mL pen injector    Edema - Primary R60.9    Relevant Medications    hydroCHLOROthiazide (HYDRODiuril) 25 mg tablet   Start Zepbound 2.5 mg     Mounjaro sample given today       Scribe Attestation  By signing my name below, I, RAY Mondragon , Scribe   attest that this documentation has been prepared under the direction and in the presence of Gaston Montenegro DO.     All medical record entries made by the Scribe were at my direction and personally dictated by me. I have reviewed the chart and agree that the record accurately reflects my personal performance of the history, physical exam, discussion and plan.

## 2025-03-07 RX ORDER — NALTREXONE HYDROCHLORIDE 50 MG/1
50 TABLET, FILM COATED ORAL DAILY
Qty: 30 TABLET | Refills: 11 | Status: SHIPPED | OUTPATIENT
Start: 2025-03-07 | End: 2026-03-07

## 2025-03-10 ENCOUNTER — PHARMACY VISIT (OUTPATIENT)
Dept: PHARMACY | Facility: CLINIC | Age: 35
End: 2025-03-10
Payer: MEDICAID

## 2025-03-10 ENCOUNTER — SPECIALTY PHARMACY (OUTPATIENT)
Dept: PHARMACY | Facility: CLINIC | Age: 35
End: 2025-03-10

## 2025-03-10 PROCEDURE — RXMED WILLOW AMBULATORY MEDICATION CHARGE

## 2025-03-19 ENCOUNTER — PROCEDURE VISIT (OUTPATIENT)
Dept: OBGYN CLINIC | Age: 35
End: 2025-03-19
Payer: MEDICAID

## 2025-03-19 VITALS
DIASTOLIC BLOOD PRESSURE: 68 MMHG | BODY MASS INDEX: 33.82 KG/M2 | HEART RATE: 81 BPM | SYSTOLIC BLOOD PRESSURE: 108 MMHG | HEIGHT: 62 IN | WEIGHT: 183.8 LBS

## 2025-03-19 DIAGNOSIS — Z30.430 ENCOUNTER FOR INSERTION OF MIRENA IUD: Primary | ICD-10-CM

## 2025-03-19 LAB
CONTROL: NORMAL
PREGNANCY TEST URINE, POC: NEGATIVE

## 2025-03-19 PROCEDURE — 58300 INSERT INTRAUTERINE DEVICE: CPT | Performed by: STUDENT IN AN ORGANIZED HEALTH CARE EDUCATION/TRAINING PROGRAM

## 2025-03-19 PROCEDURE — 81025 URINE PREGNANCY TEST: CPT | Performed by: STUDENT IN AN ORGANIZED HEALTH CARE EDUCATION/TRAINING PROGRAM

## 2025-03-19 RX ORDER — HYDROCHLOROTHIAZIDE 25 MG/1
25 TABLET ORAL DAILY
COMMUNITY
Start: 2025-03-05 | End: 2025-04-04

## 2025-03-19 RX ORDER — NALTREXONE HYDROCHLORIDE 50 MG/1
50 TABLET, FILM COATED ORAL DAILY
COMMUNITY
Start: 2025-03-07 | End: 2026-03-07

## 2025-03-19 RX ORDER — ONDANSETRON 8 MG/1
TABLET, ORALLY DISINTEGRATING ORAL
COMMUNITY
Start: 2025-02-03

## 2025-03-19 RX ORDER — ONABOTULINUMTOXINA 200 [USP'U]/1
INJECTION, POWDER, LYOPHILIZED, FOR SOLUTION INTRADERMAL; INTRAMUSCULAR
COMMUNITY
Start: 2025-01-23

## 2025-03-19 RX ORDER — PHENTERMINE HYDROCHLORIDE 37.5 MG/1
TABLET ORAL
COMMUNITY
Start: 2025-02-03

## 2025-03-19 SDOH — ECONOMIC STABILITY: FOOD INSECURITY: WITHIN THE PAST 12 MONTHS, THE FOOD YOU BOUGHT JUST DIDN'T LAST AND YOU DIDN'T HAVE MONEY TO GET MORE.: NEVER TRUE

## 2025-03-19 SDOH — ECONOMIC STABILITY: FOOD INSECURITY: WITHIN THE PAST 12 MONTHS, YOU WORRIED THAT YOUR FOOD WOULD RUN OUT BEFORE YOU GOT MONEY TO BUY MORE.: NEVER TRUE

## 2025-03-19 ASSESSMENT — PATIENT HEALTH QUESTIONNAIRE - PHQ9
3. TROUBLE FALLING OR STAYING ASLEEP: NEARLY EVERY DAY
SUM OF ALL RESPONSES TO PHQ QUESTIONS 1-9: 7
7. TROUBLE CONCENTRATING ON THINGS, SUCH AS READING THE NEWSPAPER OR WATCHING TELEVISION: NEARLY EVERY DAY
4. FEELING TIRED OR HAVING LITTLE ENERGY: SEVERAL DAYS
SUM OF ALL RESPONSES TO PHQ QUESTIONS 1-9: 7
2. FEELING DOWN, DEPRESSED OR HOPELESS: NOT AT ALL
SUM OF ALL RESPONSES TO PHQ QUESTIONS 1-9: 7
SUM OF ALL RESPONSES TO PHQ QUESTIONS 1-9: 7
6. FEELING BAD ABOUT YOURSELF - OR THAT YOU ARE A FAILURE OR HAVE LET YOURSELF OR YOUR FAMILY DOWN: NOT AT ALL
5. POOR APPETITE OR OVEREATING: NOT AT ALL
1. LITTLE INTEREST OR PLEASURE IN DOING THINGS: NOT AT ALL
9. THOUGHTS THAT YOU WOULD BE BETTER OFF DEAD, OR OF HURTING YOURSELF: NOT AT ALL
10. IF YOU CHECKED OFF ANY PROBLEMS, HOW DIFFICULT HAVE THESE PROBLEMS MADE IT FOR YOU TO DO YOUR WORK, TAKE CARE OF THINGS AT HOME, OR GET ALONG WITH OTHER PEOPLE: NOT DIFFICULT AT ALL

## 2025-03-19 NOTE — PROGRESS NOTES
Consent:  The patient elects for the placement of a Mirena IUD. Pt received comprehensive counseling regarding risks, benefits, alternatives to the Mirena IUD including but not limited to risk of uterine perforation, IUD expulsion (2-10% in the first year), abnormal bleeding for the first 3-6 months, cramping, IUD malplacement requiring possible further surgery, uterine infection, and in the rare case of pregnancy an increased risk of ectopic pregnancy. We also discussed that 15-20% of women stop having their periods altogether after the first year. The IUD may reduce the risk of endometrial cancer. Although the IUD lasts for up to 8 years, it can be removed sooner. All of the patient's questions were answered. Patient demonstrates understanding and requests to proceed with IUD placement.    IUD Insertion:  The patient is requesting that a Mirena IUD be inserted. The patient was counseled on the procedure. Risks, benefits and alternatives were reviewed.  She is aware that she may have irregular bleeding for up to 6 months after insertion. The side effect profile of the specific IUD was reviewed.  A consent was reviewed and obtained.    Urine hcg: negative    The patient was counseled on the need for string checks after her menses. She is to notify the office if she can not locate the IUD string at anytime. She is aware that she will need a speculum exam and possibly an ultrasound to confirm the proper orientation of the IUD.      Procedure:  The patient was positioned comfortably on the exam table.  A sterile speculum was placed without incident.The site was then cleansed with betadine and the uterus was sounded to 7 cm.  The Mirena IUD was opened and loaded into the delivery system. The wand was inserted to just past the internal portion and the button was retracted to the first line. The wand was held in place for 10 seconds and then the button was retracted to its final position while the IUD was moved to

## 2025-03-24 DIAGNOSIS — R63.5 WEIGHT GAIN: ICD-10-CM

## 2025-03-24 DIAGNOSIS — R73.9 HYPERGLYCEMIA: ICD-10-CM

## 2025-03-24 DIAGNOSIS — F17.299 OTHER TOBACCO PRODUCT NICOTINE DEPENDENCE WITH NICOTINE-INDUCED DISORDER: ICD-10-CM

## 2025-03-24 RX ORDER — VARENICLINE TARTRATE 1 MG/1
1 TABLET, FILM COATED ORAL 2 TIMES DAILY
Qty: 60 TABLET | Refills: 2 | Status: SHIPPED | OUTPATIENT
Start: 2025-03-24 | End: 2025-06-22

## 2025-03-24 RX ORDER — PHENTERMINE HYDROCHLORIDE 37.5 MG/1
37.5 TABLET ORAL
Qty: 30 TABLET | Refills: 0 | Status: SHIPPED | OUTPATIENT
Start: 2025-03-24 | End: 2025-04-23

## 2025-04-03 ENCOUNTER — SPECIALTY PHARMACY (OUTPATIENT)
Dept: PHARMACY | Facility: CLINIC | Age: 35
End: 2025-04-03

## 2025-04-03 PROCEDURE — RXMED WILLOW AMBULATORY MEDICATION CHARGE

## 2025-04-04 ENCOUNTER — PHARMACY VISIT (OUTPATIENT)
Dept: PHARMACY | Facility: CLINIC | Age: 35
End: 2025-04-04
Payer: MEDICAID

## 2025-04-17 DIAGNOSIS — R60.9 EDEMA, UNSPECIFIED TYPE: ICD-10-CM

## 2025-04-17 NOTE — TELEPHONE ENCOUNTER
Patient of Dr. Moni Mix submitted for zepbound    It is denied because it is excluded    Patient insurance did not let me do an appeal because of the exclusion    Insurance stated formulary mediations are adipex and saxenda is covered

## 2025-04-21 RX ORDER — HYDROCHLOROTHIAZIDE 25 MG/1
25 TABLET ORAL DAILY
Qty: 30 TABLET | Refills: 3 | Status: SHIPPED | OUTPATIENT
Start: 2025-04-21 | End: 2025-08-19

## 2025-04-23 NOTE — TELEPHONE ENCOUNTER
JE patient.     Patient contacted office to obtain sample of mounjaro. Patient's PCP authorized sample to be dispensed.     Patient informed sample is available for .

## 2025-04-24 RX ORDER — TIRZEPATIDE 2.5 MG/.5ML
2.5 INJECTION, SOLUTION SUBCUTANEOUS WEEKLY
Qty: 2 ML | Refills: 0 | COMMUNITY
Start: 2025-04-24

## 2025-04-29 DIAGNOSIS — F32.9 MAJOR DEPRESSIVE DISORDER, REMISSION STATUS UNSPECIFIED, UNSPECIFIED WHETHER RECURRENT: ICD-10-CM

## 2025-04-29 DIAGNOSIS — R63.5 WEIGHT GAIN: ICD-10-CM

## 2025-04-29 DIAGNOSIS — R73.9 HYPERGLYCEMIA: ICD-10-CM

## 2025-04-29 PROCEDURE — RXMED WILLOW AMBULATORY MEDICATION CHARGE

## 2025-04-29 RX ORDER — PHENTERMINE HYDROCHLORIDE 37.5 MG/1
37.5 TABLET ORAL
Qty: 30 TABLET | Refills: 0 | Status: SHIPPED | OUTPATIENT
Start: 2025-04-29 | End: 2025-05-29

## 2025-04-29 RX ORDER — SERTRALINE HYDROCHLORIDE 100 MG/1
150 TABLET, FILM COATED ORAL DAILY
Qty: 135 TABLET | Refills: 1 | Status: SHIPPED | OUTPATIENT
Start: 2025-04-29

## 2025-05-05 ENCOUNTER — PHARMACY VISIT (OUTPATIENT)
Dept: PHARMACY | Facility: CLINIC | Age: 35
End: 2025-05-05
Payer: MEDICAID

## 2025-05-07 ENCOUNTER — SPECIALTY PHARMACY (OUTPATIENT)
Dept: PHARMACY | Facility: CLINIC | Age: 35
End: 2025-05-07

## 2025-05-07 ENCOUNTER — APPOINTMENT (OUTPATIENT)
Dept: PRIMARY CARE | Facility: CLINIC | Age: 35
End: 2025-05-07
Payer: COMMERCIAL

## 2025-05-09 ENCOUNTER — OFFICE VISIT (OUTPATIENT)
Dept: PRIMARY CARE | Facility: CLINIC | Age: 35
End: 2025-05-09
Payer: COMMERCIAL

## 2025-05-09 VITALS
HEIGHT: 62 IN | TEMPERATURE: 96.1 F | DIASTOLIC BLOOD PRESSURE: 68 MMHG | BODY MASS INDEX: 32.17 KG/M2 | OXYGEN SATURATION: 96 % | RESPIRATION RATE: 16 BRPM | SYSTOLIC BLOOD PRESSURE: 114 MMHG | WEIGHT: 174.8 LBS | HEART RATE: 60 BPM

## 2025-05-09 DIAGNOSIS — R39.9 SYMPTOMS OF URINARY TRACT INFECTION: Primary | ICD-10-CM

## 2025-05-09 DIAGNOSIS — R39.15 URGENCY OF URINATION: ICD-10-CM

## 2025-05-09 DIAGNOSIS — R30.0 BURNING WITH URINATION: ICD-10-CM

## 2025-05-09 LAB
POC APPEARANCE, URINE: ABNORMAL
POC BILIRUBIN, URINE: ABNORMAL
POC BLOOD, URINE: ABNORMAL
POC COLOR, URINE: ABNORMAL
POC GLUCOSE, URINE: NEGATIVE MG/DL
POC KETONES, URINE: ABNORMAL MG/DL
POC LEUKOCYTES, URINE: ABNORMAL
POC NITRITE,URINE: POSITIVE
POC PH, URINE: 7 PH
POC PROTEIN, URINE: ABNORMAL MG/DL
POC SPECIFIC GRAVITY, URINE: 1.02
POC UROBILINOGEN, URINE: >=8 EU/DL

## 2025-05-09 PROCEDURE — 99213 OFFICE O/P EST LOW 20 MIN: CPT | Performed by: NURSE PRACTITIONER

## 2025-05-09 PROCEDURE — 81003 URINALYSIS AUTO W/O SCOPE: CPT | Performed by: NURSE PRACTITIONER

## 2025-05-09 RX ORDER — NITROFURANTOIN 25; 75 MG/1; MG/1
100 CAPSULE ORAL 2 TIMES DAILY
Qty: 14 CAPSULE | Refills: 0 | Status: SHIPPED | OUTPATIENT
Start: 2025-05-09 | End: 2025-05-16

## 2025-05-09 ASSESSMENT — ENCOUNTER SYMPTOMS
DEPRESSION: 0
OCCASIONAL FEELINGS OF UNSTEADINESS: 0
LOSS OF SENSATION IN FEET: 0

## 2025-05-09 ASSESSMENT — PATIENT HEALTH QUESTIONNAIRE - PHQ9
1. LITTLE INTEREST OR PLEASURE IN DOING THINGS: NOT AT ALL
SUM OF ALL RESPONSES TO PHQ9 QUESTIONS 1 AND 2: 0
SUM OF ALL RESPONSES TO PHQ9 QUESTIONS 1 AND 2: 0
2. FEELING DOWN, DEPRESSED OR HOPELESS: NOT AT ALL
1. LITTLE INTEREST OR PLEASURE IN DOING THINGS: NOT AT ALL
2. FEELING DOWN, DEPRESSED OR HOPELESS: NOT AT ALL

## 2025-05-09 ASSESSMENT — PAIN SCALES - GENERAL: PAINLEVEL_OUTOF10: 4

## 2025-05-09 NOTE — PROGRESS NOTES
Subjective   Patient ID: Violette Dee is a 35 y.o. female who is with complaint of symptoms of UTI.    HPI   Patient is a 35 y.o. female who CONSULTED AT [facility] today. Patient is with complaints of mild burning sensation on urination, increased urinary frequency, urgency of urination, sensation of inadequate emptying post voiding, mild lower abdominal discomfort (dysuria), nocturia, low back pain, slightly cloudy urine, blood in urine, and mild nausea. She denies having any flank pain, incontinence, vomiting, chills, nor fever. Patient states symptoms has been going on for 2 days. Patient has not taken any medication for relief of symptoms.     Review of Systems  General: no weight loss, generally healthy, no fatigue  Head:  no headaches / sinus pain, no vertigo, no injury  Eyes: no diplopia, no tearing, no pain,   Ears: no change in hearing, no tinnitus, no bleeding, no vertigo  Mouth:  no dental difficulties, no gingival bleeding, no sore throat, no loss of sense of taste  Nose: no congestion, no  discharge, no bleeding, no obstruction, no loss of sense of smell  Neck: no stiffness, no pain, no tenderness, no masses, no bruit  Pulmonary: no dyspnea, no wheezing, no hemoptysis, no cough  Cardiovascular: no chest pain, no palpitations, no syncope, no orthopnea  Gastrointestinal: no change in appetite, no dysphagia, no abdominal pains, no diarrhea, (+) mild nausea, no emesis, no melena  Genito Urinary: (+) mild burning sensation on urination, (+) increased urinary frequency, (+) urgency of urination, (+) sensation of inadequate emptying post voiding, (+) mild lower abdominal discomfort (dysuria), (+) nocturia, (+) low back pain, (+) slightly cloudy urine, (+) blood in urine, no flank pain, no incontinence,   Musculoskeletal: no muscle ache, no joint pain, no limitation of range of motion, no paresthesia, no numbness  Constitutional: no fever, no chills, no night sweats    Objective  "  /68   Pulse 60   Temp 35.6 °C (96.1 °F)   Resp 16   Ht 1.575 m (5' 2\")   Wt 79.3 kg (174 lb 12.8 oz)   SpO2 96%   BMI 31.97 kg/m²     Physical Exam  General: ambulatory, in no acute distress  Head: normocephalic, no lesions  Eyes: pink palpebral conjunctiva, anicteric sclerae, PERRLA, EOM's full  Abdomen: flat, NABS, soft, (+) direct tenderness on hypogastric area, no rebound tenderness, no mass palpated, SIGNS: no Eddy, no Rovsings, no Psoas, no Obturator; No CVA tenderness,    Assessment/Plan   Problem List Items Addressed This Visit    None  Visit Diagnoses         Codes      Symptoms of urinary tract infection    -  Primary R39.9    Relevant Medications    nitrofurantoin, macrocrystal-monohydrate, (Macrobid) 100 mg capsule    Other Relevant Orders    POCT UA Automated manually resulted (Completed)    Urine Culture      Burning with urination     R30.0    Relevant Medications    nitrofurantoin, macrocrystal-monohydrate, (Macrobid) 100 mg capsule    Other Relevant Orders    POCT UA Automated manually resulted (Completed)    Urine Culture      Urgency of urination     R39.15    Relevant Medications    nitrofurantoin, macrocrystal-monohydrate, (Macrobid) 100 mg capsule    Other Relevant Orders    POCT UA Automated manually resulted (Completed)    Urine Culture        Urinalysis was done at the office today. Urinalysis result explained and discussed with patient. Urine sample submitted to laboratory for culture and sensitivity study. The laboratory examination requested were explained and discussed with patient.    DISCHARGE SUMMARY:   Patient seen and examined. Probable diagnosis, differential diagnosis, treatment, treatment options, and probable complications were discussed and explained to patient. she was to take medication/s associated with this visit. she may take over-the-counter pain and/or fever medication if needed. Advised increased oral fluid intake (2 liters of water or more per day). " Reinforced to continue personal hygiene. Patient to return to clinic if there is worsening or persistence of symptoms. Patient verbalized understanding.    Patient to come back in 7 - 10 days if needed for worsening symptoms.

## 2025-05-09 NOTE — PROGRESS NOTES
"Subjective   Patient ID: Violette Dee is a 35 y.o. female who presents for uti  Symptoms: flank pain both sides, frequency, hesitancy, bleeding , pelvic pressure  Length of symptoms: yesterday  OTC: none  Related information:    HPI     Review of Systems    Objective   /68   Pulse 60   Temp 35.6 °C (96.1 °F)   Resp 16   Ht 1.575 m (5' 2\")   Wt 79.3 kg (174 lb 12.8 oz)   SpO2 96%   BMI 31.97 kg/m²     Physical Exam    Assessment/Plan          "

## 2025-05-11 LAB — BACTERIA UR CULT: ABNORMAL

## 2025-05-12 ENCOUNTER — PROCEDURE VISIT (OUTPATIENT)
Dept: NEUROLOGY | Facility: CLINIC | Age: 35
End: 2025-05-12
Payer: COMMERCIAL

## 2025-05-12 ENCOUNTER — DOCUMENTATION (OUTPATIENT)
Dept: PRIMARY CARE | Facility: CLINIC | Age: 35
End: 2025-05-12

## 2025-05-12 DIAGNOSIS — G43.709 CHRONIC MIGRAINE W/O AURA W/O STATUS MIGRAINOSUS, NOT INTRACTABLE: Primary | ICD-10-CM

## 2025-05-12 PROCEDURE — 64615 CHEMODENERV MUSC MIGRAINE: CPT | Performed by: STUDENT IN AN ORGANIZED HEALTH CARE EDUCATION/TRAINING PROGRAM

## 2025-05-12 PROCEDURE — 99214 OFFICE O/P EST MOD 30 MIN: CPT | Mod: 25 | Performed by: STUDENT IN AN ORGANIZED HEALTH CARE EDUCATION/TRAINING PROGRAM

## 2025-05-12 PROCEDURE — 99214 OFFICE O/P EST MOD 30 MIN: CPT | Performed by: STUDENT IN AN ORGANIZED HEALTH CARE EDUCATION/TRAINING PROGRAM

## 2025-05-12 PROCEDURE — 2500000004 HC RX 250 GENERAL PHARMACY W/ HCPCS (ALT 636 FOR OP/ED): Mod: JZ | Performed by: STUDENT IN AN ORGANIZED HEALTH CARE EDUCATION/TRAINING PROGRAM

## 2025-05-12 RX ORDER — DIPHENHYDRAMINE HYDROCHLORIDE 50 MG/ML
50 INJECTION, SOLUTION INTRAMUSCULAR; INTRAVENOUS AS NEEDED
OUTPATIENT
Start: 2025-05-12

## 2025-05-12 RX ORDER — EPINEPHRINE 0.3 MG/.3ML
0.3 INJECTION SUBCUTANEOUS EVERY 5 MIN PRN
OUTPATIENT
Start: 2025-05-12

## 2025-05-12 RX ORDER — FAMOTIDINE 10 MG/ML
20 INJECTION, SOLUTION INTRAVENOUS ONCE AS NEEDED
OUTPATIENT
Start: 2025-05-12

## 2025-05-12 RX ORDER — ALBUTEROL SULFATE 0.83 MG/ML
3 SOLUTION RESPIRATORY (INHALATION) AS NEEDED
OUTPATIENT
Start: 2025-05-12

## 2025-05-12 RX ADMIN — ONABOTULINUMTOXINA 195 UNITS: 100 INJECTION, POWDER, LYOPHILIZED, FOR SOLUTION INTRADERMAL; INTRAMUSCULAR at 13:38

## 2025-05-12 NOTE — PROGRESS NOTES
Subjective   Patient having 30/30 HA days per month. Tox helping with severity. Denies any side effects to Botox.    Botox    Date/Time: 5/12/2025 1:32 PM    Performed by: Jeremi Blankenship DO  Authorized by: Jeremi Blankenship DO    Procedure specific details:      Procedure Note: PREEMPT Botox    Indications: Chronic Migraine    Informed consent was obtained (explaining the procedure and risks and benefits of procedure) from patient: the signed consent form was placed in the medical record.  A time out was completed, verifying correct patient, procedure,site, positioning, and implants or special equipment.    200 units of OnabotulinumtoxinA were reconstituted with saline. Sites of injection were identified via PREEMPT protocol and cleaned with alcohol pads. Using a 30 gauge needle, patient received following injections:    5 units in procerus  5 units in each left and right   10 units divided between 2 sites of L frontalis  10 units divided between 2 sites of R frontalis  20 units divided between 4 sites of L temporalis  20 units divided between 4 sites of R temporalis  15 units divided between 3 sites of L occipitalis  15 units divided between 3 sites of R occipitalis  10 units divided between 2 sites of L paracervical muscles  10 units divided between 2 sites of R paracervical muscles  15 units divided between 3 sites of L trapezius  15 units divided between 3 sites of R trapezius    Additional Sites:  10u L temporalis  10u R temporalis  10u L occipitalis  10u R occipitalis    Used: 195u  Wasted: 5u    There were no complications. Patient was comfortable and left without complaint.     OnabotulinumtoxinA  Lot #: [id number]  Exp: 5/2027        Assessment   Patient with chronic migraine w/o aura. Having high frequency headaches. Botox helping with severity, but continues to be high frequency. Per our discussion, will aim to start Vyepti for additional migraine ppx. Will discontinue atogepant once Vyepti approved.      - start Vyepti 100mg quarterly  - continue Botox PREEMPT  - continue atogepant 60mg daily until vyepti approved

## 2025-05-14 ENCOUNTER — APPOINTMENT (OUTPATIENT)
Dept: PRIMARY CARE | Facility: CLINIC | Age: 35
End: 2025-05-14
Payer: COMMERCIAL

## 2025-05-14 VITALS
BODY MASS INDEX: 31.28 KG/M2 | HEART RATE: 70 BPM | WEIGHT: 170 LBS | HEIGHT: 62 IN | DIASTOLIC BLOOD PRESSURE: 60 MMHG | SYSTOLIC BLOOD PRESSURE: 114 MMHG

## 2025-05-14 DIAGNOSIS — E66.811 CLASS 1 OBESITY WITH SERIOUS COMORBIDITY AND BODY MASS INDEX (BMI) OF 34.0 TO 34.9 IN ADULT, UNSPECIFIED OBESITY TYPE: ICD-10-CM

## 2025-05-14 DIAGNOSIS — R73.9 HYPERGLYCEMIA: ICD-10-CM

## 2025-05-14 DIAGNOSIS — K59.09 OTHER CONSTIPATION: ICD-10-CM

## 2025-05-14 PROCEDURE — 99214 OFFICE O/P EST MOD 30 MIN: CPT | Performed by: FAMILY MEDICINE

## 2025-05-14 RX ORDER — TIRZEPATIDE 5 MG/.5ML
5 INJECTION, SOLUTION SUBCUTANEOUS
Qty: 4 ML | Refills: 0 | COMMUNITY
Start: 2025-05-14

## 2025-05-14 ASSESSMENT — ENCOUNTER SYMPTOMS
POLYDIPSIA: 0
SPEECH DIFFICULTY: 0
SLEEP DISTURBANCE: 0
FEVER: 0
BLOOD IN STOOL: 0
TROUBLE SWALLOWING: 0
ARTHRALGIAS: 0
SEIZURES: 0
OCCASIONAL FEELINGS OF UNSTEADINESS: 0
ABDOMINAL PAIN: 1
RECTAL PAIN: 0
HEMATURIA: 0
DYSPHORIC MOOD: 0
FATIGUE: 0
APPETITE CHANGE: 0
MYALGIAS: 0
ADENOPATHY: 0
CONSTIPATION: 1
AGITATION: 0
SHORTNESS OF BREATH: 0
HEADACHES: 0
LOSS OF SENSATION IN FEET: 0
CONSTITUTIONAL NEGATIVE: 1
SORE THROAT: 0
COLOR CHANGE: 0
SINUS PAIN: 0
DIARRHEA: 0
POLYPHAGIA: 0
RHINORRHEA: 0
STRIDOR: 0
ABDOMINAL DISTENTION: 0
EYE PAIN: 0
DECREASED CONCENTRATION: 0
CONFUSION: 0
DIZZINESS: 0
ACTIVITY CHANGE: 0
DYSURIA: 0
NECK STIFFNESS: 0
COUGH: 0
SINUS PRESSURE: 0
PHOTOPHOBIA: 0
PALPITATIONS: 0
NERVOUS/ANXIOUS: 0
DEPRESSION: 0
CHEST TIGHTNESS: 0
FLANK PAIN: 0

## 2025-05-14 ASSESSMENT — PATIENT HEALTH QUESTIONNAIRE - PHQ9
2. FEELING DOWN, DEPRESSED OR HOPELESS: NOT AT ALL
1. LITTLE INTEREST OR PLEASURE IN DOING THINGS: NOT AT ALL
SUM OF ALL RESPONSES TO PHQ9 QUESTIONS 1 AND 2: 0

## 2025-05-14 NOTE — PROGRESS NOTES
"Subjective   Patient ID: Violette Dee is a 35 y.o. female who presents for Follow-up (Follow up meds).    Patient present today to follow up on weight loss. Currently taking Mounjaro. Down 20lbs since March.        Review of Systems   Constitutional: Negative.  Negative for activity change, appetite change, fatigue and fever.   HENT:  Negative for congestion, dental problem, ear discharge, ear pain, mouth sores, rhinorrhea, sinus pressure, sinus pain, sore throat, tinnitus and trouble swallowing.    Eyes:  Negative for photophobia, pain and visual disturbance.   Respiratory:  Negative for cough, chest tightness, shortness of breath and stridor.    Cardiovascular:  Negative for chest pain and palpitations.   Gastrointestinal:  Positive for abdominal pain and constipation. Negative for abdominal distention, blood in stool, diarrhea and rectal pain.   Endocrine: Negative for cold intolerance, heat intolerance, polydipsia, polyphagia and polyuria.   Genitourinary:  Negative for dysuria, flank pain, hematuria and urgency.   Musculoskeletal:  Negative for arthralgias, gait problem, myalgias and neck stiffness.   Skin:  Negative for color change and rash.   Allergic/Immunologic: Negative for environmental allergies and food allergies.   Neurological:  Negative for dizziness, seizures, syncope, speech difficulty and headaches.   Hematological:  Negative for adenopathy.   Psychiatric/Behavioral:  Negative for agitation, confusion, decreased concentration, dysphoric mood and sleep disturbance. The patient is not nervous/anxious.        Objective   /60   Pulse 70   Ht 1.575 m (5' 2\")   Wt 77.1 kg (170 lb)   Breastfeeding No   BMI 31.09 kg/m²     Physical Exam  Vitals reviewed.   Constitutional:       General: She is not in acute distress.     Appearance: Normal appearance. She is normal weight. She is not ill-appearing or diaphoretic.   HENT:      Head: Normocephalic.      Right Ear: Tympanic membrane and " external ear normal.      Left Ear: Tympanic membrane and external ear normal.      Nose: Nose normal. No congestion.      Mouth/Throat:      Pharynx: No posterior oropharyngeal erythema.   Eyes:      General:         Right eye: No discharge.         Left eye: No discharge.      Extraocular Movements: Extraocular movements intact.      Conjunctiva/sclera: Conjunctivae normal.      Pupils: Pupils are equal, round, and reactive to light.   Cardiovascular:      Rate and Rhythm: Normal rate and regular rhythm.      Pulses: Normal pulses.      Heart sounds: Normal heart sounds. No murmur heard.  Pulmonary:      Effort: Pulmonary effort is normal. No respiratory distress.      Breath sounds: Normal breath sounds. No wheezing or rales.   Chest:      Chest wall: No tenderness.   Abdominal:      General: Abdomen is flat. Bowel sounds are normal. There is no distension.      Palpations: There is no mass.      Tenderness: There is no abdominal tenderness. There is no guarding.   Musculoskeletal:         General: No tenderness. Normal range of motion.      Cervical back: Normal range of motion and neck supple. No tenderness.      Right lower leg: No edema.      Left lower leg: No edema.   Skin:     General: Skin is dry.      Coloration: Skin is not jaundiced.      Findings: No bruising, erythema or rash.   Neurological:      General: No focal deficit present.      Mental Status: She is alert and oriented to person, place, and time. Mental status is at baseline.      Cranial Nerves: No cranial nerve deficit.      Sensory: No sensory deficit.      Coordination: Coordination normal.      Gait: Gait normal.   Psychiatric:         Mood and Affect: Mood normal.         Thought Content: Thought content normal.         Judgment: Judgment normal.         Assessment/Plan   Problem List Items Addressed This Visit           ICD-10-CM    Hyperglycemia R73.9    Obesity E66.9    BMI 31.0-31.9,adult - Primary Z68.31    Relevant Medications     Mounjaro 5 mg/0.5 mL pen injector     Other Visit Diagnoses         Codes      Other constipation     K59.09             Scribe Attestation  By signing my name below, I, RAY Erwin , [place]   attest that this documentation has been prepared under the direction and in the presence of Gaston Montenegro DO.   Provider Attestation - Scribe documentation    All medical record entries made by the Scribe were at my direction and personally dictated by me. I have reviewed the chart and agree that the record accurately reflects my personal performance of the history, physical exam, discussion and plan.

## 2025-05-28 ENCOUNTER — SPECIALTY PHARMACY (OUTPATIENT)
Dept: PHARMACY | Facility: CLINIC | Age: 35
End: 2025-05-28

## 2025-05-28 PROCEDURE — RXMED WILLOW AMBULATORY MEDICATION CHARGE

## 2025-05-29 ENCOUNTER — PHARMACY VISIT (OUTPATIENT)
Dept: PHARMACY | Facility: CLINIC | Age: 35
End: 2025-05-29
Payer: MEDICAID

## 2025-06-13 ENCOUNTER — TELEPHONE (OUTPATIENT)
Dept: PRIMARY CARE | Facility: CLINIC | Age: 35
End: 2025-06-13
Payer: COMMERCIAL

## 2025-06-13 DIAGNOSIS — H72.91 PERFORATION OF RIGHT TYMPANIC MEMBRANE: Primary | ICD-10-CM

## 2025-06-13 RX ORDER — OFLOXACIN 3 MG/ML
5 SOLUTION AURICULAR (OTIC) 2 TIMES DAILY
Qty: 10 ML | Refills: 0 | Status: SHIPPED | OUTPATIENT
Start: 2025-06-13 | End: 2025-12-30

## 2025-06-13 NOTE — TELEPHONE ENCOUNTER
Patient was seen in office complaining of pain in the right ear.  She notes he poked herself in the right ear using a Q-tip recently.  She notes some blood today coming from the ear canal.  She notes the ear feels muffled and mildly painful.  Denies fever chills    Examination of the ears shows small blood on the right tympanic membrane consistent with small perforation.  There is no obvious hole in the tympanic membrane noted.  Canal is clear    Patient with a perforated tympanic membrane on exam.  Discussed in detail with patient.  Ofloxacin is ordered.  If pain intensifies she will need to see someone over the weekend such as urgent care.  Follow-up in office in the next week for recheck of the ear

## 2025-06-14 ENCOUNTER — DOCUMENTATION (OUTPATIENT)
Dept: INFUSION THERAPY | Facility: CLINIC | Age: 35
End: 2025-06-14
Payer: COMMERCIAL

## 2025-06-14 NOTE — PROGRESS NOTES
Patient to be scheduled for [facility] of [facility] infusions  For Diagnosis: Chronic Migraines    Any history of recent cardiovascular event? No  (Avoid use. Discuss with prescribing provider prior to proceeding)    Patient Active Problem List   Diagnosis    Anxiety    Arthritis of knee, left    Chest pain    Depression    Dysuria    Fall at home    Fibrocystic breast disease (FCBD)    Gastroenteritis    GERD (gastroesophageal reflux disease)    Hyperglycemia    Intermittent right upper quadrant abdominal pain    Intractable migraine with aura with status migrainosus    Migraine with aura and without status migrainosus, not intractable    Lipoma of torso    Lymphadenopathy    Chronic migraine    Nausea in adult    Numbness in feet    Obesity    Oligomenorrhea    Pelvic pressure in female    Scoliosis    Soft tissue mass    Tonsillitis with exudate    Trauma of pelvis    Vitamin B 12 deficiency    Vitamin D deficiency    Anger    Mood swings    Fear of flying    BMI 31.0-31.9,adult    Weight loss    Edema    Chronic migraine w/o aura w/o status migrainosus, not intractable      Past Medical History:   Diagnosis Date    Bariatric surgery status 12/12/2019    S/P gastric bypass    Bariatric surgery status 12/12/2019    H/O bariatric surgery    Other specified health status     Known health problems: none    Personal history of other diseases of the respiratory system 01/05/2023    History of upper respiratory infection        Last infusion received: N/A (if continuation)    Due: Anytime-PAR to call and schedule patient.     Okay to schedule for treatment as ordered by prescribing provider.

## 2025-06-27 DIAGNOSIS — R63.5 WEIGHT GAIN: ICD-10-CM

## 2025-06-27 DIAGNOSIS — R73.9 HYPERGLYCEMIA: ICD-10-CM

## 2025-06-27 DIAGNOSIS — R11.0 NAUSEA IN ADULT: ICD-10-CM

## 2025-06-27 RX ORDER — PHENTERMINE HYDROCHLORIDE 37.5 MG/1
37.5 TABLET ORAL
Qty: 30 TABLET | Refills: 0 | Status: SHIPPED | OUTPATIENT
Start: 2025-06-27 | End: 2025-07-27

## 2025-06-27 RX ORDER — ONDANSETRON 8 MG/1
8 TABLET, ORALLY DISINTEGRATING ORAL EVERY 8 HOURS PRN
Qty: 30 TABLET | Refills: 0 | Status: SHIPPED | OUTPATIENT
Start: 2025-06-27

## 2025-06-27 NOTE — TELEPHONE ENCOUNTER
JE patient     Patient contacted office to obtain refills of Phentermine and Ondansetron. Patient would like these medications sent into her local [facility] pharmacy.

## 2025-07-09 ENCOUNTER — OFFICE VISIT (OUTPATIENT)
Dept: PRIMARY CARE | Facility: CLINIC | Age: 35
End: 2025-07-09
Payer: COMMERCIAL

## 2025-07-09 VITALS
HEIGHT: 62 IN | HEART RATE: 111 BPM | TEMPERATURE: 98 F | SYSTOLIC BLOOD PRESSURE: 104 MMHG | WEIGHT: 168 LBS | DIASTOLIC BLOOD PRESSURE: 68 MMHG | BODY MASS INDEX: 30.91 KG/M2 | OXYGEN SATURATION: 99 %

## 2025-07-09 DIAGNOSIS — G43.809 OTHER MIGRAINE WITHOUT STATUS MIGRAINOSUS, NOT INTRACTABLE: Primary | ICD-10-CM

## 2025-07-09 DIAGNOSIS — G43.111 INTRACTABLE MIGRAINE WITH AURA WITH STATUS MIGRAINOSUS: ICD-10-CM

## 2025-07-09 PROBLEM — G43.909 MIGRAINE WITHOUT STATUS MIGRAINOSUS, NOT INTRACTABLE: Status: ACTIVE | Noted: 2025-07-09

## 2025-07-09 PROCEDURE — 99213 OFFICE O/P EST LOW 20 MIN: CPT | Performed by: FAMILY MEDICINE

## 2025-07-09 ASSESSMENT — ENCOUNTER SYMPTOMS
ACTIVITY CHANGE: 0
MYALGIAS: 0
NECK STIFFNESS: 0
PALPITATIONS: 0
DYSURIA: 0
SINUS PRESSURE: 0
ABDOMINAL PAIN: 0
ABDOMINAL DISTENTION: 0
ADENOPATHY: 0
FLANK PAIN: 0
SHORTNESS OF BREATH: 0
ARTHRALGIAS: 0
HEMATURIA: 0
FATIGUE: 0
SEIZURES: 0
HEADACHES: 1
DIZZINESS: 0
FEVER: 0
DECREASED CONCENTRATION: 0
RECTAL PAIN: 0
COLOR CHANGE: 0
TROUBLE SWALLOWING: 0
AGITATION: 0
BLOOD IN STOOL: 0
DIARRHEA: 0
CONSTIPATION: 0
DYSPHORIC MOOD: 0
CONSTITUTIONAL NEGATIVE: 1
RHINORRHEA: 0
SORE THROAT: 0
STRIDOR: 0
NERVOUS/ANXIOUS: 0
CONFUSION: 0
SPEECH DIFFICULTY: 0
POLYPHAGIA: 0
SLEEP DISTURBANCE: 0
PHOTOPHOBIA: 0
EYE PAIN: 0
POLYDIPSIA: 0
SINUS PAIN: 0
CHEST TIGHTNESS: 0
COUGH: 0
APPETITE CHANGE: 0

## 2025-07-09 NOTE — PROGRESS NOTES
Subjective   Patient ID: Violette Dee is a 35 y.o. female who presents for Follow-up.  She is being followed by Dr. Jeremi Blankenship for chronic migraines and is on multiple medications prophylactically and therapeutically yet he refuses to provide her with FMLA for condition that he is treating her for.  We did message him regarding this and the fact that he through his office has told her that he does not do FMLA.  We left him a message that we typically do not do FMLA for conditions that somebody else is treating.  We have gotten no response from him yet and will await this but will temporarily provide FMLA coverage for the condition he is treating.  HPI   Pt presents today to discuss FMLA for migraines. Also, having nausea     Review of Systems   Constitutional: Negative.  Negative for activity change, appetite change, fatigue and fever.   HENT:  Negative for congestion, dental problem, ear discharge, ear pain, mouth sores, rhinorrhea, sinus pressure, sinus pain, sore throat, tinnitus and trouble swallowing.    Eyes:  Negative for photophobia, pain and visual disturbance.   Respiratory:  Negative for cough, chest tightness, shortness of breath and stridor.    Cardiovascular:  Negative for chest pain and palpitations.   Gastrointestinal:  Negative for abdominal distention, abdominal pain, blood in stool, constipation, diarrhea and rectal pain.   Endocrine: Negative for cold intolerance, heat intolerance, polydipsia, polyphagia and polyuria.   Genitourinary:  Negative for dysuria, flank pain, hematuria and urgency.   Musculoskeletal:  Negative for arthralgias, gait problem, myalgias and neck stiffness.   Skin:  Negative for color change and rash.   Allergic/Immunologic: Negative for environmental allergies and food allergies.   Neurological:  Positive for headaches (migraine). Negative for dizziness, seizures, syncope and speech difficulty.   Hematological:  Negative for adenopathy.   Psychiatric/Behavioral:   Negative for agitation, confusion, decreased concentration, dysphoric mood and sleep disturbance. The patient is not nervous/anxious.        Objective   VSS    Physical Exam  Vitals reviewed.   Constitutional:       General: She is not in acute distress.     Appearance: Normal appearance. She is normal weight. She is not ill-appearing or diaphoretic.   HENT:      Head: Normocephalic.      Right Ear: Tympanic membrane and external ear normal.      Left Ear: Tympanic membrane and external ear normal.      Nose: Nose normal. No congestion.      Mouth/Throat:      Pharynx: No posterior oropharyngeal erythema.   Eyes:      General:         Right eye: No discharge.         Left eye: No discharge.      Extraocular Movements: Extraocular movements intact.      Conjunctiva/sclera: Conjunctivae normal.      Pupils: Pupils are equal, round, and reactive to light.   Cardiovascular:      Rate and Rhythm: Normal rate and regular rhythm.      Pulses: Normal pulses.      Heart sounds: Normal heart sounds. No murmur heard.  Pulmonary:      Effort: Pulmonary effort is normal. No respiratory distress.      Breath sounds: Normal breath sounds. No wheezing or rales.   Chest:      Chest wall: No tenderness.   Abdominal:      General: Abdomen is flat. Bowel sounds are normal. There is no distension.      Palpations: There is no mass.      Tenderness: There is no abdominal tenderness. There is no guarding.   Musculoskeletal:         General: No tenderness. Normal range of motion.      Cervical back: Normal range of motion and neck supple. No tenderness.      Right lower leg: No edema.      Left lower leg: No edema.   Skin:     General: Skin is dry.      Coloration: Skin is not jaundiced.      Findings: No bruising, erythema or rash.   Neurological:      General: No focal deficit present.      Mental Status: She is alert and oriented to person, place, and time. Mental status is at baseline.      Cranial Nerves: No cranial nerve deficit.       Sensory: No sensory deficit.      Coordination: Coordination normal.      Gait: Gait normal.   Psychiatric:         Mood and Affect: Mood normal.         Thought Content: Thought content normal.         Judgment: Judgment normal.         Assessment/Plan   Problem List Items Addressed This Visit           ICD-10-CM    Intractable migraine with aura with status migrainosus G43.111    Migraine without status migrainosus, not intractable - Primary G43.909     After the patient left and the FMLA was completed we did get a response from Dr. Jeremi Blankenship and he feels that work is one of the driving forces for exacerbation of her headaches.  We recommend she follow-up with Dr. Blankenship for any of these issues in the future.  Dr. Blankenship did indicate that he would reach out to her and see what additional therapeutic endeavors would be helpful.

## 2025-07-29 ENCOUNTER — SPECIALTY PHARMACY (OUTPATIENT)
Dept: PHARMACY | Facility: CLINIC | Age: 35
End: 2025-07-29

## 2025-07-29 ENCOUNTER — PATIENT MESSAGE (OUTPATIENT)
Dept: PRIMARY CARE | Facility: CLINIC | Age: 35
End: 2025-07-29
Payer: COMMERCIAL

## 2025-07-29 DIAGNOSIS — E55.9 VITAMIN D DEFICIENCY: ICD-10-CM

## 2025-07-29 DIAGNOSIS — R73.9 HYPERGLYCEMIA: Primary | ICD-10-CM

## 2025-07-29 PROCEDURE — RXMED WILLOW AMBULATORY MEDICATION CHARGE

## 2025-07-30 ENCOUNTER — APPOINTMENT (OUTPATIENT)
Dept: INFUSION THERAPY | Facility: CLINIC | Age: 35
End: 2025-07-30
Payer: COMMERCIAL

## 2025-08-01 ENCOUNTER — PHARMACY VISIT (OUTPATIENT)
Dept: PHARMACY | Facility: CLINIC | Age: 35
End: 2025-08-01
Payer: MEDICAID

## 2025-08-01 ENCOUNTER — TELEPHONE (OUTPATIENT)
Dept: PRIMARY CARE | Facility: CLINIC | Age: 35
End: 2025-08-01
Payer: COMMERCIAL

## 2025-08-01 NOTE — TELEPHONE ENCOUNTER
"PATIENT REQUEST FOR LAB ORDERS -VIA EPIC SECURE CHAT:  \"can you check my chart see if my lab orders been place\"    NO NEW ORDERS HAVE BEEN PLACED IN PATIENT CHART - OPEN ACTIVE ORDERS CONSIST OF:  1  Urinalysis with Reflex Microscopic (Order #[id number]) on 10/14/24   DR. VÁSQUEZ    PATIENT ADVISED THROUGH SAME EPIC SECURE CHAT MODULE.sk    "

## 2025-08-06 ENCOUNTER — OFFICE VISIT (OUTPATIENT)
Dept: PRIMARY CARE | Facility: CLINIC | Age: 35
End: 2025-08-06
Payer: COMMERCIAL

## 2025-08-06 ENCOUNTER — APPOINTMENT (OUTPATIENT)
Dept: PRIMARY CARE | Facility: CLINIC | Age: 35
End: 2025-08-06
Payer: COMMERCIAL

## 2025-08-06 VITALS
SYSTOLIC BLOOD PRESSURE: 118 MMHG | OXYGEN SATURATION: 98 % | TEMPERATURE: 98 F | HEIGHT: 62 IN | DIASTOLIC BLOOD PRESSURE: 70 MMHG | BODY MASS INDEX: 31.1 KG/M2 | RESPIRATION RATE: 16 BRPM | HEART RATE: 100 BPM | WEIGHT: 169 LBS

## 2025-08-06 DIAGNOSIS — R63.4 WEIGHT LOSS: ICD-10-CM

## 2025-08-06 DIAGNOSIS — R73.9 HYPERGLYCEMIA: Primary | ICD-10-CM

## 2025-08-06 DIAGNOSIS — E66.811 CLASS 1 OBESITY WITH SERIOUS COMORBIDITY AND BODY MASS INDEX (BMI) OF 34.0 TO 34.9 IN ADULT, UNSPECIFIED OBESITY TYPE: ICD-10-CM

## 2025-08-06 PROCEDURE — 99214 OFFICE O/P EST MOD 30 MIN: CPT | Performed by: FAMILY MEDICINE

## 2025-08-06 RX ORDER — PHENTERMINE HYDROCHLORIDE 37.5 MG/1
37.5 TABLET ORAL
Qty: 30 TABLET | Refills: 0 | Status: SHIPPED | OUTPATIENT
Start: 2025-08-06 | End: 2025-09-05

## 2025-08-06 ASSESSMENT — ENCOUNTER SYMPTOMS
COUGH: 0
PHOTOPHOBIA: 0
BLOOD IN STOOL: 0
PALPITATIONS: 0
FLANK PAIN: 0
ACTIVITY CHANGE: 0
TROUBLE SWALLOWING: 0
APPETITE CHANGE: 0
ADENOPATHY: 0
MYALGIAS: 0
NERVOUS/ANXIOUS: 0
EYE PAIN: 0
SINUS PAIN: 0
POLYDIPSIA: 0
DYSPHORIC MOOD: 0
SPEECH DIFFICULTY: 0
COLOR CHANGE: 0
CHEST TIGHTNESS: 0
SORE THROAT: 0
SLEEP DISTURBANCE: 0
DIZZINESS: 0
SHORTNESS OF BREATH: 0
DECREASED CONCENTRATION: 0
CONSTIPATION: 0
ABDOMINAL DISTENTION: 0
POLYPHAGIA: 0
ARTHRALGIAS: 0
DYSURIA: 0
SINUS PRESSURE: 0
FEVER: 0
AGITATION: 0
RHINORRHEA: 0
SEIZURES: 0
CONSTITUTIONAL NEGATIVE: 1
CONFUSION: 0
RECTAL PAIN: 0
NECK STIFFNESS: 0
STRIDOR: 0
FATIGUE: 0
ABDOMINAL PAIN: 0
HEADACHES: 0
DIARRHEA: 0
HEMATURIA: 0

## 2025-08-06 NOTE — PROGRESS NOTES
Subjective   Patient ID: Violette Dee is a 35 y.o. female who presents for Physical Exam. I last saw the patient on 7/9/2025.     HPI  Patient had labs done this morning, labs still pending.   Patient states that she has been Fatigues, Jittery, and states that she has been Bruising a lot. Ongoing for a while. She states that Diabetes runs in the family and is concerned about that.     Patient taking Phentermine, denies any side effects.     Past medical, surgical, and family history reviewed.  Reviewed and documented all medications   Pt eating well, exercising as tolerated and taking medications as directed.      Review of Systems   Constitutional: Negative.  Negative for activity change, appetite change, fatigue and fever.   HENT:  Negative for congestion, dental problem, ear discharge, ear pain, mouth sores, rhinorrhea, sinus pressure, sinus pain, sore throat, tinnitus and trouble swallowing.    Eyes:  Negative for photophobia, pain and visual disturbance.   Respiratory:  Negative for cough, chest tightness, shortness of breath and stridor.    Cardiovascular:  Negative for chest pain and palpitations.   Gastrointestinal:  Negative for abdominal distention, abdominal pain, blood in stool, constipation, diarrhea and rectal pain.   Endocrine: Negative for cold intolerance, heat intolerance, polydipsia, polyphagia and polyuria.   Genitourinary:  Negative for dysuria, flank pain, hematuria and urgency.   Musculoskeletal:  Negative for arthralgias, gait problem, myalgias and neck stiffness.   Skin:  Negative for color change and rash.   Allergic/Immunologic: Negative for environmental allergies and food allergies.   Neurological:  Negative for dizziness, seizures, syncope, speech difficulty and headaches.   Hematological:  Negative for adenopathy.   Psychiatric/Behavioral:  Negative for agitation, confusion, decreased concentration, dysphoric mood and sleep disturbance. The patient is not nervous/anxious.   "      Objective   Visit Vitals  /70   Pulse 100   Temp 36.7 °C (98 °F)   Resp 16   Ht 1.575 m (5' 2\")   Wt 76.7 kg (169 lb)   SpO2 98%   BMI 30.91 kg/m²   OB Status Implant   Smoking Status Former   BSA 1.83 m²       Physical Exam  Vitals reviewed.   Constitutional:       General: She is not in acute distress.     Appearance: Normal appearance. She is normal weight. She is not ill-appearing or diaphoretic.   HENT:      Head: Normocephalic.      Right Ear: Tympanic membrane and external ear normal.      Left Ear: Tympanic membrane and external ear normal.      Nose: Nose normal. No congestion.      Mouth/Throat:      Pharynx: No posterior oropharyngeal erythema.     Eyes:      General:         Right eye: No discharge.         Left eye: No discharge.      Extraocular Movements: Extraocular movements intact.      Conjunctiva/sclera: Conjunctivae normal.      Pupils: Pupils are equal, round, and reactive to light.       Cardiovascular:      Rate and Rhythm: Normal rate and regular rhythm.      Pulses: Normal pulses.      Heart sounds: Normal heart sounds. No murmur heard.  Pulmonary:      Effort: Pulmonary effort is normal. No respiratory distress.      Breath sounds: Normal breath sounds. No wheezing or rales.   Chest:      Chest wall: No tenderness.   Abdominal:      General: Abdomen is flat. Bowel sounds are normal. There is no distension.      Palpations: There is no mass.      Tenderness: There is no abdominal tenderness. There is no guarding.     Musculoskeletal:         General: No tenderness. Normal range of motion.      Cervical back: Normal range of motion and neck supple. No tenderness.      Right lower leg: No edema.      Left lower leg: No edema.     Skin:     General: Skin is dry.      Coloration: Skin is not jaundiced.      Findings: No bruising, erythema or rash.     Neurological:      General: No focal deficit present.      Mental Status: She is alert and oriented to person, place, and time. " Mental status is at baseline.      Cranial Nerves: No cranial nerve deficit.      Sensory: No sensory deficit.      Coordination: Coordination normal.      Gait: Gait normal.     Psychiatric:         Mood and Affect: Mood normal.         Thought Content: Thought content normal.         Judgment: Judgment normal.             Assessment   1. Hyperglycemia        2. Weight loss  phentermine (Adipex-P) 37.5 mg tablet      3. Class 1 obesity with serious comorbidity and body mass index (BMI) of 34.0 to 34.9 in adult, unspecified obesity type            Patient to continue current medications (with any exceptions as noted) and diet. Follow-up in 3 month(s) otherwise as needed.      Oarrs/csa completed today.    Scribe Attestation  By signing my name below, I, RAY Mondragon Scribe   attest that this documentation has been prepared under the direction and in the presence of Gaston Montenegro DO.     All medical record entries made by the Scribe were at my direction and personally dictated by me. I have reviewed the chart and agree that the record accurately reflects my personal performance of the history, physical exam, discussion and plan.

## 2025-08-07 DIAGNOSIS — R11.0 NAUSEA IN ADULT: ICD-10-CM

## 2025-08-07 LAB
25(OH)D3+25(OH)D2 SERPL-MCNC: 41 NG/ML (ref 30–100)
ALBUMIN SERPL-MCNC: 4.5 G/DL (ref 3.6–5.1)
ALP SERPL-CCNC: 91 U/L (ref 31–125)
ALT SERPL-CCNC: 24 U/L (ref 6–29)
ANION GAP SERPL CALCULATED.4IONS-SCNC: 10 MMOL/L (CALC) (ref 7–17)
AST SERPL-CCNC: 32 U/L (ref 10–30)
BASOPHILS # BLD AUTO: 51 CELLS/UL (ref 0–200)
BASOPHILS NFR BLD AUTO: 1.1 %
BILIRUB SERPL-MCNC: 0.4 MG/DL (ref 0.2–1.2)
BUN SERPL-MCNC: 7 MG/DL (ref 7–25)
CALCIUM SERPL-MCNC: 9.3 MG/DL (ref 8.6–10.2)
CHLORIDE SERPL-SCNC: 103 MMOL/L (ref 98–110)
CHOLEST SERPL-MCNC: 173 MG/DL
CHOLEST/HDLC SERPL: 2 (CALC)
CO2 SERPL-SCNC: 26 MMOL/L (ref 20–32)
CREAT SERPL-MCNC: 0.78 MG/DL (ref 0.5–0.97)
EGFRCR SERPLBLD CKD-EPI 2021: 101 ML/MIN/1.73M2
EOSINOPHIL # BLD AUTO: 101 CELLS/UL (ref 15–500)
EOSINOPHIL NFR BLD AUTO: 2.2 %
ERYTHROCYTE [DISTWIDTH] IN BLOOD BY AUTOMATED COUNT: 12.7 % (ref 11–15)
EST. AVERAGE GLUCOSE BLD GHB EST-MCNC: 91 MG/DL
EST. AVERAGE GLUCOSE BLD GHB EST-SCNC: 5 MMOL/L
GLUCOSE SERPL-MCNC: 88 MG/DL (ref 65–99)
HBA1C MFR BLD: 4.8 %
HCT VFR BLD AUTO: 42.4 % (ref 35–45)
HDLC SERPL-MCNC: 88 MG/DL
HGB BLD-MCNC: 14.1 G/DL (ref 11.7–15.5)
LDLC SERPL CALC-MCNC: 69 MG/DL (CALC)
LYMPHOCYTES # BLD AUTO: 1325 CELLS/UL (ref 850–3900)
LYMPHOCYTES NFR BLD AUTO: 28.8 %
MCH RBC QN AUTO: 30.1 PG (ref 27–33)
MCHC RBC AUTO-ENTMCNC: 33.3 G/DL (ref 32–36)
MCV RBC AUTO: 90.4 FL (ref 80–100)
MONOCYTES # BLD AUTO: 313 CELLS/UL (ref 200–950)
MONOCYTES NFR BLD AUTO: 6.8 %
NEUTROPHILS # BLD AUTO: 2811 CELLS/UL (ref 1500–7800)
NEUTROPHILS NFR BLD AUTO: 61.1 %
NONHDLC SERPL-MCNC: 85 MG/DL (CALC)
PLATELET # BLD AUTO: 294 THOUSAND/UL (ref 140–400)
PMV BLD REES-ECKER: 11.2 FL (ref 7.5–12.5)
POTASSIUM SERPL-SCNC: 3.9 MMOL/L (ref 3.5–5.3)
PROT SERPL-MCNC: 7.4 G/DL (ref 6.1–8.1)
RBC # BLD AUTO: 4.69 MILLION/UL (ref 3.8–5.1)
SODIUM SERPL-SCNC: 139 MMOL/L (ref 135–146)
T4 FREE SERPL-MCNC: 1.2 NG/DL (ref 0.8–1.8)
T4 SERPL-MCNC: 7.5 MCG/DL (ref 5.1–11.9)
TRIGL SERPL-MCNC: 77 MG/DL
TSH SERPL-ACNC: 1.85 MIU/L
WBC # BLD AUTO: 4.6 THOUSAND/UL (ref 3.8–10.8)

## 2025-08-07 RX ORDER — ONDANSETRON 8 MG/1
8 TABLET, ORALLY DISINTEGRATING ORAL EVERY 8 HOURS PRN
Qty: 30 TABLET | Refills: 0 | Status: SHIPPED | OUTPATIENT
Start: 2025-08-07

## 2025-08-18 ENCOUNTER — PROCEDURE VISIT (OUTPATIENT)
Dept: NEUROLOGY | Facility: CLINIC | Age: 35
End: 2025-08-18
Payer: COMMERCIAL

## 2025-08-18 DIAGNOSIS — G43.709 CHRONIC MIGRAINE W/O AURA W/O STATUS MIGRAINOSUS, NOT INTRACTABLE: Primary | ICD-10-CM

## 2025-08-18 PROCEDURE — 64615 CHEMODENERV MUSC MIGRAINE: CPT | Performed by: STUDENT IN AN ORGANIZED HEALTH CARE EDUCATION/TRAINING PROGRAM

## 2025-08-18 PROCEDURE — 2500000004 HC RX 250 GENERAL PHARMACY W/ HCPCS (ALT 636 FOR OP/ED): Mod: JZ | Performed by: STUDENT IN AN ORGANIZED HEALTH CARE EDUCATION/TRAINING PROGRAM

## 2025-08-18 RX ADMIN — ONABOTULINUMTOXINA 195 UNITS: 100 INJECTION, POWDER, LYOPHILIZED, FOR SOLUTION INTRADERMAL; INTRAMUSCULAR at 14:47

## 2025-08-27 ENCOUNTER — APPOINTMENT (OUTPATIENT)
Dept: PRIMARY CARE | Facility: CLINIC | Age: 35
End: 2025-08-27
Payer: COMMERCIAL

## 2025-09-02 ENCOUNTER — PATIENT MESSAGE (OUTPATIENT)
Dept: PRIMARY CARE | Facility: CLINIC | Age: 35
End: 2025-09-02
Payer: COMMERCIAL

## 2025-09-02 DIAGNOSIS — R11.0 NAUSEA IN ADULT: ICD-10-CM

## 2025-09-02 RX ORDER — ONDANSETRON 8 MG/1
8 TABLET, ORALLY DISINTEGRATING ORAL EVERY 8 HOURS PRN
Qty: 30 TABLET | Refills: 0 | Status: SHIPPED | OUTPATIENT
Start: 2025-09-02

## 2025-11-12 ENCOUNTER — APPOINTMENT (OUTPATIENT)
Dept: PRIMARY CARE | Facility: CLINIC | Age: 35
End: 2025-11-12
Payer: COMMERCIAL

## (undated) DEVICE — TUBING, SUCTION, 9/32" X 12', STRAIGHT: Brand: MEDLINE INDUSTRIES, INC.

## (undated) DEVICE — SINGLE PORT MANIFOLD: Brand: NEPTUNE 2

## (undated) DEVICE — SYRINGE, LUER LOCK, 10ML: Brand: MEDLINE

## (undated) DEVICE — ELECTRODE PT RET AD L9FT HI MOIST COND ADH HYDRGEL CORDED

## (undated) DEVICE — SUTURE VCRL SZ 2-0 L36IN ABSRB UD L36MM CT-1 1/2 CIR J945H

## (undated) DEVICE — 3M™ STERI-DRAPE™ U-DRAPE 1015: Brand: STERI-DRAPE™

## (undated) DEVICE — KIT ARMOR C DRP COLLAPSIBLE AND SELF EXP TOP CVR FOR FLUOROSCOPIC

## (undated) DEVICE — BANDAGE,ELASTIC,ESMARK,STERILE,6"X9',LF: Brand: MEDLINE

## (undated) DEVICE — COUNTER NDL 40 COUNT HLD 70 FOAM BLK ADH W/ MAG

## (undated) DEVICE — LABEL MED MINI W/ MARKER

## (undated) DEVICE — GOWN,AURORA,NONREINFORCED,LARGE: Brand: MEDLINE

## (undated) DEVICE — GLOVE ORANGE PI 7 1/2   MSG9075

## (undated) DEVICE — PADDING CAST W4INXL4YD HIGHLY ABSRB THAN COT EZ APPL

## (undated) DEVICE — GOWN,AURORA,NONRNF,XL,30/CS: Brand: MEDLINE

## (undated) DEVICE — SPONGE GZ W4XL4IN COT 12 PLY TYP VII WVN C FLD DSGN STERILE

## (undated) DEVICE — SYRINGE IRRIG 60ML SFT PLIABLE BLB EZ TO GRP 1 HND USE W/

## (undated) DEVICE — NEPTUNE E-SEP SMOKE EVACUATION PENCIL, COATED, 70MM BLADE, PUSH BUTTON SWITCH: Brand: NEPTUNE E-SEP

## (undated) DEVICE — MARKER SURG SKIN GENTIAN VLT REG TIP W/ 6IN RUL DYNJSM01

## (undated) DEVICE — BANDAGE COBAN 4 IN COMPR W4INXL5YD FOAM COHESIVE QUIK STK SELF ADH SFT

## (undated) DEVICE — PACK,ARTHROSCOPY II,SIRUS: Brand: MEDLINE

## (undated) DEVICE — GLOVE ORANGE PI 7   MSG9070

## (undated) DEVICE — ZIMMER® STERILE DISPOSABLE TOURNIQUET CUFF, DUAL PORT, SINGLE BLADDER, 34 IN. (86 CM)

## (undated) DEVICE — BLADE,CARBON-STEEL,15,STRL,DISPOSABLE,TB: Brand: MEDLINE

## (undated) DEVICE — Device

## (undated) DEVICE — GOWN,SIRUS,POLYRNF,BRTHSLV,XLN/XL,20/CS: Brand: MEDLINE

## (undated) DEVICE — BANDAGE COMPR M W4INXL10YD WHT BGE VELC E MTRX HK AND LOOP

## (undated) DEVICE — SPONGE,LAP,18"X18",DLX,XR,ST,5/PK,40/PK: Brand: MEDLINE

## (undated) DEVICE — SUTURE ETHLN SZ 3-0 L18IN NONABSORBABLE BLK PS-2 L19MM 3/8 1669H

## (undated) DEVICE — DRESSING PETRO W3XL8IN OIL EMUL N ADH GZ KNIT IMPREG CELOS

## (undated) DEVICE — COVER LT HNDL BLU PLAS

## (undated) DEVICE — PAD,ABDOMINAL,8"X10",ST,LF: Brand: MEDLINE

## (undated) DEVICE — GLOVE ORTHO 7 1/2   MSG9475

## (undated) DEVICE — PADDING CAST W4INXL4YD SPUN DACRON POLY POR NON STERILE

## (undated) DEVICE — APPLICATOR MEDICATED 26 CC SOLUTION HI LT ORNG CHLORAPREP